# Patient Record
Sex: FEMALE | Race: BLACK OR AFRICAN AMERICAN | NOT HISPANIC OR LATINO | Employment: OTHER | URBAN - NONMETROPOLITAN AREA
[De-identification: names, ages, dates, MRNs, and addresses within clinical notes are randomized per-mention and may not be internally consistent; named-entity substitution may affect disease eponyms.]

---

## 2018-10-31 ENCOUNTER — OFFICE VISIT (OUTPATIENT)
Dept: FAMILY MEDICINE CLINIC | Facility: CLINIC | Age: 48
End: 2018-10-31

## 2018-10-31 VITALS
WEIGHT: 180.4 LBS | BODY MASS INDEX: 28.99 KG/M2 | HEIGHT: 66 IN | SYSTOLIC BLOOD PRESSURE: 148 MMHG | DIASTOLIC BLOOD PRESSURE: 96 MMHG

## 2018-10-31 DIAGNOSIS — Z87.11 HISTORY OF PEPTIC ULCER: ICD-10-CM

## 2018-10-31 DIAGNOSIS — Z12.11 SCREEN FOR COLON CANCER: ICD-10-CM

## 2018-10-31 DIAGNOSIS — Z12.31 VISIT FOR SCREENING MAMMOGRAM: ICD-10-CM

## 2018-10-31 DIAGNOSIS — Z11.4 SCREENING FOR HIV (HUMAN IMMUNODEFICIENCY VIRUS): ICD-10-CM

## 2018-10-31 DIAGNOSIS — Z78.9 ALCOHOL USE: ICD-10-CM

## 2018-10-31 DIAGNOSIS — Z72.0 TOBACCO USE: ICD-10-CM

## 2018-10-31 DIAGNOSIS — I10 BENIGN HYPERTENSION: Primary | ICD-10-CM

## 2018-10-31 DIAGNOSIS — Z11.59 ENCOUNTER FOR HEPATITIS C SCREENING TEST FOR LOW RISK PATIENT: ICD-10-CM

## 2018-10-31 DIAGNOSIS — F34.1 DYSTHYMIA: ICD-10-CM

## 2018-10-31 PROCEDURE — 99204 OFFICE O/P NEW MOD 45 MIN: CPT | Performed by: FAMILY MEDICINE

## 2018-10-31 RX ORDER — AMLODIPINE BESYLATE 5 MG/1
5 TABLET ORAL DAILY
Qty: 30 TABLET | Refills: 1 | Status: SHIPPED | OUTPATIENT
Start: 2018-10-31 | End: 2018-12-17 | Stop reason: SDUPTHER

## 2018-10-31 NOTE — PROGRESS NOTES
Subjective   Kesha Arevalo is a 48 y.o. female.     History of Present Illness   requesting evaluation hypertension.  Patient very poor historian.  States was taking blood pressure medicine) out about a year ago.  Recently moved to town.  States past history significant for sounds like psychiatric disease been on Zoloft and Abilify in the past.  Was on blood pressure medicine unknown type I time.  States only surgical history .  Does smoke states drinks moderately to a mild excess and time to time.  Playing history of bleeding peptic ulcers in the past.  Is behind on all screenings.    The following portions of the patient's history were reviewed and updated as appropriate: allergies, current medications, past family history, past medical history, past social history, past surgical history and problem list.    Review of Systems   Constitutional: Negative for activity change, appetite change, fatigue and unexpected weight change.   HENT: Negative for trouble swallowing and voice change.    Eyes: Negative for redness and visual disturbance.   Respiratory: Negative for cough and wheezing.    Cardiovascular: Negative for chest pain and palpitations.   Gastrointestinal: Positive for abdominal pain (Vague not reproducible). Negative for constipation, diarrhea, nausea and vomiting.   Genitourinary: Negative for urgency.   Musculoskeletal: Negative for joint swelling.   Neurological: Negative for syncope and headaches.   Hematological: Negative for adenopathy.   Psychiatric/Behavioral: Negative for sleep disturbance.       Objective   Physical Exam   Constitutional: She is oriented to person, place, and time. She appears well-developed.   HENT:   Head: Normocephalic.   Right Ear: External ear normal.   Left Ear: External ear normal.   Nose: Nose normal.   Mouth/Throat: Oropharynx is clear and moist.   Smokers erythema   Eyes: Pupils are equal, round, and reactive to light.   Neck: Normal range of motion. No  thyromegaly present.   Cardiovascular: Normal rate, regular rhythm, normal heart sounds and intact distal pulses.  Exam reveals no gallop and no friction rub.    No murmur heard.  Pulmonary/Chest: Breath sounds normal.   Abdominal: Soft. She exhibits no distension and no mass. There is no tenderness.   Musculoskeletal: Normal range of motion.   Neurological: She is alert and oriented to person, place, and time. She has normal reflexes.   Skin: Skin is warm and dry.   Psychiatric: Her affect is labile. Her speech is delayed and tangential. She is slowed. Cognition and memory are impaired.       Assessment/Plan   Kesha was seen today for establish care.    Diagnoses and all orders for this visit:    Benign hypertension  -     CBC (No Diff)  -     Comprehensive Metabolic Panel  -     Lipid Panel With LDL / HDL Ratio  -     Magnesium  -     Cancel: Urine Drug Screen - Urine, Clean Catch  -     MicroAlbumin, Urine, Random - Urine, Clean Catch  -     TSH  -     T4, Free  -     amLODIPine (NORVASC) 5 MG tablet; Take 1 tablet by mouth Daily. For bp till seen again    Tobacco use  -     Lipid Panel With LDL / HDL Ratio    Alcohol use  -     CBC (No Diff)  -     Lipid Panel With LDL / HDL Ratio  -     Cancel: Urine Drug Screen - Urine, Clean Catch  -     HIV-1 & HIV-2 Antibodies    Dysthymia  -     TSH  -     T4, Free    History of peptic ulcer    Encounter for hepatitis C screening test for low risk patient  -     Hepatitis C Antibody  -     Urine Drug Screen - Urine, Clean Catch    Visit for screening mammogram  -     Mammo Screening Digital Tomosynthesis Bilateral With CAD    Screen for colon cancer  -     Ambulatory Referral For Screening Colonoscopy    Screening for HIV (human immunodeficiency virus)  -     HIV-1 & HIV-2 Antibodies       on stopping smoking.  3-10m      on decreasing etoh for health reasons   lifestyle measures for hypertension control.  Start on low-dose amlodipine for now  lab work is ordered screens ordered declines vaccines recheck in 2 weeks on blood pressure also be time to a Pap smear.

## 2018-11-06 ENCOUNTER — PREP FOR SURGERY (OUTPATIENT)
Dept: OTHER | Facility: HOSPITAL | Age: 48
End: 2018-11-06

## 2018-11-06 DIAGNOSIS — Z12.11 SCREEN FOR COLON CANCER: Primary | ICD-10-CM

## 2018-11-06 RX ORDER — DEXTROSE AND SODIUM CHLORIDE 5; .45 G/100ML; G/100ML
100 INJECTION, SOLUTION INTRAVENOUS CONTINUOUS
Status: CANCELLED | OUTPATIENT
Start: 2018-11-12

## 2018-11-07 ENCOUNTER — HOSPITAL ENCOUNTER (OUTPATIENT)
Facility: HOSPITAL | Age: 48
Setting detail: HOSPITAL OUTPATIENT SURGERY
End: 2018-11-07
Attending: SURGERY | Admitting: SURGERY

## 2018-11-07 PROBLEM — Z12.11 SCREEN FOR COLON CANCER: Status: ACTIVE | Noted: 2018-11-07

## 2018-11-13 ENCOUNTER — TELEPHONE (OUTPATIENT)
Dept: FAMILY MEDICINE CLINIC | Facility: CLINIC | Age: 48
End: 2018-11-13

## 2018-12-17 DIAGNOSIS — I10 BENIGN HYPERTENSION: ICD-10-CM

## 2018-12-17 RX ORDER — AMLODIPINE BESYLATE 5 MG/1
TABLET ORAL
Qty: 90 TABLET | Refills: 0 | Status: SHIPPED | OUTPATIENT
Start: 2018-12-17 | End: 2019-01-15

## 2019-01-10 ENCOUNTER — TELEPHONE (OUTPATIENT)
Dept: FAMILY MEDICINE CLINIC | Facility: CLINIC | Age: 49
End: 2019-01-10

## 2019-01-10 NOTE — TELEPHONE ENCOUNTER
CASSIE PINO IS NEEDING A NEW PRESP TO BE SENT TO [facility] FOR THE AMLODIPINE  HER# [phone]

## 2019-01-11 ENCOUNTER — APPOINTMENT (OUTPATIENT)
Dept: LAB | Facility: HOSPITAL | Age: 49
End: 2019-01-11

## 2019-01-11 LAB
ALBUMIN SERPL-MCNC: 4.3 G/DL (ref 3.4–4.8)
ALBUMIN UR-MCNC: >114 MG/L
ALBUMIN/GLOB SERPL: 1.2 G/DL (ref 1.1–1.8)
ALP SERPL-CCNC: 60 U/L (ref 38–126)
ALT SERPL W P-5'-P-CCNC: 19 U/L (ref 9–52)
AMPHET+METHAMPHET UR QL: NEGATIVE
ANION GAP SERPL CALCULATED.3IONS-SCNC: 8 MMOL/L (ref 5–15)
AST SERPL-CCNC: 33 U/L (ref 14–36)
BARBITURATES UR QL SCN: NEGATIVE
BENZODIAZ UR QL SCN: NEGATIVE
BILIRUB SERPL-MCNC: 0.3 MG/DL (ref 0.2–1.3)
BUN BLD-MCNC: 14 MG/DL (ref 7–21)
BUN/CREAT SERPL: 14.9 (ref 7–25)
CALCIUM SPEC-SCNC: 9.5 MG/DL (ref 8.4–10.2)
CANNABINOIDS SERPL QL: NEGATIVE
CHLORIDE SERPL-SCNC: 104 MMOL/L (ref 95–110)
CHOLEST SERPL-MCNC: 295 MG/DL (ref 0–199)
CO2 SERPL-SCNC: 26 MMOL/L (ref 22–31)
COCAINE UR QL: POSITIVE
CREAT BLD-MCNC: 0.94 MG/DL (ref 0.5–1)
DEPRECATED RDW RBC AUTO: 46.7 FL (ref 36.4–46.3)
ERYTHROCYTE [DISTWIDTH] IN BLOOD BY AUTOMATED COUNT: 15 % (ref 11.5–14.5)
GFR SERPL CREATININE-BSD FRML MDRD: 76 ML/MIN/1.73 (ref 51–120)
GLOBULIN UR ELPH-MCNC: 3.5 GM/DL (ref 2.3–3.5)
GLUCOSE BLD-MCNC: 106 MG/DL (ref 60–100)
HCT VFR BLD AUTO: 40.8 % (ref 35–45)
HCV AB SER DONR QL: NEGATIVE
HDLC SERPL-MCNC: 41 MG/DL (ref 60–200)
HGB BLD-MCNC: 14.1 G/DL (ref 12–15.5)
HIV1+2 AB SER QL: NEGATIVE
LDLC SERPL CALC-MCNC: 207 MG/DL (ref 0–129)
LDLC/HDLC SERPL: 5.05 {RATIO} (ref 0–3.22)
MAGNESIUM SERPL-MCNC: 2.3 MG/DL (ref 1.6–2.3)
MCH RBC QN AUTO: 29.4 PG (ref 26.5–34)
MCHC RBC AUTO-ENTMCNC: 34.6 G/DL (ref 31.4–36)
MCV RBC AUTO: 85.2 FL (ref 80–98)
METHADONE UR QL SCN: NEGATIVE
OPIATES UR QL: NEGATIVE
OXYCODONE UR QL SCN: NEGATIVE
PLATELET # BLD AUTO: 254 10*3/MM3 (ref 150–450)
PMV BLD AUTO: 10.1 FL (ref 8–12)
POTASSIUM BLD-SCNC: 4 MMOL/L (ref 3.5–5.1)
PROT SERPL-MCNC: 7.8 G/DL (ref 6.3–8.6)
PROT UR-MCNC: 170.4 MG/DL
RBC # BLD AUTO: 4.79 10*6/MM3 (ref 3.77–5.16)
SODIUM BLD-SCNC: 138 MMOL/L (ref 137–145)
T4 FREE SERPL-MCNC: 0.88 NG/DL (ref 0.78–2.19)
TRIGL SERPL-MCNC: 235 MG/DL (ref 20–199)
TSH SERPL DL<=0.05 MIU/L-ACNC: 3.6 MIU/ML (ref 0.46–4.68)
VLDLC SERPL-MCNC: 47 MG/DL
WBC NRBC COR # BLD: 8.32 10*3/MM3 (ref 3.2–9.8)

## 2019-01-11 PROCEDURE — 86803 HEPATITIS C AB TEST: CPT | Performed by: FAMILY MEDICINE

## 2019-01-11 PROCEDURE — 80307 DRUG TEST PRSMV CHEM ANLYZR: CPT | Performed by: FAMILY MEDICINE

## 2019-01-11 PROCEDURE — 83735 ASSAY OF MAGNESIUM: CPT | Performed by: FAMILY MEDICINE

## 2019-01-11 PROCEDURE — 84443 ASSAY THYROID STIM HORMONE: CPT | Performed by: FAMILY MEDICINE

## 2019-01-11 PROCEDURE — 36415 COLL VENOUS BLD VENIPUNCTURE: CPT | Performed by: FAMILY MEDICINE

## 2019-01-11 PROCEDURE — 82043 UR ALBUMIN QUANTITATIVE: CPT | Performed by: FAMILY MEDICINE

## 2019-01-11 PROCEDURE — 80053 COMPREHEN METABOLIC PANEL: CPT | Performed by: FAMILY MEDICINE

## 2019-01-11 PROCEDURE — 84439 ASSAY OF FREE THYROXINE: CPT | Performed by: FAMILY MEDICINE

## 2019-01-11 PROCEDURE — 84156 ASSAY OF PROTEIN URINE: CPT | Performed by: FAMILY MEDICINE

## 2019-01-11 PROCEDURE — 80061 LIPID PANEL: CPT | Performed by: FAMILY MEDICINE

## 2019-01-11 PROCEDURE — G0432 EIA HIV-1/HIV-2 SCREEN: HCPCS | Performed by: FAMILY MEDICINE

## 2019-01-11 PROCEDURE — 85027 COMPLETE CBC AUTOMATED: CPT | Performed by: FAMILY MEDICINE

## 2019-01-15 ENCOUNTER — OFFICE VISIT (OUTPATIENT)
Dept: FAMILY MEDICINE CLINIC | Facility: CLINIC | Age: 49
End: 2019-01-15

## 2019-01-15 VITALS
BODY MASS INDEX: 30.39 KG/M2 | SYSTOLIC BLOOD PRESSURE: 130 MMHG | DIASTOLIC BLOOD PRESSURE: 80 MMHG | HEIGHT: 66 IN | WEIGHT: 189.1 LBS

## 2019-01-15 DIAGNOSIS — F14.90 COCAINE USE: ICD-10-CM

## 2019-01-15 DIAGNOSIS — Z30.09 FAMILY PLANNING: ICD-10-CM

## 2019-01-15 DIAGNOSIS — Z12.11 SCREEN FOR COLON CANCER: ICD-10-CM

## 2019-01-15 DIAGNOSIS — E78.00 HYPERCHOLESTEREMIA: ICD-10-CM

## 2019-01-15 DIAGNOSIS — I10 BENIGN HYPERTENSION: Primary | Chronic | ICD-10-CM

## 2019-01-15 PROBLEM — F34.1 DYSTHYMIA: Chronic | Status: ACTIVE | Noted: 2018-10-31

## 2019-01-15 PROBLEM — Z78.9 ALCOHOL USE: Chronic | Status: ACTIVE | Noted: 2018-10-31

## 2019-01-15 PROBLEM — Z87.11 HISTORY OF PEPTIC ULCER: Chronic | Status: ACTIVE | Noted: 2018-10-31

## 2019-01-15 PROBLEM — Z72.0 TOBACCO USE: Chronic | Status: ACTIVE | Noted: 2018-10-31

## 2019-01-15 PROCEDURE — 99406 BEHAV CHNG SMOKING 3-10 MIN: CPT | Performed by: FAMILY MEDICINE

## 2019-01-15 PROCEDURE — 99214 OFFICE O/P EST MOD 30 MIN: CPT | Performed by: FAMILY MEDICINE

## 2019-01-15 RX ORDER — AMLODIPINE BESYLATE 10 MG/1
10 TABLET ORAL DAILY
Qty: 90 TABLET | Refills: 1 | Status: SHIPPED | OUTPATIENT
Start: 2019-01-15 | End: 2019-09-18 | Stop reason: SDUPTHER

## 2019-01-15 RX ORDER — ATORVASTATIN CALCIUM 40 MG/1
40 TABLET, FILM COATED ORAL DAILY
Qty: 90 TABLET | Refills: 1 | Status: SHIPPED | OUTPATIENT
Start: 2019-01-15 | End: 2019-09-18 | Stop reason: SDUPTHER

## 2019-01-15 NOTE — PROGRESS NOTES
"Mirna Arevalo is a 48 y.o. female.     History of Present Illness   follow-up prolonged absence.  Was not able or didn't go to lab for this week after about 4 months.  Lab work revealed very high cholesterol as expected per patient.  Also drug screen was positive for cocaine.  Patient denies cocaine use states it may been in her drink \"\" she also states she continues to drink alcohol and smoke.  We've  extensively today mainly on self-destructive lifestyle behaviors and the need to stop smoking drinking and drug use.  Counseled on different modalities and agencies for same.  Also did not get colonoscopy nor mammogram will need to reorder both and refer to OB/GYN.    The following portions of the patient's history were reviewed and updated as appropriate: allergies, current medications, past family history, past medical history, past social history, past surgical history and problem list.    Review of Systems   Constitutional: Negative for activity change, appetite change, fatigue and unexpected weight change.   HENT: Negative for trouble swallowing and voice change.    Eyes: Negative for redness and visual disturbance.   Respiratory: Negative for cough and wheezing.    Cardiovascular: Negative for chest pain and palpitations.   Gastrointestinal: Negative for abdominal pain, constipation, diarrhea, nausea and vomiting.   Genitourinary: Negative for urgency.   Musculoskeletal: Negative for joint swelling.   Neurological: Negative for syncope and headaches.   Hematological: Negative for adenopathy.   Psychiatric/Behavioral: Negative for sleep disturbance.       Objective   Physical Exam   Constitutional: She appears well-developed.   HENT:   Head: Normocephalic.   Eyes: Pupils are equal, round, and reactive to light.   Neck: Normal range of motion.   Cardiovascular: Normal rate.   Pulmonary/Chest: Effort normal.   Abdominal: Soft.   Psychiatric: Her speech is normal and behavior is normal. Her mood " appears anxious.     Results for orders placed or performed in visit on 10/31/18   CBC (No Diff)   Result Value Ref Range    WBC 8.32 3.20 - 9.80 10*3/mm3    RBC 4.79 3.77 - 5.16 10*6/mm3    Hemoglobin 14.1 12.0 - 15.5 g/dL    Hematocrit 40.8 35.0 - 45.0 %    MCV 85.2 80.0 - 98.0 fL    MCH 29.4 26.5 - 34.0 pg    MCHC 34.6 31.4 - 36.0 g/dL    RDW 15.0 (H) 11.5 - 14.5 %    RDW-SD 46.7 (H) 36.4 - 46.3 fl    MPV 10.1 8.0 - 12.0 fL    Platelets 254 150 - 450 10*3/mm3   Comprehensive Metabolic Panel   Result Value Ref Range    Glucose 106 (H) 60 - 100 mg/dL    BUN 14 7 - 21 mg/dL    Creatinine 0.94 0.50 - 1.00 mg/dL    Sodium 138 137 - 145 mmol/L    Potassium 4.0 3.5 - 5.1 mmol/L    Chloride 104 95 - 110 mmol/L    CO2 26.0 22.0 - 31.0 mmol/L    Calcium 9.5 8.4 - 10.2 mg/dL    Total Protein 7.8 6.3 - 8.6 g/dL    Albumin 4.30 3.40 - 4.80 g/dL    ALT (SGPT) 19 9 - 52 U/L    AST (SGOT) 33 14 - 36 U/L    Alkaline Phosphatase 60 38 - 126 U/L    Total Bilirubin 0.3 0.2 - 1.3 mg/dL    eGFR   Amer 76 51 - 120 mL/min/1.73    Globulin 3.5 2.3 - 3.5 gm/dL    A/G Ratio 1.2 1.1 - 1.8 g/dL    BUN/Creatinine Ratio 14.9 7.0 - 25.0    Anion Gap 8.0 5.0 - 15.0 mmol/L   Hepatitis C Antibody   Result Value Ref Range    Hepatitis C Ab Negative Negative   Lipid Panel With LDL / HDL Ratio   Result Value Ref Range    Total Cholesterol 295 (H) 0 - 199 mg/dL    Triglycerides 235 (H) 20 - 199 mg/dL    HDL Cholesterol 41 (L) 60 - 200 mg/dL    LDL Cholesterol  207 (H) 0 - 129 mg/dL    VLDL Cholesterol 47 mg/dL    LDL/HDL Ratio 5.05 (H) 0.00 - 3.22   Magnesium   Result Value Ref Range    Magnesium 2.3 1.6 - 2.3 mg/dL   MicroAlbumin, Urine, Random - Urine, Clean Catch   Result Value Ref Range    Microalbumin, Urine >114.0 mg/L   TSH   Result Value Ref Range    TSH 3.600 0.460 - 4.680 mIU/mL   T4, Free   Result Value Ref Range    Free T4 0.88 0.78 - 2.19 ng/dL   Urine Drug Screen - Urine, Clean Catch   Result Value Ref Range    Amphetamine Screen,  Urine Negative Negative    Barbiturates Screen, Urine Negative Negative    Benzodiazepine Screen, Urine Negative Negative    Cocaine Screen, Urine Positive (A) Negative    Methadone Screen, Urine Negative Negative    Opiate Screen Negative Negative    Oxycodone Screen, Urine Negative Negative    THC, Screen, Urine Negative Negative   HIV-1 & HIV-2 Antibodies   Result Value Ref Range    HIV-1/ HIV-2 Negative Negative   Protein, Urine, Random - Urine, Clean Catch   Result Value Ref Range    Total Protein, Urine 170.4 mg/dL         Assessment/Plan   Kesha was seen today for follow-up.    Diagnoses and all orders for this visit:    Benign hypertension  -     amLODIPine (NORVASC) 10 MG tablet; Take 1 tablet by mouth Daily. For bp  New dosing    Hypercholesteremia  -     atorvastatin (LIPITOR) 40 MG tablet; Take 1 tablet by mouth Daily. For chol    Cocaine use    Family planning  -     Ambulatory Referral to Obstetrics / Gynecology    Screen for colon cancer  -     Ambulatory Referral For Screening Colonoscopy    Restart the Lipitor.  Increase amlodipine to 10 mg daily     on stopping smoking.  3-10m      on decreasing etoh for health reasons  Recheck 6 weeks

## 2019-01-22 ENCOUNTER — PREP FOR SURGERY (OUTPATIENT)
Dept: OTHER | Facility: HOSPITAL | Age: 49
End: 2019-01-22

## 2019-01-22 ENCOUNTER — HOSPITAL ENCOUNTER (OUTPATIENT)
Facility: HOSPITAL | Age: 49
Setting detail: HOSPITAL OUTPATIENT SURGERY
End: 2019-01-22
Attending: SURGERY | Admitting: SURGERY

## 2019-01-22 DIAGNOSIS — Z12.11 SCREEN FOR COLON CANCER: Primary | ICD-10-CM

## 2019-01-22 RX ORDER — DEXTROSE AND SODIUM CHLORIDE 5; .45 G/100ML; G/100ML
100 INJECTION, SOLUTION INTRAVENOUS CONTINUOUS
Status: CANCELLED | OUTPATIENT
Start: 2019-02-08

## 2019-02-04 VITALS — BODY MASS INDEX: 31.82 KG/M2 | WEIGHT: 198 LBS | HEIGHT: 66 IN

## 2019-02-16 ENCOUNTER — APPOINTMENT (OUTPATIENT)
Dept: CT IMAGING | Age: 49
End: 2019-02-16
Payer: MEDICARE

## 2019-02-16 ENCOUNTER — HOSPITAL ENCOUNTER (EMERGENCY)
Age: 49
Discharge: HOME | End: 2019-02-16
Payer: MEDICARE

## 2019-02-16 VITALS
HEART RATE: 78 BPM | DIASTOLIC BLOOD PRESSURE: 93 MMHG | HEIGHT: 66 IN | OXYGEN SATURATION: 93 % | RESPIRATION RATE: 16 BRPM | TEMPERATURE: 98 F | WEIGHT: 200 LBS | SYSTOLIC BLOOD PRESSURE: 129 MMHG | BODY MASS INDEX: 32.14 KG/M2

## 2019-02-16 DIAGNOSIS — M54.12 CERVICAL RADICULOPATHY: Primary | ICD-10-CM

## 2019-02-16 PROCEDURE — 72125 CT NECK SPINE W/O DYE: CPT

## 2019-02-16 PROCEDURE — 99284 EMERGENCY DEPT VISIT MOD MDM: CPT | Performed by: NURSE PRACTITIONER

## 2019-02-16 PROCEDURE — 99284 EMERGENCY DEPT VISIT MOD MDM: CPT

## 2019-02-16 PROCEDURE — 70450 CT HEAD/BRAIN W/O DYE: CPT

## 2019-02-16 RX ORDER — ATORVASTATIN CALCIUM 40 MG/1
40 TABLET, FILM COATED ORAL
COMMUNITY
Start: 2019-01-15

## 2019-02-16 RX ORDER — AMLODIPINE BESYLATE 10 MG/1
10 TABLET ORAL
COMMUNITY
Start: 2019-01-15

## 2019-02-16 RX ORDER — METHYLPREDNISOLONE 4 MG/1
TABLET ORAL
Qty: 1 KIT | Refills: 0 | Status: SHIPPED | OUTPATIENT
Start: 2019-02-16 | End: 2019-02-20

## 2019-02-18 ENCOUNTER — TELEPHONE (OUTPATIENT)
Dept: NEUROSURGERY | Age: 49
End: 2019-02-18

## 2019-02-20 ENCOUNTER — OFFICE VISIT (OUTPATIENT)
Dept: NEUROSURGERY | Age: 49
End: 2019-02-20
Payer: MEDICARE

## 2019-02-20 VITALS
WEIGHT: 195 LBS | BODY MASS INDEX: 31.34 KG/M2 | HEIGHT: 66 IN | HEART RATE: 94 BPM | DIASTOLIC BLOOD PRESSURE: 88 MMHG | SYSTOLIC BLOOD PRESSURE: 120 MMHG

## 2019-02-20 DIAGNOSIS — R20.0 RIGHT ARM NUMBNESS: ICD-10-CM

## 2019-02-20 DIAGNOSIS — M79.601 RIGHT ARM PAIN: ICD-10-CM

## 2019-02-20 DIAGNOSIS — M50.30 DDD (DEGENERATIVE DISC DISEASE), CERVICAL: ICD-10-CM

## 2019-02-20 DIAGNOSIS — M54.2 NECK PAIN: Primary | ICD-10-CM

## 2019-02-20 PROCEDURE — 3017F COLORECTAL CA SCREEN DOC REV: CPT | Performed by: NURSE PRACTITIONER

## 2019-02-20 PROCEDURE — 99204 OFFICE O/P NEW MOD 45 MIN: CPT | Performed by: NURSE PRACTITIONER

## 2019-02-20 PROCEDURE — G8427 DOCREV CUR MEDS BY ELIG CLIN: HCPCS | Performed by: NURSE PRACTITIONER

## 2019-02-20 PROCEDURE — G8599 NO ASA/ANTIPLAT THER USE RNG: HCPCS | Performed by: NURSE PRACTITIONER

## 2019-02-20 PROCEDURE — G8484 FLU IMMUNIZE NO ADMIN: HCPCS | Performed by: NURSE PRACTITIONER

## 2019-02-20 PROCEDURE — G8417 CALC BMI ABV UP PARAM F/U: HCPCS | Performed by: NURSE PRACTITIONER

## 2019-02-20 PROCEDURE — 4004F PT TOBACCO SCREEN RCVD TLK: CPT | Performed by: NURSE PRACTITIONER

## 2019-02-20 RX ORDER — AMLODIPINE BESYLATE 5 MG/1
1 TABLET ORAL DAILY
Refills: 0 | COMMUNITY
Start: 2018-12-14 | End: 2019-02-20

## 2019-02-20 ASSESSMENT — ENCOUNTER SYMPTOMS
SPUTUM PRODUCTION: 0
VOMITING: 1
WHEEZING: 1
SINUS PAIN: 0
HEMOPTYSIS: 0
STRIDOR: 0
SORE THROAT: 1
BACK PAIN: 0
ABDOMINAL PAIN: 0
CONSTIPATION: 0
SHORTNESS OF BREATH: 1
HEARTBURN: 0
DIARRHEA: 0
EYES NEGATIVE: 1
BLOOD IN STOOL: 0
NAUSEA: 0
COUGH: 0
ORTHOPNEA: 0

## 2019-02-21 ENCOUNTER — TELEPHONE (OUTPATIENT)
Dept: NEUROSURGERY | Age: 49
End: 2019-02-21

## 2019-03-11 ENCOUNTER — HOSPITAL ENCOUNTER (OUTPATIENT)
Dept: MRI IMAGING | Age: 49
Discharge: HOME | End: 2019-03-11
Payer: MEDICARE

## 2019-03-11 DIAGNOSIS — M54.2 NECK PAIN: ICD-10-CM

## 2019-03-11 DIAGNOSIS — M79.601 RIGHT ARM PAIN: ICD-10-CM

## 2019-03-11 DIAGNOSIS — M50.30 DDD (DEGENERATIVE DISC DISEASE), CERVICAL: ICD-10-CM

## 2019-03-11 DIAGNOSIS — R20.0 RIGHT ARM NUMBNESS: ICD-10-CM

## 2019-03-11 PROCEDURE — 72141 MRI NECK SPINE W/O DYE: CPT

## 2019-04-01 ENCOUNTER — OFFICE VISIT (OUTPATIENT)
Dept: NEUROSURGERY | Age: 49
End: 2019-04-01
Payer: MEDICARE

## 2019-04-01 VITALS
HEART RATE: 101 BPM | BODY MASS INDEX: 32.95 KG/M2 | DIASTOLIC BLOOD PRESSURE: 77 MMHG | HEIGHT: 66 IN | OXYGEN SATURATION: 100 % | WEIGHT: 205 LBS | SYSTOLIC BLOOD PRESSURE: 121 MMHG

## 2019-04-01 DIAGNOSIS — M54.2 NECK PAIN: ICD-10-CM

## 2019-04-01 DIAGNOSIS — M48.02 FORAMINAL STENOSIS OF CERVICAL REGION: ICD-10-CM

## 2019-04-01 DIAGNOSIS — M50.30 DDD (DEGENERATIVE DISC DISEASE), CERVICAL: ICD-10-CM

## 2019-04-01 DIAGNOSIS — R51.9 FREQUENT HEADACHES: ICD-10-CM

## 2019-04-01 DIAGNOSIS — M48.02 CERVICAL STENOSIS OF SPINAL CANAL: Primary | ICD-10-CM

## 2019-04-01 PROCEDURE — G8427 DOCREV CUR MEDS BY ELIG CLIN: HCPCS | Performed by: NURSE PRACTITIONER

## 2019-04-01 PROCEDURE — 4004F PT TOBACCO SCREEN RCVD TLK: CPT | Performed by: NURSE PRACTITIONER

## 2019-04-01 PROCEDURE — 3017F COLORECTAL CA SCREEN DOC REV: CPT | Performed by: NURSE PRACTITIONER

## 2019-04-01 PROCEDURE — 99214 OFFICE O/P EST MOD 30 MIN: CPT | Performed by: NURSE PRACTITIONER

## 2019-04-01 PROCEDURE — G8599 NO ASA/ANTIPLAT THER USE RNG: HCPCS | Performed by: NURSE PRACTITIONER

## 2019-04-01 PROCEDURE — G8417 CALC BMI ABV UP PARAM F/U: HCPCS | Performed by: NURSE PRACTITIONER

## 2019-04-01 RX ORDER — TIZANIDINE 4 MG/1
4 TABLET ORAL 3 TIMES DAILY PRN
Qty: 90 TABLET | Refills: 2 | Status: SHIPPED | OUTPATIENT
Start: 2019-04-01 | End: 2019-05-01
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
## 2019-06-09 ENCOUNTER — HOSPITAL ENCOUNTER (EMERGENCY)
Facility: HOSPITAL | Age: 49
Discharge: HOME | End: 2019-06-09
Attending: EMERGENCY MEDICINE | Admitting: EMERGENCY MEDICINE

## 2019-06-09 VITALS
OXYGEN SATURATION: 100 % | WEIGHT: 210.4 LBS | HEIGHT: 66 IN | SYSTOLIC BLOOD PRESSURE: 121 MMHG | TEMPERATURE: 98.6 F | BODY MASS INDEX: 33.82 KG/M2 | HEART RATE: 82 BPM | DIASTOLIC BLOOD PRESSURE: 72 MMHG | RESPIRATION RATE: 18 BRPM

## 2019-06-09 DIAGNOSIS — K08.139 LOSS OF TEETH DUE TO CAVITIES, UNSPECIFIED EDENTULISM CLASS: ICD-10-CM

## 2019-06-09 DIAGNOSIS — K02.9 DENTAL CARIES NOTED ON EXAMINATION: Primary | ICD-10-CM

## 2019-06-09 PROCEDURE — 99282 EMERGENCY DEPT VISIT SF MDM: CPT

## 2019-06-09 PROCEDURE — 99283 EMERGENCY DEPT VISIT LOW MDM: CPT

## 2019-06-09 RX ORDER — HYDROCODONE BITARTRATE AND ACETAMINOPHEN 5; 325 MG/1; MG/1
1 TABLET ORAL ONCE
Status: COMPLETED | OUTPATIENT
Start: 2019-06-09 | End: 2019-06-09

## 2019-06-09 RX ORDER — NABUMETONE 500 MG/1
500 TABLET, FILM COATED ORAL 2 TIMES DAILY
Qty: 14 TABLET | Refills: 0 | Status: SHIPPED | OUTPATIENT
Start: 2019-06-09 | End: 2019-06-16

## 2019-06-09 RX ORDER — AMOXICILLIN 500 MG/1
500 CAPSULE ORAL 3 TIMES DAILY
Qty: 30 CAPSULE | Refills: 0 | Status: SHIPPED | OUTPATIENT
Start: 2019-06-09 | End: 2019-06-19

## 2019-06-09 RX ADMIN — HYDROCODONE BITARTRATE AND ACETAMINOPHEN 1 TABLET: 5; 325 TABLET ORAL at 13:32

## 2019-06-09 NOTE — ED PROVIDER NOTES
Subjective   49-year-old female in the emergency department today complaining of right jaw pain.  Patient had some dental work done pretty recently.  Patient reports subjective fevers and chills at home.  Denies any type of trismus or trouble swallowing        History provided by:  Patient   used: No        Review of Systems   Constitutional: Positive for chills and fever (subjective ). Negative for fatigue.   HENT: Positive for dental problem. Negative for congestion.    Respiratory: Positive for shortness of breath.    Cardiovascular: Negative for chest pain and palpitations.   Gastrointestinal: Negative for abdominal pain, diarrhea, nausea and vomiting.   Genitourinary: Negative for flank pain.   Musculoskeletal: Negative for arthralgias.   Skin: Negative for rash.   Allergic/Immunologic: Negative for immunocompromised state.   Neurological: Negative for weakness.   Hematological: Negative for adenopathy.   Psychiatric/Behavioral: Negative for confusion.   All other systems reviewed and are negative.      No past medical history on file.    No Known Allergies    No past surgical history on file.    No family history on file.    Social History     Socioeconomic History   • Marital status: Legally      Spouse name: Not on file   • Number of children: Not on file   • Years of education: Not on file   • Highest education level: Not on file           Objective   Physical Exam   Constitutional: She is oriented to person, place, and time. Vital signs are normal. She appears well-developed and well-nourished.   HENT:   Head: Normocephalic.   Nose: Nose normal.   Mouth/Throat:       Eyes: Conjunctivae are normal. Pupils are equal, round, and reactive to light.   Neck: Normal range of motion.   Cardiovascular: Normal rate, regular rhythm and normal heart sounds.   Pulmonary/Chest: Effort normal and breath sounds normal.   Abdominal: Soft.   Musculoskeletal: Normal range of motion.    Neurological: She is alert and oriented to person, place, and time. GCS eye subscore is 4. GCS verbal subscore is 5. GCS motor subscore is 6.   Skin: Skin is warm and dry.   Psychiatric: She has a normal mood and affect.   Nursing note and vitals reviewed.      Procedures           ED Course      Treated with amoxil and nsaids. Follow up with dentist or pcp               MDM      Final diagnoses:   Dental caries noted on examination   Loss of teeth due to cavities, unspecified edentulism class            Jey Brewer, JUICE  06/09/19 [id number]

## 2019-09-18 ENCOUNTER — HOSPITAL ENCOUNTER (EMERGENCY)
Facility: HOSPITAL | Age: 49
Discharge: HOME | End: 2019-09-18
Attending: EMERGENCY MEDICINE | Admitting: EMERGENCY MEDICINE

## 2019-09-18 ENCOUNTER — APPOINTMENT (OUTPATIENT)
Dept: GENERAL RADIOLOGY | Facility: HOSPITAL | Age: 49
End: 2019-09-18

## 2019-09-18 VITALS
DIASTOLIC BLOOD PRESSURE: 76 MMHG | SYSTOLIC BLOOD PRESSURE: 133 MMHG | RESPIRATION RATE: 19 BRPM | BODY MASS INDEX: 37.61 KG/M2 | TEMPERATURE: 97.7 F | HEART RATE: 78 BPM | HEIGHT: 66 IN | WEIGHT: 234 LBS | OXYGEN SATURATION: 97 %

## 2019-09-18 DIAGNOSIS — E78.00 HYPERCHOLESTEREMIA: ICD-10-CM

## 2019-09-18 DIAGNOSIS — R07.9 CHEST PAIN, UNSPECIFIED TYPE: Primary | ICD-10-CM

## 2019-09-18 DIAGNOSIS — I10 BENIGN HYPERTENSION: Chronic | ICD-10-CM

## 2019-09-18 LAB
ALBUMIN SERPL-MCNC: 3.8 G/DL (ref 3.5–5.2)
ALBUMIN/GLOB SERPL: 1 G/DL
ALP SERPL-CCNC: 69 U/L (ref 39–117)
ALT SERPL W P-5'-P-CCNC: 35 U/L (ref 1–33)
ANION GAP SERPL CALCULATED.3IONS-SCNC: 12 MMOL/L (ref 5–15)
AST SERPL-CCNC: 26 U/L (ref 1–32)
BASOPHILS # BLD AUTO: 0.05 10*3/MM3 (ref 0–0.2)
BASOPHILS NFR BLD AUTO: 0.4 % (ref 0–1.5)
BILIRUB SERPL-MCNC: 0.2 MG/DL (ref 0.2–1.2)
BUN BLD-MCNC: 19 MG/DL (ref 6–20)
BUN/CREAT SERPL: 19.8 (ref 7–25)
CALCIUM SPEC-SCNC: 9.7 MG/DL (ref 8.6–10.5)
CHLORIDE SERPL-SCNC: 103 MMOL/L (ref 98–107)
CO2 SERPL-SCNC: 25 MMOL/L (ref 22–29)
CREAT BLD-MCNC: 0.96 MG/DL (ref 0.57–1)
DEPRECATED RDW RBC AUTO: 48.3 FL (ref 37–54)
EOSINOPHIL # BLD AUTO: 0.25 10*3/MM3 (ref 0–0.4)
EOSINOPHIL NFR BLD AUTO: 2.1 % (ref 0.3–6.2)
ERYTHROCYTE [DISTWIDTH] IN BLOOD BY AUTOMATED COUNT: 15.9 % (ref 12.3–15.4)
GFR SERPL CREATININE-BSD FRML MDRD: 74 ML/MIN/1.73
GLOBULIN UR ELPH-MCNC: 3.9 GM/DL
GLUCOSE BLD-MCNC: 109 MG/DL (ref 65–99)
HCG SERPL QL: NEGATIVE
HCT VFR BLD AUTO: 39.6 % (ref 34–46.6)
HGB BLD-MCNC: 13.5 G/DL (ref 12–15.9)
HOLD SPECIMEN: NORMAL
IMM GRANULOCYTES # BLD AUTO: 0.04 10*3/MM3 (ref 0–0.05)
IMM GRANULOCYTES NFR BLD AUTO: 0.3 % (ref 0–0.5)
INR PPP: 0.9 (ref 0.8–1.2)
LYMPHOCYTES # BLD AUTO: 2.59 10*3/MM3 (ref 0.7–3.1)
LYMPHOCYTES NFR BLD AUTO: 21.6 % (ref 19.6–45.3)
MCH RBC QN AUTO: 28.8 PG (ref 26.6–33)
MCHC RBC AUTO-ENTMCNC: 34.1 G/DL (ref 31.5–35.7)
MCV RBC AUTO: 84.4 FL (ref 79–97)
MONOCYTES # BLD AUTO: 1.15 10*3/MM3 (ref 0.1–0.9)
MONOCYTES NFR BLD AUTO: 9.6 % (ref 5–12)
NEUTROPHILS # BLD AUTO: 7.89 10*3/MM3 (ref 1.7–7)
NEUTROPHILS NFR BLD AUTO: 66 % (ref 42.7–76)
NRBC BLD AUTO-RTO: 0 /100 WBC (ref 0–0.2)
NT-PROBNP SERPL-MCNC: 14.8 PG/ML (ref 5–900)
PLATELET # BLD AUTO: 254 10*3/MM3 (ref 140–450)
PMV BLD AUTO: 10.1 FL (ref 6–12)
POTASSIUM BLD-SCNC: 3.6 MMOL/L (ref 3.5–5.2)
PROT SERPL-MCNC: 7.7 G/DL (ref 6–8.5)
PROTHROMBIN TIME: 12 SECONDS (ref 11.1–15.3)
RBC # BLD AUTO: 4.69 10*6/MM3 (ref 3.77–5.28)
SODIUM BLD-SCNC: 140 MMOL/L (ref 136–145)
TROPONIN T SERPL-MCNC: <0.01 NG/ML (ref 0–0.03)
TROPONIN T SERPL-MCNC: <0.01 NG/ML (ref 0–0.03)
WBC NRBC COR # BLD: 11.97 10*3/MM3 (ref 3.4–10.8)
WHOLE BLOOD HOLD SPECIMEN: NORMAL
WHOLE BLOOD HOLD SPECIMEN: NORMAL

## 2019-09-18 PROCEDURE — 99285 EMERGENCY DEPT VISIT HI MDM: CPT

## 2019-09-18 PROCEDURE — 93010 ELECTROCARDIOGRAM REPORT: CPT | Performed by: INTERNAL MEDICINE

## 2019-09-18 PROCEDURE — 85610 PROTHROMBIN TIME: CPT | Performed by: EMERGENCY MEDICINE

## 2019-09-18 PROCEDURE — 93005 ELECTROCARDIOGRAM TRACING: CPT | Performed by: EMERGENCY MEDICINE

## 2019-09-18 PROCEDURE — 85025 COMPLETE CBC W/AUTO DIFF WBC: CPT | Performed by: EMERGENCY MEDICINE

## 2019-09-18 PROCEDURE — 83880 ASSAY OF NATRIURETIC PEPTIDE: CPT | Performed by: EMERGENCY MEDICINE

## 2019-09-18 PROCEDURE — 80053 COMPREHEN METABOLIC PANEL: CPT | Performed by: EMERGENCY MEDICINE

## 2019-09-18 PROCEDURE — 84484 ASSAY OF TROPONIN QUANT: CPT | Performed by: EMERGENCY MEDICINE

## 2019-09-18 PROCEDURE — 93005 ELECTROCARDIOGRAM TRACING: CPT

## 2019-09-18 PROCEDURE — 71045 X-RAY EXAM CHEST 1 VIEW: CPT

## 2019-09-18 PROCEDURE — 84703 CHORIONIC GONADOTROPIN ASSAY: CPT | Performed by: EMERGENCY MEDICINE

## 2019-09-18 RX ORDER — ASPIRIN 81 MG/1
324 TABLET, CHEWABLE ORAL ONCE
Status: COMPLETED | OUTPATIENT
Start: 2019-09-18 | End: 2019-09-18

## 2019-09-18 RX ORDER — AMLODIPINE BESYLATE 10 MG/1
10 TABLET ORAL DAILY
Qty: 90 TABLET | Refills: 0 | Status: SHIPPED | OUTPATIENT
Start: 2019-09-18 | End: 2019-11-26 | Stop reason: SDUPTHER

## 2019-09-18 RX ORDER — ASPIRIN 81 MG/1
81 TABLET ORAL DAILY
Qty: 30 TABLET | Refills: 0 | Status: SHIPPED | OUTPATIENT
Start: 2019-09-18

## 2019-09-18 RX ORDER — ATORVASTATIN CALCIUM 40 MG/1
40 TABLET, FILM COATED ORAL DAILY
Qty: 90 TABLET | Refills: 0 | Status: SHIPPED | OUTPATIENT
Start: 2019-09-18 | End: 2019-11-26 | Stop reason: SDUPTHER

## 2019-09-18 RX ORDER — NAPROXEN 500 MG/1
500 TABLET ORAL 2 TIMES DAILY PRN
Qty: 20 TABLET | Refills: 0 | Status: SHIPPED | OUTPATIENT
Start: 2019-09-18 | End: 2019-10-21

## 2019-09-18 RX ORDER — SODIUM CHLORIDE 0.9 % (FLUSH) 0.9 %
10 SYRINGE (ML) INJECTION AS NEEDED
Status: DISCONTINUED | OUTPATIENT
Start: 2019-09-18 | End: 2019-09-18 | Stop reason: HOSPADM

## 2019-09-18 RX ADMIN — NITROGLYCERIN 1 INCH: 20 OINTMENT TOPICAL at 14:28

## 2019-09-18 RX ADMIN — ASPIRIN 81 MG 324 MG: 81 TABLET ORAL at 14:27

## 2019-09-18 NOTE — DISCHARGE INSTRUCTIONS
Minimize strenuous activities and smoking.  Start daily Asprin.  Return with any new or worsening symptoms, or any concerns.

## 2019-09-18 NOTE — ED PROVIDER NOTES
Subjective   49-year-old female presents with 1 day of chest discomfort.  Patient notes this mostly on her left side.  Patient notes that she feels like this is worse with smoking.  Patient states she is been smoking a lot more she is been having some stressors in her life.  Patient also states she has not been taking her blood pressure pills or the cholesterol pill secondary to running out, this is been approximately 4 days.  Patient feels like this may be adding to her symptoms as well.  Patient has no known atherosclerotic disease has not been worked up though in the past.  Denies any fevers or chills or systemic symptoms.  Has had no nausea vomiting.  No dizziness syncope or near syncope.            Review of Systems   Constitutional: Negative.  Negative for appetite change, chills and fever.   HENT: Negative.  Negative for congestion.    Eyes: Negative.  Negative for photophobia and visual disturbance.   Respiratory: Negative.  Negative for cough, chest tightness and shortness of breath.    Cardiovascular: Positive for chest pain. Negative for palpitations.   Gastrointestinal: Negative.  Negative for abdominal pain, constipation, diarrhea, nausea and vomiting.   Endocrine: Negative.    Genitourinary: Negative.  Negative for decreased urine volume, dysuria, flank pain and hematuria.   Musculoskeletal: Negative.  Negative for arthralgias, back pain, myalgias, neck pain and neck stiffness.   Skin: Negative.  Negative for pallor.   Neurological: Negative.  Negative for dizziness, syncope, weakness, light-headedness, numbness and headaches.   Psychiatric/Behavioral: Negative for confusion and suicidal ideas. The patient is nervous/anxious.    All other systems reviewed and are negative.      Past Medical History:   Diagnosis Date   • Elevated cholesterol    • Hypertension        Allergies   Allergen Reactions   • Penicillins Rash       Past Surgical History:   Procedure Laterality Date   •  SECTION    "      Family History   Problem Relation Age of Onset   • Diabetes Other        Social History     Socioeconomic History   • Marital status: Legally      Spouse name: Not on file   • Number of children: Not on file   • Years of education: Not on file   • Highest education level: Not on file   Tobacco Use   • Smoking status: Current Some Day Smoker     Packs/day: 1.00   • Smokeless tobacco: Never Used   Substance and Sexual Activity   • Alcohol use: Yes     Alcohol/week: 7.8 oz     Types: 2 Glasses of wine, 7 Cans of beer, 4 Shots of liquor per week     Comment: once starts cant stop   • Drug use: Yes     Types: Marijuana, \"Crack\" cocaine   • Sexual activity: Defer           Objective   Physical Exam   Constitutional: She is oriented to person, place, and time. She appears well-developed and well-nourished.  Non-toxic appearance. She does not appear ill. No distress.   HENT:   Head: Normocephalic and atraumatic.   Nose: Nose normal.   Mouth/Throat: Oropharynx is clear and moist.   Eyes: Conjunctivae and EOM are normal. No scleral icterus.   Neck: Normal range of motion. Neck supple. No JVD present.   Cardiovascular: Normal rate, regular rhythm, normal heart sounds and intact distal pulses. Exam reveals no gallop and no friction rub.   No murmur heard.  Pulmonary/Chest: Effort normal. No respiratory distress. She has no wheezes. She has no rhonchi. She has no rales. She exhibits no tenderness.   Abdominal: Soft. She exhibits no distension and no mass. There is no tenderness. There is no rebound and no guarding.   Musculoskeletal: Normal range of motion. She exhibits no edema, tenderness or deformity.   Lymphadenopathy:     She has no cervical adenopathy.   Neurological: She is alert and oriented to person, place, and time. No cranial nerve deficit. She exhibits normal muscle tone.   Skin: Skin is warm and dry. Capillary refill takes less than 2 seconds. No rash noted. She is not diaphoretic. No erythema. No " pallor.   Psychiatric: She has a normal mood and affect. Her behavior is normal. Judgment and thought content normal.   Nursing note and vitals reviewed.      ECG 12 Lead    Date/Time: 9/18/2019 1:53 PM  Performed by: Alexis Interiano MD  Authorized by: Alexis Interiano MD   Interpreted by physician  Rhythm: sinus rhythm  Other findings: CHIRAG and prolonged QTc interval  Comments: Nonspecific T wave flattening laterally.                 ED Course      Labs Reviewed   COMPREHENSIVE METABOLIC PANEL - Abnormal; Notable for the following components:       Result Value    Glucose 109 (*)     ALT (SGPT) 35 (*)     All other components within normal limits    Narrative:     GFR Normal >60  Chronic Kidney Disease <60  Kidney Failure <15   CBC WITH AUTO DIFFERENTIAL - Abnormal; Notable for the following components:    WBC 11.97 (*)     RDW 15.9 (*)     Neutrophils, Absolute 7.89 (*)     Monocytes, Absolute 1.15 (*)     All other components within normal limits   TROPONIN (IN-HOUSE) - Normal    Narrative:     Troponin T Reference Range:  <= 0.03 ng/mL-   Negative for AMI  >0.03 ng/mL-     Abnormal for myocardial necrosis.  Clinicians would have to utilize clinical acumen, EKG, Troponin and serial changes to determine if it is an Acute Myocardial Infarction or myocardial injury due to an underlying chronic condition.    TROPONIN (IN-HOUSE) - Normal    Narrative:     Troponin T Reference Range:  <= 0.03 ng/mL-   Negative for AMI  >0.03 ng/mL-     Abnormal for myocardial necrosis.  Clinicians would have to utilize clinical acumen, EKG, Troponin and serial changes to determine if it is an Acute Myocardial Infarction or myocardial injury due to an underlying chronic condition.    BNP (IN-HOUSE) - Normal    Narrative:     Among patients with dyspnea, NT-proBNP is highly sensitive for the detection of acute congestive heart failure. In addition NT-proBNP of <300 pg/ml effectively rules out acute congestive heart failure with 99%  negative predictive value.   HCG, SERUM, QUALITATIVE - Normal   PROTIME-INR - Normal    Narrative:     Therapeutic range for most indications is 2.0-3.0 INR,  or 2.5-3.5 for mechanical heart valves.   RAINBOW DRAW    Narrative:     The following orders were created for panel order [facility] Draw.  Procedure                               Abnormality         Status                     ---------                               -----------         ------                     Light Blue Top[[id number]]                                   Final result               Green Top (Gel)[[id number]]                                  Final result               Lavender Top[[id number]]                                     Final result               Gold Top - SST[[id number]]                                                                Please view results for these tests on the individual orders.   CBC AND DIFFERENTIAL    Narrative:     The following orders were created for panel order CBC & Differential.  Procedure                               Abnormality         Status                     ---------                               -----------         ------                     CBC Auto Differential[[id number]]        Abnormal            Final result                 Please view results for these tests on the individual orders.   LIGHT BLUE TOP   GREEN TOP   LAVENDER TOP       XR Chest 1 View   Final Result   CONCLUSION:          1. No evidence of an active cardiopulmonary process.                                                                    Electronically signed by:  JOHNSON Dent MD  9/18/2019 1:37   PM CDT Workstation: [id number]          Heart score of 3.  Negative markers x2.  Discussed with patient that cannot rule out blockages and cardiac stress testing would be appropriate outpatient.  Patient will follow-up with Dr. Demarco or Dr. Henderson for stress testing.  Patient's medications refilled awaiting primary care visit.           [facility]    Final diagnoses:   Chest pain, unspecified type              Alexis Interiano MD  09/18/19 3975

## 2019-10-03 ENCOUNTER — TELEPHONE (OUTPATIENT)
Dept: NEUROSURGERY | Age: 49
End: 2019-10-03

## 2019-10-21 ENCOUNTER — APPOINTMENT (OUTPATIENT)
Dept: LAB | Facility: HOSPITAL | Age: 49
End: 2019-10-21

## 2019-10-21 ENCOUNTER — OFFICE VISIT (OUTPATIENT)
Dept: FAMILY MEDICINE CLINIC | Facility: CLINIC | Age: 49
End: 2019-10-21

## 2019-10-21 VITALS
WEIGHT: 210.3 LBS | HEIGHT: 66 IN | BODY MASS INDEX: 33.8 KG/M2 | SYSTOLIC BLOOD PRESSURE: 115 MMHG | DIASTOLIC BLOOD PRESSURE: 88 MMHG

## 2019-10-21 DIAGNOSIS — F34.1 DYSTHYMIA: Chronic | ICD-10-CM

## 2019-10-21 DIAGNOSIS — F14.90 COCAINE USE: Chronic | ICD-10-CM

## 2019-10-21 DIAGNOSIS — R19.07 ABDOMINAL SWELLING, GENERALIZED: Primary | ICD-10-CM

## 2019-10-21 DIAGNOSIS — E78.00 HYPERCHOLESTEREMIA: Chronic | ICD-10-CM

## 2019-10-21 DIAGNOSIS — Z78.9 ALCOHOL USE: Chronic | ICD-10-CM

## 2019-10-21 DIAGNOSIS — R10.9 ABDOMINAL DISCOMFORT: ICD-10-CM

## 2019-10-21 DIAGNOSIS — I10 BENIGN HYPERTENSION: Chronic | ICD-10-CM

## 2019-10-21 DIAGNOSIS — Z72.0 TOBACCO USE: Chronic | ICD-10-CM

## 2019-10-21 PROBLEM — Z91.199 GENERAL PATIENT NONCOMPLIANCE: Chronic | Status: ACTIVE | Noted: 2019-10-21

## 2019-10-21 PROBLEM — Z91.199 GENERAL PATIENT NONCOMPLIANCE: Status: ACTIVE | Noted: 2019-10-21

## 2019-10-21 PROBLEM — Z12.11 SCREEN FOR COLON CANCER: Status: RESOLVED | Noted: 2019-01-22 | Resolved: 2019-10-21

## 2019-10-21 LAB
ALBUMIN SERPL-MCNC: 4.3 G/DL (ref 3.5–5.2)
ALBUMIN/GLOB SERPL: 1.3 G/DL
ALP SERPL-CCNC: 70 U/L (ref 39–117)
ALT SERPL W P-5'-P-CCNC: 26 U/L (ref 1–33)
AMPHET+METHAMPHET UR QL: NEGATIVE
AMPHETAMINES UR QL: NEGATIVE
ANION GAP SERPL CALCULATED.3IONS-SCNC: 11.8 MMOL/L (ref 5–15)
AST SERPL-CCNC: 25 U/L (ref 1–32)
BARBITURATES UR QL SCN: NEGATIVE
BENZODIAZ UR QL SCN: NEGATIVE
BILIRUB SERPL-MCNC: 0.3 MG/DL (ref 0.2–1.2)
BUN BLD-MCNC: 15 MG/DL (ref 6–20)
BUN/CREAT SERPL: 17.6 (ref 7–25)
BUPRENORPHINE SERPL-MCNC: NEGATIVE NG/ML
CALCIUM SPEC-SCNC: 9.5 MG/DL (ref 8.6–10.5)
CANNABINOIDS SERPL QL: NEGATIVE
CHLORIDE SERPL-SCNC: 103 MMOL/L (ref 98–107)
CO2 SERPL-SCNC: 24.2 MMOL/L (ref 22–29)
COCAINE UR QL: NEGATIVE
CREAT BLD-MCNC: 0.85 MG/DL (ref 0.57–1)
DEPRECATED RDW RBC AUTO: 44.4 FL (ref 37–54)
ERYTHROCYTE [DISTWIDTH] IN BLOOD BY AUTOMATED COUNT: 14.5 % (ref 12.3–15.4)
GFR SERPL CREATININE-BSD FRML MDRD: 85 ML/MIN/1.73
GLOBULIN UR ELPH-MCNC: 3.3 GM/DL
GLUCOSE BLD-MCNC: 132 MG/DL (ref 65–99)
HAV IGM SERPL QL IA: NORMAL
HBV CORE IGM SERPL QL IA: NORMAL
HBV SURFACE AG SERPL QL IA: NORMAL
HCT VFR BLD AUTO: 38.1 % (ref 34–46.6)
HCV AB SER DONR QL: NORMAL
HGB BLD-MCNC: 12.7 G/DL (ref 12–15.9)
MAGNESIUM SERPL-MCNC: 2.2 MG/DL (ref 1.6–2.6)
MCH RBC QN AUTO: 28.4 PG (ref 26.6–33)
MCHC RBC AUTO-ENTMCNC: 33.3 G/DL (ref 31.5–35.7)
MCV RBC AUTO: 85.2 FL (ref 79–97)
METHADONE UR QL SCN: NEGATIVE
OPIATES UR QL: NEGATIVE
OXYCODONE UR QL SCN: NEGATIVE
PCP UR QL SCN: NEGATIVE
PLATELET # BLD AUTO: 242 10*3/MM3 (ref 140–450)
PMV BLD AUTO: 11 FL (ref 6–12)
POTASSIUM BLD-SCNC: 3.9 MMOL/L (ref 3.5–5.2)
PROPOXYPH UR QL: NEGATIVE
PROT SERPL-MCNC: 7.6 G/DL (ref 6–8.5)
RBC # BLD AUTO: 4.47 10*6/MM3 (ref 3.77–5.28)
SODIUM BLD-SCNC: 139 MMOL/L (ref 136–145)
TRICYCLICS UR QL SCN: NEGATIVE
WBC NRBC COR # BLD: 8.47 10*3/MM3 (ref 3.4–10.8)

## 2019-10-21 PROCEDURE — 85027 COMPLETE CBC AUTOMATED: CPT | Performed by: FAMILY MEDICINE

## 2019-10-21 PROCEDURE — 36415 COLL VENOUS BLD VENIPUNCTURE: CPT | Performed by: FAMILY MEDICINE

## 2019-10-21 PROCEDURE — 80307 DRUG TEST PRSMV CHEM ANLYZR: CPT | Performed by: FAMILY MEDICINE

## 2019-10-21 PROCEDURE — 80074 ACUTE HEPATITIS PANEL: CPT | Performed by: FAMILY MEDICINE

## 2019-10-21 PROCEDURE — 80053 COMPREHEN METABOLIC PANEL: CPT | Performed by: FAMILY MEDICINE

## 2019-10-21 PROCEDURE — 83735 ASSAY OF MAGNESIUM: CPT | Performed by: FAMILY MEDICINE

## 2019-10-21 PROCEDURE — 99214 OFFICE O/P EST MOD 30 MIN: CPT | Performed by: FAMILY MEDICINE

## 2019-10-21 NOTE — PROGRESS NOTES
Subjective   Kesha Arevalo is a 49 y.o. female.     History of Present Illness   presents today for prolonged absence.  Requesting evaluation of abdominal increase in size and tenderness over the past month.  States that had been drinking but quit also states had been using cocaine still but quit again.  Continues to smoke.  States taking blood pressure medicine since given in the ER again.  Again continued noncompliance and self-destructive behavior.  States only abdominal surgery in the past has been .  Did not get colonoscopy as ordered earlier in the year nor visits GYN or mammogram.  History noted.  HPI    The following portions of the patient's history were reviewed and updated as appropriate: allergies, current medications, past family history, past medical history, past social history, past surgical history and problem list.    Review of Systems  Review of Systems   Constitutional: Negative for activity change, appetite change, fatigue and unexpected weight change.   HENT: Negative for trouble swallowing and voice change.    Eyes: Negative for redness and visual disturbance.   Respiratory: Negative for cough and wheezing.    Cardiovascular: Negative for chest pain and palpitations.   Gastrointestinal: Positive for abdominal distention, abdominal pain and constipation (Occasional). Negative for diarrhea, nausea and vomiting.   Genitourinary: Negative for urgency.   Musculoskeletal: Negative for joint swelling.   Neurological: Negative for syncope and headaches.   Hematological: Negative for adenopathy.   Psychiatric/Behavioral: Negative for sleep disturbance.       Objective   Physical Exam  Physical Exam   Constitutional: She is oriented to person, place, and time. She appears well-developed.   HENT:   Head: Normocephalic.   Nose: Nose normal.   Eyes: Pupils are equal, round, and reactive to light.   Neck: Normal range of motion. No thyromegaly present.   Cardiovascular: Normal rate, regular rhythm,  "normal heart sounds and intact distal pulses. Exam reveals no gallop and no friction rub.   No murmur heard.  Pulmonary/Chest: Breath sounds normal.   Abdominal: Soft. She exhibits distension (Mild diffuse distention from upper rib cage down.  Minimally tender.  Questionable fluid wave versus not.  Midline scar noted.  Overall generalized mild distention.). She exhibits no mass. There is no tenderness.   Musculoskeletal: Normal range of motion.   Neurological: She is alert and oriented to person, place, and time. She has normal reflexes.   Skin: Skin is warm and dry.   Psychiatric: She has a normal mood and affect. Her speech is delayed and tangential. She is slowed.   Continued mild tangential behavior.         Visit Vitals  /88 (BP Location: Left arm, Patient Position: Sitting, Cuff Size: Large Adult)   Ht 167.6 cm (66\")   Wt 95.4 kg (210 lb 4.8 oz)   LMP 09/23/2019 (Approximate)   BMI 33.94 kg/m²     Body mass index is 33.94 kg/m².      Assessment/Plan   Kesha was seen today for stomach swollen .    Diagnoses and all orders for this visit:    Abdominal swelling, generalized  -     Comprehensive Metabolic Panel  -     Magnesium  -     CBC (No Diff)  -     Hepatitis Panel, Acute  -     CT Abdomen Pelvis Without Contrast; Future    Abdominal discomfort  -     Comprehensive Metabolic Panel  -     CBC (No Diff)  -     Hepatitis Panel, Acute  -     CT Abdomen Pelvis Without Contrast; Future    Benign hypertension  -     Comprehensive Metabolic Panel    Hypercholesteremia    Tobacco use    Dysthymia    Cocaine use    Alcohol use  -     CBC (No Diff)  -     Hepatitis Panel, Acute  -     Urine Drug Screen - Urine, Clean Catch    Multiple possibilities.  Acutely counseled again on lifestyle measures as before.  Lab work to look at liver kidney function.  CT scan of the abdomen within the next 48 hours.  We will follow-up after results declines flu vaccine  "

## 2019-10-22 ENCOUNTER — OFFICE VISIT (OUTPATIENT)
Dept: CARDIOLOGY | Facility: CLINIC | Age: 49
End: 2019-10-22

## 2019-10-22 VITALS
OXYGEN SATURATION: 99 % | SYSTOLIC BLOOD PRESSURE: 128 MMHG | HEART RATE: 80 BPM | HEIGHT: 66 IN | DIASTOLIC BLOOD PRESSURE: 78 MMHG | WEIGHT: 213 LBS | BODY MASS INDEX: 34.23 KG/M2

## 2019-10-22 DIAGNOSIS — R07.89 CHEST PRESSURE: Primary | ICD-10-CM

## 2019-10-22 PROCEDURE — 99406 BEHAV CHNG SMOKING 3-10 MIN: CPT | Performed by: INTERNAL MEDICINE

## 2019-10-22 PROCEDURE — 99205 OFFICE O/P NEW HI 60 MIN: CPT | Performed by: INTERNAL MEDICINE

## 2019-10-22 RX ORDER — NICOTINE 21 MG/24HR
1 PATCH, TRANSDERMAL 24 HOURS TRANSDERMAL EVERY 24 HOURS
Qty: 30 PATCH | Refills: 2 | Status: SHIPPED | OUTPATIENT
Start: 2019-10-22

## 2019-10-22 NOTE — PROGRESS NOTES
[facility] Cardiology  OFFICE NOTE    Cardiovascular Medicine  Michaela Salazar M.D., RPVI         No referring provider defined for this encounter.    Thank you for asking me to see Kesha Arevalo for chest pain.    History of Present Illness  This is a 49 y.o. female with:    1.  Hypertension  2.  Hyperlipidemia  3.  Active tobacco use  4.  Previous cocaine use  5.  History of peptic ulcer disease.    Kesha Arevalo is a 49 y.o. female who presents for consultation today.  Patient has presented to clinic for further evaluation for chest pain.  Patient reported that about several weeks ago, she had sudden onset chest pain, it was sharp in character, localized to the left side of her chest, lasted for 20 to 30 minutes that she presented to the ER.  In ER EKG was with nonspecific changes, she was ruled out for acute coronary syndrome, and subsequently discharged home for outpatient follow-up.  X-rays without any acute cardiopulmonary process.  Had one recurrence of chest pain since she has been at home.  Most of these episodes are occurring at rest.  However recently she has cut back on her physical activities.  She is denying any shortness of breath but is complaining of new onset lower extremity swelling, abdominal swelling.  Denying orthopnea PND.  She continues to smoke and is willing to quit once try nicotine patches.  She used to use cocaine before but has not used anything recently.  Family history premature coronary artery disease.  She has been well controlled on her amlodipine.    Review of Systems - ROS  Constitution: Negative for weakness, weight gain and weight loss.   HENT: Negative for congestion.    Eyes: Negative for blurred vision.   Cardiovascular: As mentioned above  Respiratory: Negative for cough and hemoptysis.    Endocrine: Negative for polydipsia and polyuria.   Hematologic/Lymphatic: Negative for bleeding problem. Does not bruise/bleed easily.   Skin: Negative for flushing.  "  Musculoskeletal: Negative for neck pain and stiffness.   Gastrointestinal: Negative for abdominal pain, diarrhea, jaundice, melena, nausea and vomiting.   Genitourinary: Negative for dysuria and hematuria.   Neurological: Negative for dizziness, focal weakness and numbness.   Psychiatric/Behavioral: Negative for altered mental status and depression.          All other systems were reviewed and were negative.    family history includes Diabetes in an other family member.     reports that she has been smoking.  She has been smoking about 1.00 pack per day. She has never used smokeless tobacco. She reports that she drinks about 7.8 oz of alcohol per week. She reports that she uses drugs. Drugs: Marijuana and \"Crack\" cocaine.    Allergies   Allergen Reactions   • Penicillins Rash         Current Outpatient Medications:   •  amLODIPine (NORVASC) 10 MG tablet, Take 1 tablet by mouth Daily. For bp  New dosing, Disp: 90 tablet, Rfl: 0  •  aspirin 81 MG EC tablet, Take 1 tablet by mouth Daily., Disp: 30 tablet, Rfl: 0  •  atorvastatin (LIPITOR) 40 MG tablet, Take 1 tablet by mouth Daily. For chol, Disp: 90 tablet, Rfl: 0  No current facility-administered medications for this visit.     Facility-Administered Medications Ordered in Other Visits:   •  lidocaine viscous (XYLOCAINE) 2 % mouth solution 5 mL, 5 mL, Mouth/Throat, Once, Jey Brewer, APRN    Physical Exam:  Vitals:    10/22/19 0856 10/22/19 0901   BP: 122/74 128/78   BP Location: Left arm Right arm   Patient Position: Sitting Sitting   Cuff Size: Adult Adult   Pulse: 80    SpO2: 99%    Weight: 96.6 kg (213 lb)    Height: 167.6 cm (65.98\")      Current Pain Level: none  Pulse Ox: Normal  on room air  General: alert, appears stated age and cooperative     Body Habitus: well-nourished    HEENT: Head: Normocephalic, no lesions, without obvious abnormality. No arcus senilis, xanthelasma or xanthomas.    Neuro: alert, oriented x3  Pulses: 2+ and symmetric  JVP: " Volume/Pulsation: Normal.  Normal waveforms.   Appropriate inspiratory decrease.  No Kussmaul's. No Efren's.   Carotid Exam: no bruit normal pulsation bilaterally   Carotid Volume: normal.     Respirations: no increased work of breathing   Chest:  Normal    Pulmonary:Normal   Precordium: Normal impulses. P2 is not palpable.  RV Heave: absent  LV Heave: absent  [place]:  normal size and placement  Palpable S4: absent.  Heart rate: normal    Heart Rhythm: regular     Heart Sounds: S1: normal  S2: normal  S3: absent   S4: absent  Opening Snap: absent    Pericardial Rub:  Absent: .    Abdomen:   Appearance: normal .  Palpation: Soft, non-tender to palpation, bowel sounds positive in all four quadrants; no guarding or rebound tenderness  Extremity: no edema.   LE Skin: no rashes  LE Hair:  normal  LE Pulses: well perfused with normal pulses in the distal extremities  Pallor on elevation: Absent. Rubor on dependency: None      DATA REVIEWED:     EKG. I personally reviewed and interpreted the EKG.  Normal sinus rhythm.    ECG/EMG Results (all)     None        ---------------------------------------------------  TTE/KEMAR:       --------------------------------------------------------------------------------------------------  LABS:     The CVD Risk score (CARI'Agostino, et al., 2008) failed to calculate for the following reasons:    The patient has a prior MI, stroke, CHF, or peripheral vascular disease diagnosis         Lab Results   Component Value Date    GLUCOSE 132 (H) 10/21/2019    BUN 15 10/21/2019    CREATININE 0.85 10/21/2019    EGFRIFAFRI 85 10/21/2019    BCR 17.6 10/21/2019    K 3.9 10/21/2019    CO2 24.2 10/21/2019    CALCIUM 9.5 10/21/2019    ALBUMIN 4.30 10/21/2019    AST 25 10/21/2019    ALT 26 10/21/2019     Lab Results   Component Value Date    WBC 8.47 10/21/2019    HGB 12.7 10/21/2019    HCT 38.1 10/21/2019    MCV 85.2 10/21/2019     10/21/2019     Lab Results   Component Value Date    CHOL 295 (H)  01/11/2019    TRIG 235 (H) 01/11/2019    HDL 41 (L) 01/11/2019     (H) 01/11/2019     Lab Results   Component Value Date    TSH 3.600 01/11/2019     Lab Results   Component Value Date    TROPONINT <0.010 09/18/2019     No results found for: HGBA1C  No results found for: DDIMER  Lab Results   Component Value Date    ALT 26 10/21/2019     No results found for: HGBA1C  Lab Results   Component Value Date    MICROALBUR >114.0 01/11/2019    CREATININE 0.85 10/21/2019     No results found for: IRON, TIBC, FERRITIN  Lab Results   Component Value Date    INR 0.90 09/18/2019    PROTIME 12.0 09/18/2019       Assessment/Plan     1.  Chest pain:  Chest pain has both typical and typical features.  She has risk factors of hypertension, hyperlipidemia, active tobacco use and prior cocaine use.  Baseline EKG with nonspecific ST-T changes.  We will plan performing a 2-day nuclear exercise stress test.  I explained the risks, benefits, alternatives and limitations of stress to the patient she was agreeable.  Continue aspirin/statin and amlodipine for now.  Advised her to seek medical attention should have significant chest pain not resolving.  Smoking cessation    2.  Hypertension:  Controlled on amlodipine    3.  Hyperlipidemia:  New 40 of atorvastatin consider increasing to 80 in the next visit.    4.  Lower extremity swelling;  New onset lower extremity and abdominal swelling.  Echocardiogram to assess for any congestive heart failure any valvular abnormalities.  Of note patient is already also on amlodipine.  And on the results of her echo might consider switching her from amlodipine to thiazide diuretic.      Prevention:  Patient's Body mass index is 34.4 kg/m². BMI is above normal parameters. Recommendations include: exercise counseling and nutrition counseling.      Kesha Arevalo is a current cigarettes user.  She currently smokes 1 pack of cigarettes per day for a duration of 14 years. I have educated her on the risk of  diseases from using tobacco products such as cancer, COPD and heart diease.     I advised her to quit and she is willing to quit. We have discussed the following method/s for tobacco cessation:  Nicotine patches..  Together we have set a quit date for 1 week from today.  She will follow up with me in 4 week or sooner to check on her progress.    I spent 5 minutes counseling the patient.          AAA Screening:     Return in about 3 months (around 1/22/2020).          This document has been electronically signed by Michaela Salazar MD on October 22, 2019 9:30 AM

## 2019-10-24 ENCOUNTER — HOSPITAL ENCOUNTER (OUTPATIENT)
Dept: CT IMAGING | Facility: HOSPITAL | Age: 49
Discharge: HOME | End: 2019-10-24
Admitting: FAMILY MEDICINE

## 2019-10-24 DIAGNOSIS — R10.9 ABDOMINAL DISCOMFORT: ICD-10-CM

## 2019-10-24 DIAGNOSIS — R10.84 GENERALIZED ABDOMINAL PAIN: ICD-10-CM

## 2019-10-24 DIAGNOSIS — R19.07 ABDOMINAL SWELLING, GENERALIZED: ICD-10-CM

## 2019-10-24 DIAGNOSIS — R14.0 DISTENDED ABDOMEN: Primary | ICD-10-CM

## 2019-10-24 PROCEDURE — 74176 CT ABD & PELVIS W/O CONTRAST: CPT

## 2019-10-25 ENCOUNTER — TELEPHONE (OUTPATIENT)
Dept: FAMILY MEDICINE CLINIC | Facility: CLINIC | Age: 49
End: 2019-10-25

## 2019-10-25 DIAGNOSIS — N93.8 DUB (DYSFUNCTIONAL UTERINE BLEEDING): Primary | ICD-10-CM

## 2019-10-25 NOTE — TELEPHONE ENCOUNTER
Pt called and states she would like to cancel the referral to GASTRO. She thinks instead she needs to see GYN. She is asking if Dr Demarco will put a referral in to see Dr Perez. please call her at [phone]

## 2019-10-30 ENCOUNTER — APPOINTMENT (OUTPATIENT)
Dept: CT IMAGING | Facility: HOSPITAL | Age: 49
End: 2019-10-30

## 2019-11-04 ENCOUNTER — DOCUMENTATION (OUTPATIENT)
Dept: CARDIOLOGY | Facility: CLINIC | Age: 49
End: 2019-11-04

## 2019-11-14 ENCOUNTER — APPOINTMENT (OUTPATIENT)
Dept: NUCLEAR MEDICINE | Facility: HOSPITAL | Age: 49
End: 2019-11-14

## 2019-11-15 ENCOUNTER — APPOINTMENT (OUTPATIENT)
Dept: NUCLEAR MEDICINE | Facility: HOSPITAL | Age: 49
End: 2019-11-15

## 2019-11-15 ENCOUNTER — APPOINTMENT (OUTPATIENT)
Dept: CARDIOLOGY | Facility: HOSPITAL | Age: 49
End: 2019-11-15

## 2019-11-19 ENCOUNTER — HOSPITAL ENCOUNTER (OUTPATIENT)
Dept: NUCLEAR MEDICINE | Facility: HOSPITAL | Age: 49
Discharge: HOME | End: 2019-11-19

## 2019-11-19 DIAGNOSIS — R07.89 CHEST PRESSURE: ICD-10-CM

## 2019-11-19 PROCEDURE — 78452 HT MUSCLE IMAGE SPECT MULT: CPT

## 2019-11-19 PROCEDURE — 93017 CV STRESS TEST TRACING ONLY: CPT

## 2019-11-19 PROCEDURE — 0 TECHNETIUM SESTAMIBI: Performed by: INTERNAL MEDICINE

## 2019-11-19 PROCEDURE — A9500 TC99M SESTAMIBI: HCPCS | Performed by: INTERNAL MEDICINE

## 2019-11-19 RX ADMIN — TECHNETIUM TC 99M SESTAMIBI 1 DOSE: 1 INJECTION INTRAVENOUS at 07:46

## 2019-11-20 ENCOUNTER — HOSPITAL ENCOUNTER (OUTPATIENT)
Dept: CARDIOLOGY | Facility: HOSPITAL | Age: 49
Discharge: HOME | End: 2019-11-20

## 2019-11-20 ENCOUNTER — HOSPITAL ENCOUNTER (OUTPATIENT)
Dept: NUCLEAR MEDICINE | Facility: HOSPITAL | Age: 49
Discharge: HOME | End: 2019-11-20

## 2019-11-20 LAB
BH CV STRESS BP STAGE 1: NORMAL
BH CV STRESS DOSE REGADENOSON STAGE 1: 0.4
BH CV STRESS DURATION MIN STAGE 1: 3
BH CV STRESS DURATION SEC STAGE 1: 0
BH CV STRESS GRADE STAGE 1: 10
BH CV STRESS HR STAGE 1: 120
BH CV STRESS METS STAGE 1: 5
BH CV STRESS PROTOCOL 1: NORMAL
BH CV STRESS PROTOCOL 2 BP STAGE 1: NORMAL
BH CV STRESS PROTOCOL 2 COMMENTS STAGE 1: NORMAL
BH CV STRESS PROTOCOL 2 DOSE REGADENOSON STAGE 1: 0.4
BH CV STRESS PROTOCOL 2 DURATION MIN STAGE 1: 0
BH CV STRESS PROTOCOL 2 DURATION SEC STAGE 1: 10
BH CV STRESS PROTOCOL 2 HR STAGE 1: 99
BH CV STRESS PROTOCOL 2 STAGE 1: 1
BH CV STRESS PROTOCOL 2: NORMAL
BH CV STRESS RECOVERY BP: NORMAL MMHG
BH CV STRESS RECOVERY HR: 88 BPM
BH CV STRESS SPEED STAGE 1: 1.7
BH CV STRESS STAGE 1: 1
LV EF NUC BP: 72 %
MAXIMAL PREDICTED HEART RATE: 169 BPM
PERCENT MAX PREDICTED HR: 62.13 %
STRESS BASELINE BP: NORMAL MMHG
STRESS BASELINE HR: 74 BPM
STRESS PERCENT HR: 73 %
STRESS POST ESTIMATED WORKLOAD: 4.6 METS
STRESS POST EXERCISE DUR MIN: 2 MIN
STRESS POST EXERCISE DUR SEC: 48 SEC
STRESS POST PEAK BP: NORMAL MMHG
STRESS POST PEAK HR: 105 BPM
STRESS TARGET HR: 144 BPM

## 2019-11-20 PROCEDURE — 25010000002 REGADENOSON 0.4 MG/5ML SOLUTION: Performed by: INTERNAL MEDICINE

## 2019-11-20 PROCEDURE — 78452 HT MUSCLE IMAGE SPECT MULT: CPT | Performed by: INTERNAL MEDICINE

## 2019-11-20 PROCEDURE — 0 TECHNETIUM SESTAMIBI: Performed by: INTERNAL MEDICINE

## 2019-11-20 PROCEDURE — A9500 TC99M SESTAMIBI: HCPCS | Performed by: INTERNAL MEDICINE

## 2019-11-20 PROCEDURE — 93018 CV STRESS TEST I&R ONLY: CPT | Performed by: INTERNAL MEDICINE

## 2019-11-20 PROCEDURE — 93016 CV STRESS TEST SUPVJ ONLY: CPT | Performed by: INTERNAL MEDICINE

## 2019-11-20 RX ORDER — SODIUM CHLORIDE 0.9 % (FLUSH) 0.9 %
10 SYRINGE (ML) INJECTION ONCE
Status: COMPLETED | OUTPATIENT
Start: 2019-11-20 | End: 2019-11-20

## 2019-11-20 RX ADMIN — REGADENOSON 0.4 MG: 0.08 INJECTION, SOLUTION INTRAVENOUS at 08:43

## 2019-11-20 RX ADMIN — SODIUM CHLORIDE, PRESERVATIVE FREE 10 ML: 5 INJECTION INTRAVENOUS at 08:43

## 2019-11-20 RX ADMIN — TECHNETIUM TC 99M SESTAMIBI 1 DOSE: 1 INJECTION INTRAVENOUS at 08:43

## 2019-11-22 ENCOUNTER — TELEPHONE (OUTPATIENT)
Dept: CARDIOLOGY | Facility: CLINIC | Age: 49
End: 2019-11-22

## 2019-11-25 ENCOUNTER — TELEPHONE (OUTPATIENT)
Dept: CARDIOLOGY | Facility: CLINIC | Age: 49
End: 2019-11-25

## 2019-11-25 NOTE — TELEPHONE ENCOUNTER
Tried to call both numbers to discuss stress test results and need for heart cath recommended by Dr Salazar. No voicemail on either number. Unable to leave message.

## 2019-11-26 ENCOUNTER — OFFICE VISIT (OUTPATIENT)
Dept: FAMILY MEDICINE CLINIC | Facility: CLINIC | Age: 49
End: 2019-11-26

## 2019-11-26 VITALS
WEIGHT: 208.8 LBS | SYSTOLIC BLOOD PRESSURE: 124 MMHG | HEIGHT: 66 IN | BODY MASS INDEX: 33.56 KG/M2 | DIASTOLIC BLOOD PRESSURE: 78 MMHG

## 2019-11-26 DIAGNOSIS — E78.00 HYPERCHOLESTEREMIA: ICD-10-CM

## 2019-11-26 DIAGNOSIS — K08.89 PAIN, DENTAL: Primary | ICD-10-CM

## 2019-11-26 DIAGNOSIS — I10 BENIGN HYPERTENSION: Chronic | ICD-10-CM

## 2019-11-26 DIAGNOSIS — Z23 NEED FOR IMMUNIZATION AGAINST INFLUENZA: ICD-10-CM

## 2019-11-26 DIAGNOSIS — J06.9 ACUTE URI: ICD-10-CM

## 2019-11-26 DIAGNOSIS — Z91.199 GENERAL PATIENT NONCOMPLIANCE: Chronic | ICD-10-CM

## 2019-11-26 PROCEDURE — G0008 ADMIN INFLUENZA VIRUS VAC: HCPCS | Performed by: FAMILY MEDICINE

## 2019-11-26 PROCEDURE — 90674 CCIIV4 VAC NO PRSV 0.5 ML IM: CPT | Performed by: FAMILY MEDICINE

## 2019-11-26 PROCEDURE — 99214 OFFICE O/P EST MOD 30 MIN: CPT | Performed by: FAMILY MEDICINE

## 2019-11-26 RX ORDER — GUAIFENESIN 600 MG/1
TABLET, EXTENDED RELEASE ORAL
Qty: 30 TABLET | Refills: 1 | Status: SHIPPED | OUTPATIENT
Start: 2019-11-26 | End: 2020-01-21 | Stop reason: SDUPTHER

## 2019-11-26 RX ORDER — ATORVASTATIN CALCIUM 40 MG/1
40 TABLET, FILM COATED ORAL DAILY
Qty: 90 TABLET | Refills: 3 | Status: SHIPPED | OUTPATIENT
Start: 2019-11-26

## 2019-11-26 RX ORDER — CLINDAMYCIN HYDROCHLORIDE 150 MG/1
150 CAPSULE ORAL 3 TIMES DAILY
Qty: 21 CAPSULE | Refills: 0 | Status: SHIPPED | OUTPATIENT
Start: 2019-11-26 | End: 2020-01-21

## 2019-11-26 RX ORDER — AMLODIPINE BESYLATE 10 MG/1
10 TABLET ORAL DAILY
Qty: 90 TABLET | Refills: 3 | Status: SHIPPED | OUTPATIENT
Start: 2019-11-26

## 2019-11-26 NOTE — PROGRESS NOTES
Subjective   Kesha Arevalo is a 49 y.o. female.     History of Present Illness   requesting evaluation of dental pain postnasal drip congestion URI symptoms chronic back pain etc.  Unfortunately a great deal confabulation rambling history.  Previously noncompliant self-destructive behavior noted.  Did not visit GYN as outlined does remember missing appointment did remember doing his treadmill.  Note his last drug screen was negative.  History as below  HPI    The following portions of the patient's history were reviewed and updated as appropriate: allergies, current medications, past family history, past medical history, past social history, past surgical history and problem list.    Review of Systems  Review of Systems   Constitutional: Negative for activity change, appetite change, fatigue and unexpected weight change.   HENT: Positive for congestion, dental problem and sore throat. Negative for trouble swallowing and voice change.    Eyes: Negative for redness and visual disturbance.   Respiratory: Negative for cough and wheezing.    Cardiovascular: Negative for chest pain and palpitations.   Gastrointestinal: Negative for abdominal pain, constipation, diarrhea, nausea and vomiting.   Genitourinary: Negative for urgency.   Musculoskeletal: Positive for back pain. Negative for joint swelling.   Neurological: Negative for syncope and headaches.   Hematological: Negative for adenopathy.   Psychiatric/Behavioral: Negative for sleep disturbance.       Objective   Physical Exam  Physical Exam   Constitutional: She is oriented to person, place, and time. She appears well-developed.   HENT:   Head: Normocephalic.   Right Ear: External ear normal.   Left Ear: External ear normal.   Nose: Mucosal edema present.   Mouth/Throat: Oropharynx is clear and moist.       Mild dental pain minimal swelling right upper incisor.  Dentition is adequate to poor   Eyes: Pupils are equal, round, and reactive to light.   Neck: Normal  "range of motion. No thyromegaly present.   Cardiovascular: Normal rate, regular rhythm, normal heart sounds and intact distal pulses. Exam reveals no gallop and no friction rub.   No murmur heard.  Pulmonary/Chest: Breath sounds normal.   Abdominal: Soft. She exhibits no distension and no mass. There is no tenderness.   Musculoskeletal: Normal range of motion.   Get up and go 3 seconds   Neurological: She is alert and oriented to person, place, and time. She has normal reflexes.   Skin: Skin is warm and dry.   Psychiatric: Her affect is labile. Her speech is rapid and/or pressured. She is hyperactive.   As above         Visit Vitals  /78   Ht 167.6 cm (66\")   Wt 94.7 kg (208 lb 12.8 oz)   BMI 33.70 kg/m²     Body mass index is 33.7 kg/m².      Assessment/Plan   Kesha was seen today for dental pain, back pain and sore throat.    Diagnoses and all orders for this visit:    Pain, dental  -     clindamycin (CLEOCIN) 150 MG capsule; Take 1 capsule by mouth 3 (Three) Times a Day. For tooth till gone    Need for immunization against influenza  -     Flucelvax Quad=>4Years (Vial)    Acute URI  -     guaiFENesin (MUCINEX) 600 MG 12 hr tablet; 1 bid prn congestion    General patient noncompliance    Hypercholesteremia  -     atorvastatin (LIPITOR) 40 MG tablet; Take 1 tablet by mouth Daily. For chol    Benign hypertension  -     amLODIPine (NORVASC) 10 MG tablet; Take 1 tablet by mouth Daily. For bp  New dosing    Counseled staying on all regular medicines.  Counseled keeping follow-up.  Again self-destructive behavior makes care difficult  visiting with a dentist medication for the teeth symptomatic relief rechecks directed  "

## 2019-11-27 ENCOUNTER — TELEPHONE (OUTPATIENT)
Dept: CARDIOLOGY | Facility: CLINIC | Age: 49
End: 2019-11-27

## 2019-12-02 ENCOUNTER — TELEPHONE (OUTPATIENT)
Dept: CARDIOLOGY | Facility: CLINIC | Age: 49
End: 2019-12-02

## 2020-01-21 ENCOUNTER — OFFICE VISIT (OUTPATIENT)
Dept: FAMILY MEDICINE CLINIC | Facility: CLINIC | Age: 50
End: 2020-01-21

## 2020-01-21 VITALS
TEMPERATURE: 98 F | HEIGHT: 66 IN | DIASTOLIC BLOOD PRESSURE: 80 MMHG | SYSTOLIC BLOOD PRESSURE: 124 MMHG | BODY MASS INDEX: 33.97 KG/M2 | WEIGHT: 211.4 LBS

## 2020-01-21 DIAGNOSIS — Z72.0 TOBACCO USE: Chronic | ICD-10-CM

## 2020-01-21 DIAGNOSIS — J06.9 ACUTE URI: ICD-10-CM

## 2020-01-21 DIAGNOSIS — M79.10 MYALGIA: ICD-10-CM

## 2020-01-21 DIAGNOSIS — R05.9 COUGH: Primary | ICD-10-CM

## 2020-01-21 DIAGNOSIS — J40 BRONCHITIS: ICD-10-CM

## 2020-01-21 LAB
EXPIRATION DATE: NORMAL
FLUAV AG NPH QL: NEGATIVE
FLUBV AG NPH QL: NEGATIVE
INTERNAL CONTROL: NORMAL
Lab: NORMAL

## 2020-01-21 PROCEDURE — 87804 INFLUENZA ASSAY W/OPTIC: CPT | Performed by: FAMILY MEDICINE

## 2020-01-21 PROCEDURE — 99213 OFFICE O/P EST LOW 20 MIN: CPT | Performed by: FAMILY MEDICINE

## 2020-01-21 RX ORDER — GUAIFENESIN 600 MG/1
TABLET, EXTENDED RELEASE ORAL
Qty: 30 TABLET | Refills: 1 | Status: SHIPPED | OUTPATIENT
Start: 2020-01-21

## 2020-01-21 RX ORDER — PREDNISONE 20 MG/1
TABLET ORAL
Qty: 10 TABLET | Refills: 0 | Status: SHIPPED | OUTPATIENT
Start: 2020-01-21

## 2020-01-21 RX ORDER — ALBUTEROL SULFATE 90 UG/1
2 AEROSOL, METERED RESPIRATORY (INHALATION) EVERY 4 HOURS PRN
Qty: 1 INHALER | Refills: 2 | Status: SHIPPED | OUTPATIENT
Start: 2020-01-21

## 2020-01-21 RX ORDER — DOXYCYCLINE HYCLATE 100 MG/1
CAPSULE ORAL
Qty: 20 CAPSULE | Refills: 0 | Status: SHIPPED | OUTPATIENT
Start: 2020-01-21

## 2020-01-21 NOTE — PROGRESS NOTES
Subjective   Kesha Arevalo is a 50 y.o. female.     History of Present Illness   requesting evaluation 5-day history of cough congestion coryza mild wheeze sputum production body aches etc.  Continues to smoke.  Questionable exposure.  Not been treating with any medication.  History noted.  HPI    The following portions of the patient's history were reviewed and updated as appropriate: allergies, current medications, past family history, past medical history, past social history, past surgical history and problem list.    Review of Systems  Review of Systems   Constitutional: Negative for activity change, appetite change, fatigue and unexpected weight change.   HENT: Positive for congestion, postnasal drip and rhinorrhea. Negative for trouble swallowing and voice change.    Eyes: Negative for redness and visual disturbance.   Respiratory: Positive for cough and wheezing.    Cardiovascular: Negative for chest pain and palpitations.   Gastrointestinal: Negative for abdominal pain, constipation, diarrhea, nausea and vomiting.   Genitourinary: Negative for urgency.   Musculoskeletal: Positive for myalgias. Negative for joint swelling.   Neurological: Negative for syncope and headaches.   Hematological: Negative for adenopathy.   Psychiatric/Behavioral: Negative for sleep disturbance.       Objective   Physical Exam  Physical Exam   Constitutional: She is oriented to person, place, and time. She appears well-developed.   HENT:   Head: Normocephalic.   Right Ear: External ear normal.   Left Ear: External ear normal.   Nose: Mucosal edema and rhinorrhea present.   Mouth/Throat: Posterior oropharyngeal erythema present.   Eyes: Pupils are equal, round, and reactive to light.   Neck: Normal range of motion. No thyromegaly present.   Cardiovascular: Normal rate, regular rhythm, normal heart sounds and intact distal pulses. Exam reveals no gallop and no friction rub.   No murmur heard.  Pulmonary/Chest: She has wheezes  "in the right lower field and the left lower field. She has rhonchi in the right lower field and the left lower field.   Scant to mild bases normal rate and phase   Abdominal: Soft. She exhibits no distension and no mass. There is no tenderness.   Musculoskeletal: Normal range of motion.   Neurological: She is alert and oriented to person, place, and time. She has normal reflexes.   Skin: Skin is warm and dry.   Psychiatric: She has a normal mood and affect. Her speech is normal and behavior is normal.   No distress     Flu negative    Visit Vitals  /80   Temp 98 °F (36.7 °C)   Ht 167.6 cm (66\")   Wt 95.9 kg (211 lb 6.4 oz)   BMI 34.12 kg/m²     Body mass index is 34.12 kg/m².      Assessment/Plan   Kesha was seen today for cough, nasal congestion, generalized body aches, diarrhea and fever.    Diagnoses and all orders for this visit:    Cough  -     POC Influenza A / B    Acute URI  -     POC Influenza A / B  -     guaiFENesin (MUCINEX) 600 MG 12 hr tablet; 1 bid prn congestion    Myalgia  -     POC Influenza A / B    Tobacco use    Bronchitis  -     guaiFENesin (MUCINEX) 600 MG 12 hr tablet; 1 bid prn congestion  -     albuterol sulfate  (90 Base) MCG/ACT inhaler; Inhale 2 puffs Every 4 (Four) Hours As Needed for Wheezing.  -     doxycycline (VIBRAMYCIN) 100 MG capsule; 1 bid with food for lungs till gone  -     predniSONE (DELTASONE) 20 MG tablet; 2qdx 5     on stopping smoking.  3-10m     Counseled mainly on lifestyle measures hydration vaporizers etc. short-term medications observation rechecks directed  "

## 2020-11-24 ENCOUNTER — HOSPITAL ENCOUNTER (EMERGENCY)
Age: 50
Discharge: HOME | End: 2020-11-24
Payer: MEDICARE

## 2020-11-24 ENCOUNTER — APPOINTMENT (OUTPATIENT)
Dept: CT IMAGING | Age: 50
End: 2020-11-24
Payer: MEDICARE

## 2020-11-24 VITALS
HEART RATE: 90 BPM | WEIGHT: 200 LBS | OXYGEN SATURATION: 98 % | DIASTOLIC BLOOD PRESSURE: 88 MMHG | HEIGHT: 66 IN | TEMPERATURE: 97.4 F | RESPIRATION RATE: 18 BRPM | SYSTOLIC BLOOD PRESSURE: 126 MMHG | BODY MASS INDEX: 32.14 KG/M2

## 2020-11-24 LAB
ALBUMIN SERPL-MCNC: 4 G/DL (ref 3.5–5.2)
ALP BLD-CCNC: 94 U/L (ref 35–104)
ALT SERPL-CCNC: 30 U/L (ref 5–33)
ANION GAP SERPL CALCULATED.3IONS-SCNC: 10 MMOL/L (ref 7–19)
AST SERPL-CCNC: 26 U/L (ref 5–32)
BACTERIA: NEGATIVE /HPF
BASOPHILS ABSOLUTE: 0.1 K/UL (ref 0–0.2)
BASOPHILS RELATIVE PERCENT: 0.6 % (ref 0–1)
BILIRUB SERPL-MCNC: 0.3 MG/DL (ref 0.2–1.2)
BILIRUBIN URINE: NEGATIVE
BLOOD, URINE: NEGATIVE
BUN BLDV-MCNC: 13 MG/DL (ref 6–20)
CALCIUM SERPL-MCNC: 9.9 MG/DL (ref 8.6–10)
CHLORIDE BLD-SCNC: 106 MMOL/L (ref 98–111)
CLARITY: CLEAR
CO2: 24 MMOL/L (ref 22–29)
COLOR: YELLOW
CREAT SERPL-MCNC: 0.8 MG/DL (ref 0.5–0.9)
CRYSTALS, UA: ABNORMAL /HPF
EOSINOPHILS ABSOLUTE: 0.3 K/UL (ref 0–0.6)
EOSINOPHILS RELATIVE PERCENT: 2.5 % (ref 0–5)
EPITHELIAL CELLS, UA: 4 /HPF (ref 0–5)
GFR AFRICAN AMERICAN: >59
GFR NON-AFRICAN AMERICAN: >60
GLUCOSE BLD-MCNC: 163 MG/DL (ref 74–109)
GLUCOSE URINE: NEGATIVE MG/DL
HCT VFR BLD CALC: 42.7 % (ref 37–47)
HEMOGLOBIN: 13.9 G/DL (ref 12–16)
HYALINE CASTS: 1 /HPF (ref 0–8)
IMMATURE GRANULOCYTES #: 0 K/UL
KETONES, URINE: NEGATIVE MG/DL
LEUKOCYTE ESTERASE, URINE: ABNORMAL
LIPASE: 47 U/L (ref 13–60)
LYMPHOCYTES ABSOLUTE: 2.6 K/UL (ref 1.1–4.5)
LYMPHOCYTES RELATIVE PERCENT: 25.8 % (ref 20–40)
MCH RBC QN AUTO: 27.9 PG (ref 27–31)
MCHC RBC AUTO-ENTMCNC: 32.6 G/DL (ref 33–37)
MCV RBC AUTO: 85.7 FL (ref 81–99)
MONOCYTES ABSOLUTE: 1 K/UL (ref 0–0.9)
MONOCYTES RELATIVE PERCENT: 9.3 % (ref 0–10)
NEUTROPHILS ABSOLUTE: 6.3 K/UL (ref 1.5–7.5)
NEUTROPHILS RELATIVE PERCENT: 61.5 % (ref 50–65)
NITRITE, URINE: NEGATIVE
PDW BLD-RTO: 15.9 % (ref 11.5–14.5)
PH UA: 6.5 (ref 5–8)
PLATELET # BLD: 303 K/UL (ref 130–400)
PMV BLD AUTO: 10.2 FL (ref 9.4–12.3)
POTASSIUM REFLEX MAGNESIUM: 4.2 MMOL/L (ref 3.5–5)
PROTEIN UA: 100 MG/DL
RBC # BLD: 4.98 M/UL (ref 4.2–5.4)
RBC UA: 2 /HPF (ref 0–4)
SODIUM BLD-SCNC: 140 MMOL/L (ref 136–145)
SPECIFIC GRAVITY UA: >=1.045 (ref 1–1.03)
TOTAL PROTEIN: 7.7 G/DL (ref 6.6–8.7)
UROBILINOGEN, URINE: 0.2 E.U./DL
WBC # BLD: 10.2 K/UL (ref 4.8–10.8)
WBC UA: 2 /HPF (ref 0–5)

## 2020-11-24 PROCEDURE — 6360000004 HC RX CONTRAST MEDICATION: Performed by: PHYSICIAN ASSISTANT

## 2020-11-24 PROCEDURE — 85025 COMPLETE CBC W/AUTO DIFF WBC: CPT

## 2020-11-24 PROCEDURE — 99284 EMERGENCY DEPT VISIT MOD MDM: CPT

## 2020-11-24 PROCEDURE — 74177 CT ABD & PELVIS W/CONTRAST: CPT

## 2020-11-24 PROCEDURE — 83690 ASSAY OF LIPASE: CPT

## 2020-11-24 PROCEDURE — 2580000003 HC RX 258: Performed by: PHYSICIAN ASSISTANT

## 2020-11-24 PROCEDURE — 6360000002 HC RX W HCPCS: Performed by: PHYSICIAN ASSISTANT

## 2020-11-24 PROCEDURE — 81001 URINALYSIS AUTO W/SCOPE: CPT

## 2020-11-24 PROCEDURE — 36415 COLL VENOUS BLD VENIPUNCTURE: CPT

## 2020-11-24 PROCEDURE — 80053 COMPREHEN METABOLIC PANEL: CPT

## 2020-11-24 PROCEDURE — 96374 THER/PROPH/DIAG INJ IV PUSH: CPT

## 2020-11-24 RX ORDER — 0.9 % SODIUM CHLORIDE 0.9 %
1000 INTRAVENOUS SOLUTION INTRAVENOUS ONCE
Status: COMPLETED | OUTPATIENT
Start: 2020-11-24 | End: 2020-11-24

## 2020-11-24 RX ORDER — ONDANSETRON 2 MG/ML
4 INJECTION INTRAMUSCULAR; INTRAVENOUS ONCE
Status: COMPLETED | OUTPATIENT
Start: 2020-11-24 | End: 2020-11-24

## 2020-11-24 RX ORDER — MORPHINE SULFATE 4 MG/ML
4 INJECTION, SOLUTION INTRAMUSCULAR; INTRAVENOUS ONCE
Status: COMPLETED | OUTPATIENT
Start: 2020-11-24 | End: 2020-11-24

## 2020-11-24 RX ORDER — PANTOPRAZOLE SODIUM 20 MG/1
20 TABLET, DELAYED RELEASE ORAL DAILY
Qty: 30 TABLET | Refills: 0 | Status: SHIPPED | OUTPATIENT
Start: 2020-11-24

## 2020-11-24 RX ADMIN — ONDANSETRON HYDROCHLORIDE 4 MG: 2 SOLUTION INTRAMUSCULAR; INTRAVENOUS at 11:43

## 2020-11-24 RX ADMIN — MORPHINE SULFATE 4 MG: 4 INJECTION, SOLUTION INTRAMUSCULAR; INTRAVENOUS at 11:43

## 2020-11-24 RX ADMIN — IOPAMIDOL 90 ML: 755 INJECTION, SOLUTION INTRAVENOUS at 11:20

## 2020-11-24 RX ADMIN — SODIUM CHLORIDE 1000 ML: 9 INJECTION, SOLUTION INTRAVENOUS at 11:43

## 2020-11-24 ASSESSMENT — ENCOUNTER SYMPTOMS
EYE PAIN: 0
VOMITING: 0
ABDOMINAL PAIN: 1
COUGH: 0
BACK PAIN: 0
COLOR CHANGE: 0
SORE THROAT: 0
ABDOMINAL DISTENTION: 0
RHINORRHEA: 0
NAUSEA: 1
PHOTOPHOBIA: 0
APNEA: 0
SHORTNESS OF BREATH: 0
EYE DISCHARGE: 0

## 2020-11-24 ASSESSMENT — PAIN SCALES - GENERAL
PAINLEVEL_OUTOF10: 9
PAINLEVEL_OUTOF10: 9

## 2020-11-24 NOTE — ED PROVIDER NOTES
[facility] EMERGENCY DEPT  eMERGENCYdEPARTMENT eNCOUnter      Pt Name: Liliana Jensen  MRN: [id number]  [place] 1970  Date of evaluation: 11/24/2020  Provider:MELLY Enriquez    CHIEF COMPLAINT       Chief Complaint   Patient presents with    Abdominal Pain         HISTORY OF PRESENT ILLNESS  (Location/Symptom, Timing/Onset, Context/Setting, Quality, Duration, Modifying Factors, Severity.)   Liliana Jensen is a 44 y.o. female who presents to the emergency department with complaints of generalized abdominal pain since Sunday. Feels febrile has no way to check at home. 97.4 here temporal. Patient admits to mild nausea no emesis. Has GERD hx with meds for cholesterol and HTN. No prior colonoscopy. Asthma hx no SOA resp symptoms. No myalgias. No loss of taste or smell. Admits to constipation making flatus. Fina Lo request #[id number]    Active cumulative morphine equivalent 0    HPI    Nursing Notes were reviewed and I agree. REVIEW OF SYSTEMS    (2-9 systems for level 4, 10 or more for level 5)     Review of Systems   Constitutional: Negative for activity change, appetite change, chills and fever. HENT: Negative for congestion, postnasal drip, rhinorrhea and sore throat. Eyes: Negative for photophobia, pain, discharge and visual disturbance. Respiratory: Negative for apnea, cough and shortness of breath. Cardiovascular: Negative for chest pain and leg swelling. Gastrointestinal: Positive for abdominal pain and nausea. Negative for abdominal distention and vomiting. Genitourinary: Negative for vaginal bleeding. Musculoskeletal: Negative for arthralgias, back pain, joint swelling, neck pain and neck stiffness. Skin: Negative for color change and rash. Neurological: Negative for dizziness, syncope, facial asymmetry and headaches. Hematological: Negative for adenopathy. Does not bruise/bleed easily. Psychiatric/Behavioral: Negative for agitation, behavioral problems and confusion. Except as noted above the remainder of the review of systems was reviewed and negative. PAST MEDICAL HISTORY     Past Medical History:   Diagnosis Date    Asthma     Atherosclerosis of native arteries of the extremities with intermittent claudication 2013    GERD (gastroesophageal reflux disease)     HTN (hypertension)     Hyperlipidemia          SURGICAL HISTORY       Past Surgical History:   Procedure Laterality Date     SECTION           CURRENT MEDICATIONS       Discharge Medication List as of 2020 12:24 PM      CONTINUE these medications which have NOT CHANGED    Details   atorvastatin (LIPITOR) 40 MG tablet Take 40 mg by mouthHistorical Med      amLODIPine (NORVASC) 10 MG tablet Take 10 mg by mouthHistorical Med             ALLERGIES     Pcn [penicillins]    FAMILY HISTORY       Family History   Problem Relation Age of Onset    Cancer Maternal Aunt     Diabetes Maternal Uncle     Diabetes Maternal Grandmother     Cancer Maternal Grandfather           SOCIAL HISTORY       Social History     Socioeconomic History    Marital status: Legally      Spouse name: Not on file    Number of children: Not on file    Years of education: Not on file    Highest education level: Not on file   Occupational History    Not on file   Social Needs    Financial resource strain: Not on file    Food insecurity     Worry: Not on file     Inability: Not on file    Transportation needs     Medical: Not on file     Non-medical: Not on file   Tobacco Use    Smoking status: Current Every Day Smoker     Packs/day: 1.50     Years: 30.00     Pack years: 45.00    Smokeless tobacco: Never Used   Substance and Sexual Activity    Alcohol use:  Yes     Alcohol/week: 3.0 standard drinks     Types: 3 Shots of liquor per week     Comment: occ    Drug use: No     Frequency: 10.0 times per week     Types: Marijuana    Sexual activity: Not on file   Lifestyle    Physical activity     Days per week: Not on file     Minutes per session: Not on file    Stress: Not on file   Relationships    Social connections     Talks on phone: Not on file     Gets together: Not on file     Attends Gnosticist service: Not on file     Active member of club or organization: Not on file     Attends meetings of clubs or organizations: Not on file     Relationship status: Not on file    Intimate partner violence     Fear of current or ex partner: Not on file     Emotionally abused: Not on file     Physically abused: Not on file     Forced sexual activity: Not on file   Other Topics Concern    Not on file   Social History Narrative    Not on file       SCREENINGS    [place] Coma Scale  Eye Opening: Spontaneous  Best Verbal Response: Oriented  Best Motor Response: Obeys commands  [place] Coma Scale Score: 15      PHYSICAL EXAM    (up to 7 forlevel 4, 8 or more for level 5)     ED Triage Vitals   BP Temp Temp src Pulse Resp SpO2 Height Weight   11/24/20 1031 11/24/20 1029 -- 11/24/20 1029 11/24/20 1029 11/24/20 1029 11/24/20 1029 11/24/20 1029   126/88 97.4 °F (36.3 °C)  104 18 98 % 5' 6\" (1.676 m) 200 lb (90.7 kg)       Physical Exam  Vitals signs and nursing note reviewed. Constitutional:       General: She is not in acute distress. Appearance: She is well-developed. She is not diaphoretic. HENT:      Head: Normocephalic and atraumatic. Right Ear: External ear normal.      Left Ear: External ear normal.      Mouth/Throat:      Pharynx: No oropharyngeal exudate. Eyes:      General:         Right eye: No discharge. Left eye: No discharge. Pupils: Pupils are equal, round, and reactive to light. Neck:      Musculoskeletal: Normal range of motion and neck supple. Thyroid: No thyromegaly. Cardiovascular:      Rate and Rhythm: Normal rate and regular rhythm. Heart sounds: Normal heart sounds. No murmur. No friction rub.    Pulmonary:      Effort: Pulmonary effort is normal. No respiratory distress. Breath sounds: Normal breath sounds. No stridor. No wheezing. Abdominal:      General: Bowel sounds are normal. There is no distension. Palpations: Abdomen is soft. Tenderness: There is no abdominal tenderness. Musculoskeletal: Normal range of motion. Skin:     General: Skin is warm and dry. Capillary Refill: Capillary refill takes less than 2 seconds. Findings: No rash. Neurological:      Mental Status: She is alert and oriented to person, place, and time. Cranial Nerves: No cranial nerve deficit. Sensory: No sensory deficit. Coordination: Coordination normal.   Psychiatric:         Behavior: Behavior normal.         Thought Content: Thought content normal.           DIAGNOSTIC RESULTS     RADIOLOGY:   Non-plain film images such as CT, Ultrasound and MRI are read by the radiologist. Plain radiographic images are visualized and preliminarilyinterpreted by No att. providers found with the below findings:    Interpretation per the Radiologist below, if available at the time of this note:    CT ABDOMEN PELVIS W IV CONTRAST Additional Contrast? None   Final Result   1. No acute findings. 2.  Cholelithiasis without evidence of cholecystitis. 3.  Liver is steatotic.    Signed by Dr Hans Mendiola on 11/24/2020 11:47 AM          LABS:  Labs Reviewed   CBC WITH AUTO DIFFERENTIAL - Abnormal; Notable for the following components:       Result Value    MCHC 32.6 (*)     RDW 15.9 (*)     Monocytes Absolute 1.00 (*)     All other components within normal limits   COMPREHENSIVE METABOLIC PANEL W/ REFLEX TO MG FOR LOW K - Abnormal; Notable for the following components:    Glucose 163 (*)     All other components within normal limits   URINE RT REFLEX TO CULTURE - Abnormal; Notable for the following components:    Protein,  (*)     Leukocyte Esterase, Urine TRACE (*)     All other components within normal limits   MICROSCOPIC URINALYSIS - Abnormal; Notable for the following components:    Bacteria, UA NEGATIVE (*)     Crystals, UA NEG (*)     All other components within normal limits   LIPASE       All other labs were within normal range or notreturned as of this dictation. RE-ASSESSMENT        EMERGENCY DEPARTMENT COURSE and DIFFERENTIAL DIAGNOSIS/MDM:   Vitals:    Vitals:    11/24/20 1029 11/24/20 1031 11/24/20 1041   BP:  126/88    Pulse: 104  90   Resp: 18     Temp: 97.4 °F (36.3 °C)     SpO2: 98%     Weight: 200 lb (90.7 kg)     Height: 5' 6\" (1.676 m)         MDM  Findings consistent with cholelithiasis no stone in the duct and no infection. Based on patient's gradual history I anticipate this being and underfunctioning gallbladder. The patient is new to the area from [facility] now living with her daughter and requesting PCP and psych referral.  Without any psychiatric complaints we did not work her up for potential psych admission. I given her number to [facility] as well as Stephania Alcala [place] [facility]. I would like her to set up HIDA scan through [facility] with potential for removal I also provided her with our on-call general surgeon number if she would like to utilize that as well. Patient's pain is treated here she passes p.o. challenge for documentation she is afebrile with no other symptoms correlating with Covid so ED elected to not do swab. Urine is clean. Plan for discharge. PROCEDURES:    Procedures      FINAL IMPRESSION      1. Lower abdominal pain    2. Calculus of gallbladder without cholecystitis without obstruction          DISPOSITION/PLAN   DISPOSITION Decision To Discharge 11/24/2020 12:22:41 PM      PATIENT REFERRED TO:  [facility] EMERGENCY DEPT  Gene Medina  [phone]    If symptoms worsen    [facility]  [address]   [place] [id number]  [phone]  Schedule an appointment as soon as possible for a visit   for HIDA scan    [address] [place] [address]    Schedule

## 2021-06-20 ENCOUNTER — HOSPITAL ENCOUNTER (EMERGENCY)
Age: 51
Discharge: HOME | End: 2021-06-20
Attending: EMERGENCY MEDICINE
Payer: MEDICARE

## 2021-06-20 ENCOUNTER — APPOINTMENT (OUTPATIENT)
Dept: CT IMAGING | Age: 51
End: 2021-06-20
Payer: MEDICARE

## 2021-06-20 VITALS
RESPIRATION RATE: 22 BRPM | WEIGHT: 190 LBS | HEART RATE: 90 BPM | DIASTOLIC BLOOD PRESSURE: 100 MMHG | BODY MASS INDEX: 30.53 KG/M2 | OXYGEN SATURATION: 94 % | SYSTOLIC BLOOD PRESSURE: 175 MMHG | HEIGHT: 66 IN | TEMPERATURE: 98.8 F

## 2021-06-20 DIAGNOSIS — R10.13 ABDOMINAL PAIN, EPIGASTRIC: Primary | ICD-10-CM

## 2021-06-20 DIAGNOSIS — K80.50 BILIARY COLIC: ICD-10-CM

## 2021-06-20 LAB
ALBUMIN SERPL-MCNC: 3.7 G/DL (ref 3.5–5.2)
ALP BLD-CCNC: 105 U/L (ref 35–104)
ALT SERPL-CCNC: 16 U/L (ref 5–33)
ANION GAP SERPL CALCULATED.3IONS-SCNC: 10 MMOL/L (ref 7–19)
AST SERPL-CCNC: 19 U/L (ref 5–32)
BACTERIA: NEGATIVE /HPF
BASOPHILS ABSOLUTE: 0.1 K/UL (ref 0–0.2)
BASOPHILS RELATIVE PERCENT: 0.8 % (ref 0–1)
BILIRUB SERPL-MCNC: <0.2 MG/DL (ref 0.2–1.2)
BILIRUBIN URINE: NEGATIVE
BLOOD, URINE: NEGATIVE
BUN BLDV-MCNC: 18 MG/DL (ref 6–20)
CALCIUM SERPL-MCNC: 9.4 MG/DL (ref 8.6–10)
CHLORIDE BLD-SCNC: 109 MMOL/L (ref 98–111)
CLARITY: CLEAR
CO2: 22 MMOL/L (ref 22–29)
COLOR: YELLOW
CREAT SERPL-MCNC: 0.9 MG/DL (ref 0.5–0.9)
CRYSTALS, UA: ABNORMAL /HPF
EOSINOPHILS ABSOLUTE: 0.2 K/UL (ref 0–0.6)
EOSINOPHILS RELATIVE PERCENT: 2 % (ref 0–5)
EPITHELIAL CELLS, UA: 4 /HPF (ref 0–5)
GFR AFRICAN AMERICAN: >59
GFR NON-AFRICAN AMERICAN: >60
GLUCOSE BLD-MCNC: 112 MG/DL (ref 74–109)
GLUCOSE URINE: NEGATIVE MG/DL
HCT VFR BLD CALC: 44.8 % (ref 37–47)
HEMOGLOBIN: 14.6 G/DL (ref 12–16)
HYALINE CASTS: 2 /HPF (ref 0–8)
IMMATURE GRANULOCYTES #: 0 K/UL
KETONES, URINE: NEGATIVE MG/DL
LEUKOCYTE ESTERASE, URINE: ABNORMAL
LIPASE: 35 U/L (ref 13–60)
LYMPHOCYTES ABSOLUTE: 2.7 K/UL (ref 1.1–4.5)
LYMPHOCYTES RELATIVE PERCENT: 26 % (ref 20–40)
MCH RBC QN AUTO: 28.2 PG (ref 27–31)
MCHC RBC AUTO-ENTMCNC: 32.6 G/DL (ref 33–37)
MCV RBC AUTO: 86.5 FL (ref 81–99)
MONOCYTES ABSOLUTE: 0.8 K/UL (ref 0–0.9)
MONOCYTES RELATIVE PERCENT: 7.6 % (ref 0–10)
NEUTROPHILS ABSOLUTE: 6.6 K/UL (ref 1.5–7.5)
NEUTROPHILS RELATIVE PERCENT: 63.2 % (ref 50–65)
NITRITE, URINE: NEGATIVE
PDW BLD-RTO: 16.1 % (ref 11.5–14.5)
PH UA: 5.5 (ref 5–8)
PLATELET # BLD: 289 K/UL (ref 130–400)
PMV BLD AUTO: 10.5 FL (ref 9.4–12.3)
POTASSIUM SERPL-SCNC: 4.2 MMOL/L (ref 3.5–5)
PROTEIN UA: 300 MG/DL
RBC # BLD: 5.18 M/UL (ref 4.2–5.4)
RBC UA: 2 /HPF (ref 0–4)
REASON FOR REJECTION: NORMAL
REJECTED TEST: NORMAL
SODIUM BLD-SCNC: 141 MMOL/L (ref 136–145)
SPECIFIC GRAVITY UA: 1.02 (ref 1–1.03)
TOTAL PROTEIN: 7.1 G/DL (ref 6.6–8.7)
TROPONIN: <0.01 NG/ML (ref 0–0.03)
UROBILINOGEN, URINE: 1 E.U./DL
WBC # BLD: 10.5 K/UL (ref 4.8–10.8)
WBC UA: 2 /HPF (ref 0–5)

## 2021-06-20 PROCEDURE — 80053 COMPREHEN METABOLIC PANEL: CPT

## 2021-06-20 PROCEDURE — 81001 URINALYSIS AUTO W/SCOPE: CPT

## 2021-06-20 PROCEDURE — 6360000002 HC RX W HCPCS: Performed by: EMERGENCY MEDICINE

## 2021-06-20 PROCEDURE — 85025 COMPLETE CBC W/AUTO DIFF WBC: CPT

## 2021-06-20 PROCEDURE — 36415 COLL VENOUS BLD VENIPUNCTURE: CPT

## 2021-06-20 PROCEDURE — 93005 ELECTROCARDIOGRAM TRACING: CPT | Performed by: EMERGENCY MEDICINE

## 2021-06-20 PROCEDURE — 96374 THER/PROPH/DIAG INJ IV PUSH: CPT

## 2021-06-20 PROCEDURE — 74177 CT ABD & PELVIS W/CONTRAST: CPT

## 2021-06-20 PROCEDURE — 84484 ASSAY OF TROPONIN QUANT: CPT

## 2021-06-20 PROCEDURE — 99284 EMERGENCY DEPT VISIT MOD MDM: CPT

## 2021-06-20 PROCEDURE — 96375 TX/PRO/DX INJ NEW DRUG ADDON: CPT

## 2021-06-20 PROCEDURE — 6360000004 HC RX CONTRAST MEDICATION: Performed by: EMERGENCY MEDICINE

## 2021-06-20 PROCEDURE — 83690 ASSAY OF LIPASE: CPT

## 2021-06-20 RX ORDER — DICYCLOMINE HYDROCHLORIDE 10 MG/1
10 CAPSULE ORAL
Qty: 60 CAPSULE | Refills: 0 | Status: SHIPPED | OUTPATIENT
Start: 2021-06-20

## 2021-06-20 RX ORDER — ONDANSETRON 4 MG/1
4 TABLET, ORALLY DISINTEGRATING ORAL EVERY 8 HOURS PRN
Qty: 30 TABLET | Refills: 0 | Status: SHIPPED | OUTPATIENT
Start: 2021-06-20

## 2021-06-20 RX ORDER — ONDANSETRON 2 MG/ML
4 INJECTION INTRAMUSCULAR; INTRAVENOUS ONCE
Status: COMPLETED | OUTPATIENT
Start: 2021-06-20 | End: 2021-06-20

## 2021-06-20 RX ORDER — MORPHINE SULFATE 4 MG/ML
4 INJECTION, SOLUTION INTRAMUSCULAR; INTRAVENOUS ONCE
Status: COMPLETED | OUTPATIENT
Start: 2021-06-20 | End: 2021-06-20

## 2021-06-20 RX ADMIN — MORPHINE SULFATE 4 MG: 4 INJECTION, SOLUTION INTRAMUSCULAR; INTRAVENOUS at 18:45

## 2021-06-20 RX ADMIN — ONDANSETRON 4 MG: 2 INJECTION INTRAMUSCULAR; INTRAVENOUS at 18:45

## 2021-06-20 RX ADMIN — IOPAMIDOL 95 ML: 755 INJECTION, SOLUTION INTRAVENOUS at 19:42

## 2021-06-20 ASSESSMENT — PAIN DESCRIPTION - LOCATION: LOCATION: ABDOMEN

## 2021-06-20 ASSESSMENT — ENCOUNTER SYMPTOMS
NAUSEA: 0
SHORTNESS OF BREATH: 0
DIARRHEA: 0
RHINORRHEA: 0
BACK PAIN: 0
COUGH: 0
VOMITING: 0
ABDOMINAL PAIN: 1
SORE THROAT: 0

## 2021-06-20 ASSESSMENT — PAIN DESCRIPTION - PAIN TYPE: TYPE: ACUTE PAIN

## 2021-06-20 ASSESSMENT — PAIN SCALES - GENERAL
PAINLEVEL_OUTOF10: 9
PAINLEVEL_OUTOF10: 10

## 2021-06-20 NOTE — ED PROVIDER NOTES
SURGICAL HISTORY       Past Surgical History:   Procedure Laterality Date     SECTION           CURRENT MEDICATIONS     Discharge Medication List as of 2021  8:18 PM      CONTINUE these medications which have NOT CHANGED    Details   pantoprazole (PROTONIX) 20 MG tablet Take 1 tablet by mouth daily, Disp-30 tablet,R-0Print      atorvastatin (LIPITOR) 40 MG tablet Take 40 mg by mouthHistorical Med      amLODIPine (NORVASC) 10 MG tablet Take 10 mg by mouthHistorical Med             ALLERGIES     Pcn [penicillins]    FAMILY HISTORY       Family History   Problem Relation Age of Onset    Cancer Maternal Aunt     Diabetes Maternal Uncle     Diabetes Maternal Grandmother     Cancer Maternal Grandfather           SOCIAL HISTORY       Social History     Socioeconomic History    Marital status: Legally      Spouse name: None    Number of children: None    Years of education: None    Highest education level: None   Occupational History    None   Tobacco Use    Smoking status: Current Every Day Smoker     Packs/day: 1.50     Years: 30.00     Pack years: 45.00    Smokeless tobacco: Never Used   Vaping Use    Vaping Use: Never used   Substance and Sexual Activity    Alcohol use: Yes     Alcohol/week: 3.0 standard drinks     Types: 3 Shots of liquor per week     Comment: occ    Drug use: No     Frequency: 10.0 times per week     Types: Marijuana    Sexual activity: None   Other Topics Concern    None   Social History Narrative    None     Social Determinants of Health     Financial Resource Strain:     Difficulty of Paying Living Expenses:    Food Insecurity:     Worried About Running Out of Food in the Last Year:     Ran Out of Food in the Last Year:    Transportation Needs:     Lack of Transportation (Medical):      Lack of Transportation (Non-Medical):    Physical Activity:     Days of Exercise per Week:     Minutes of Exercise per Session:    Stress:     Feeling of Stress :    Social Connections:     Frequency of Communication with Friends and Family:     Frequency of Social Gatherings with Friends and Family:     Attends Muslim Services:     Active Member of Clubs or Organizations:     Attends Club or Organization Meetings:     Marital Status:    Intimate Partner Violence:     Fear of Current or Ex-Partner:     Emotionally Abused:     Physically Abused:     Sexually Abused:        SCREENINGS    Chastity Coma Scale  Eye Opening: Spontaneous  Best Verbal Response: Oriented  Best Motor Response: Obeys commands  [place] Coma Scale Score: 15        PHYSICAL EXAM    (up to 7 for level 4, 8 or more for level 5)     ED Triage Vitals [06/20/21 1807]   BP Temp Temp src Pulse Resp SpO2 Height Weight   (!) 175/100 98.8 °F (37.1 °C) -- 87 22 94 % 5' 6\" (1.676 m) 190 lb (86.2 kg)       Physical Exam  Vitals and nursing note reviewed. Constitutional:       General: She is not in acute distress. Appearance: She is well-developed. She is ill-appearing. She is not diaphoretic. HENT:      Head: Normocephalic and atraumatic. Right Ear: External ear normal.      Left Ear: External ear normal.      Nose: Nose normal.      Mouth/Throat:      Mouth: Mucous membranes are moist.   Eyes:      Conjunctiva/sclera: Conjunctivae normal.   Neck:      Trachea: No tracheal deviation. Cardiovascular:      Rate and Rhythm: Normal rate and regular rhythm. Heart sounds: Normal heart sounds. No murmur heard. Pulmonary:      Effort: No respiratory distress. Breath sounds: Normal breath sounds. No wheezing or rales. Abdominal:      Palpations: Abdomen is soft. There is no mass. Tenderness: There is abdominal tenderness in the epigastric area. There is no right CVA tenderness or left CVA tenderness. Musculoskeletal:         General: Normal range of motion. Cervical back: Normal range of motion. Skin:     General: Skin is warm and dry.    Neurological:      Mental Status: She is alert and oriented to person, place, and time. GCS: GCS eye subscore is 4. GCS verbal subscore is 5. GCS motor subscore is 6. DIAGNOSTIC RESULTS     EKG: All EKG's areinterpreted by the Emergency Department Physician who either signs or Co-signs this chart in the absence of a cardiologist.    64 normal sinus rhythm no ST changes nondiagnostic EKG    RADIOLOGY:  Non-plain film images such as CT, Ultrasound and MRI are read by the radiologist. Plain radiographic images are visualized and preliminarily interpreted bythe emergency physician with the below findings:        CT ABDOMEN PELVIS W IV CONTRAST Additional Contrast? None   Final Result   1. No acute process in the abdomen or pelvis. Signed by Dr Reina Dodge:  Labs Reviewed   CBC WITH AUTO DIFFERENTIAL - Abnormal; Notable for the following components:       Result Value    MCHC 32.6 (*)     RDW 16.1 (*)     All other components within normal limits   URINE RT REFLEX TO CULTURE - Abnormal; Notable for the following components:    Leukocyte Esterase, Urine TRACE (*)     All other components within normal limits   MICROSCOPIC URINALYSIS - Abnormal; Notable for the following components:    Bacteria, UA NEGATIVE (*)     Crystals, UA NEG (*)     All other components within normal limits   COMPREHENSIVE METABOLIC PANEL - Abnormal; Notable for the following components:    Glucose 112 (*)     Alkaline Phosphatase 105 (*)     All other components within normal limits   TROPONIN   SPECIMEN REJECTION   LIPASE       All other labs were within normal range or not returned as of this dictation.     EMERGENCY DEPARTMENT COURSE and DIFFERENTIAL DIAGNOSIS/MDM:   Vitals:    Vitals:    06/20/21 1807 06/20/21 1832   BP: (!) 175/100    Pulse: 87 90   Resp: 22    Temp: 98.8 °F (37.1 °C)    SpO2: 94%    Weight: 190 lb (86.2 kg)    Height: 5' 6\" (1.676 m)        MDM  Number of Diagnoses or Management Options     Amount and/or Complexity of Data Reviewed  Clinical lab tests: ordered and reviewed  Tests in the radiology section of CPT®: ordered and reviewed  Independent visualization of images, tracings, or specimens: yes      VSS, epigastric acute onset pain, known hx of cholelithiasis and biliary colic, never had GB removed as planned, recently moved back, labs and imaging reassuring, can follow up as outpt with general surgery, understands return precautions      CONSULTS:  None    PROCEDURES:  Unless otherwise noted below, none     Procedures    FINAL IMPRESSION      1. Abdominal pain, epigastric    2.  Biliary colic          DISPOSITION/PLAN   DISPOSITION Decision To Discharge 06/20/2021 08:18:17 PM      PATIENT REFERRED TO:  Monae Canada DO  [address]  [phone]    Call in 1 day        DISCHARGE MEDICATIONS:  Discharge Medication List as of 6/20/2021  8:18 PM      START taking these medications    Details   dicyclomine (BENTYL) 10 MG capsule Take 1 capsule by mouth 4 times daily (before meals and nightly), Disp-60 capsule, R-0Normal      ondansetron (ZOFRAN ODT) 4 MG disintegrating tablet Take 1 tablet by mouth every 8 hours as needed for Nausea or Vomiting, Disp-30 tablet, R-0Normal                (Please note that portions of this note were completed with a voice recognition program.  Efforts were made to edit thedictations but occasionally words are mis-transcribed.)    Shannon Grier MD (electronically signed)  Attending Emergency Physician        Colt Santos MD  06/20/21 3742

## 2021-06-21 ENCOUNTER — ANESTHESIA EVENT (OUTPATIENT)
Dept: OPERATING ROOM | Age: 51
End: 2021-06-21
Payer: MEDICARE

## 2021-06-21 ENCOUNTER — HOSPITAL ENCOUNTER (OUTPATIENT)
Age: 51
Setting detail: OBSERVATION
Discharge: HOME | End: 2021-06-22
Attending: EMERGENCY MEDICINE | Admitting: INTERNAL MEDICINE
Payer: MEDICARE

## 2021-06-21 ENCOUNTER — APPOINTMENT (OUTPATIENT)
Dept: ULTRASOUND IMAGING | Age: 51
End: 2021-06-21
Payer: MEDICARE

## 2021-06-21 ENCOUNTER — ANESTHESIA (OUTPATIENT)
Dept: OPERATING ROOM | Age: 51
End: 2021-06-21
Payer: MEDICARE

## 2021-06-21 VITALS
RESPIRATION RATE: 15 BRPM | OXYGEN SATURATION: 100 % | SYSTOLIC BLOOD PRESSURE: 156 MMHG | DIASTOLIC BLOOD PRESSURE: 85 MMHG | TEMPERATURE: 99 F

## 2021-06-21 DIAGNOSIS — K81.9 CHOLECYSTITIS: ICD-10-CM

## 2021-06-21 DIAGNOSIS — K85.90 ACUTE PANCREATITIS WITHOUT INFECTION OR NECROSIS, UNSPECIFIED PANCREATITIS TYPE: ICD-10-CM

## 2021-06-21 DIAGNOSIS — R10.13 ABDOMINAL PAIN, EPIGASTRIC: Primary | ICD-10-CM

## 2021-06-21 DIAGNOSIS — Z90.49 S/P CHOLECYSTECTOMY: ICD-10-CM

## 2021-06-21 DIAGNOSIS — R11.2 NON-INTRACTABLE VOMITING WITH NAUSEA, UNSPECIFIED VOMITING TYPE: ICD-10-CM

## 2021-06-21 PROBLEM — R10.9 ABDOMINAL PAIN: Status: ACTIVE | Noted: 2021-06-21

## 2021-06-21 PROBLEM — E86.0 DEHYDRATION: Status: ACTIVE | Noted: 2021-06-21

## 2021-06-21 PROBLEM — R11.0 NAUSEA: Status: ACTIVE | Noted: 2021-06-21

## 2021-06-21 PROBLEM — E78.1 HYPERTRIGLYCERIDEMIA: Status: ACTIVE | Noted: 2021-06-21

## 2021-06-21 PROBLEM — E66.9 OBESITY (BMI 30-39.9): Status: ACTIVE | Noted: 2021-06-21

## 2021-06-21 PROBLEM — F10.10 ALCOHOL ABUSE, EPISODIC DRINKING BEHAVIOR: Status: ACTIVE | Noted: 2021-06-21

## 2021-06-21 PROBLEM — Z72.0 TOBACCO ABUSE: Status: ACTIVE | Noted: 2021-06-21

## 2021-06-21 PROBLEM — R74.8 ELEVATED LIPASE: Status: ACTIVE | Noted: 2021-06-21

## 2021-06-21 PROBLEM — I10 HYPERTENSION: Status: ACTIVE | Noted: 2021-06-21

## 2021-06-21 LAB
ALBUMIN SERPL-MCNC: 4 G/DL (ref 3.5–5.2)
ALP BLD-CCNC: 101 U/L (ref 35–104)
ALT SERPL-CCNC: 20 U/L (ref 5–33)
ANION GAP SERPL CALCULATED.3IONS-SCNC: 10 MMOL/L (ref 7–19)
AST SERPL-CCNC: 21 U/L (ref 5–32)
BASOPHILS ABSOLUTE: 0.1 K/UL (ref 0–0.2)
BASOPHILS RELATIVE PERCENT: 0.6 % (ref 0–1)
BILIRUB SERPL-MCNC: <0.2 MG/DL (ref 0.2–1.2)
BUN BLDV-MCNC: 16 MG/DL (ref 6–20)
CALCIUM SERPL-MCNC: 9.6 MG/DL (ref 8.6–10)
CHLORIDE BLD-SCNC: 104 MMOL/L (ref 98–111)
CHOLESTEROL, TOTAL: 323 MG/DL (ref 160–199)
CO2: 23 MMOL/L (ref 22–29)
CREAT SERPL-MCNC: 1.1 MG/DL (ref 0.5–0.9)
EKG P AXIS: 67 DEGREES
EKG P-R INTERVAL: 144 MS
EKG Q-T INTERVAL: 408 MS
EKG QRS DURATION: 86 MS
EKG QTC CALCULATION (BAZETT): 415 MS
EKG T AXIS: 48 DEGREES
EOSINOPHILS ABSOLUTE: 0.2 K/UL (ref 0–0.6)
EOSINOPHILS RELATIVE PERCENT: 1.2 % (ref 0–5)
GFR AFRICAN AMERICAN: >59
GFR NON-AFRICAN AMERICAN: 52
GLUCOSE BLD-MCNC: 125 MG/DL (ref 74–109)
HCT VFR BLD CALC: 44.8 % (ref 37–47)
HDLC SERPL-MCNC: 46 MG/DL (ref 65–121)
HEMOGLOBIN: 14.9 G/DL (ref 12–16)
IMMATURE GRANULOCYTES #: 0 K/UL
LDL CHOLESTEROL CALCULATED: 212 MG/DL
LIPASE: 141 U/L (ref 13–60)
LYMPHOCYTES ABSOLUTE: 2.4 K/UL (ref 1.1–4.5)
LYMPHOCYTES RELATIVE PERCENT: 15.4 % (ref 20–40)
MCH RBC QN AUTO: 28.4 PG (ref 27–31)
MCHC RBC AUTO-ENTMCNC: 33.3 G/DL (ref 33–37)
MCV RBC AUTO: 85.5 FL (ref 81–99)
MONOCYTES ABSOLUTE: 0.9 K/UL (ref 0–0.9)
MONOCYTES RELATIVE PERCENT: 5.9 % (ref 0–10)
NEUTROPHILS ABSOLUTE: 11.9 K/UL (ref 1.5–7.5)
NEUTROPHILS RELATIVE PERCENT: 76.6 % (ref 50–65)
PDW BLD-RTO: 15.9 % (ref 11.5–14.5)
PLATELET # BLD: 277 K/UL (ref 130–400)
PMV BLD AUTO: 10.2 FL (ref 9.4–12.3)
POTASSIUM SERPL-SCNC: 4.4 MMOL/L (ref 3.5–5)
RBC # BLD: 5.24 M/UL (ref 4.2–5.4)
SARS-COV-2, NAAT: NOT DETECTED
SODIUM BLD-SCNC: 137 MMOL/L (ref 136–145)
TOTAL PROTEIN: 7.8 G/DL (ref 6.6–8.7)
TRIGL SERPL-MCNC: 324 MG/DL (ref 0–149)
WBC # BLD: 15.6 K/UL (ref 4.8–10.8)

## 2021-06-21 PROCEDURE — G0378 HOSPITAL OBSERVATION PER HR: HCPCS

## 2021-06-21 PROCEDURE — 3700000000 HC ANESTHESIA ATTENDED CARE: Performed by: SURGERY

## 2021-06-21 PROCEDURE — 6370000000 HC RX 637 (ALT 250 FOR IP): Performed by: INTERNAL MEDICINE

## 2021-06-21 PROCEDURE — 96376 TX/PRO/DX INJ SAME DRUG ADON: CPT

## 2021-06-21 PROCEDURE — 88304 TISSUE EXAM BY PATHOLOGIST: CPT

## 2021-06-21 PROCEDURE — 3700000001 HC ADD 15 MINUTES (ANESTHESIA): Performed by: SURGERY

## 2021-06-21 PROCEDURE — 76705 ECHO EXAM OF ABDOMEN: CPT

## 2021-06-21 PROCEDURE — 2580000003 HC RX 258: Performed by: SURGERY

## 2021-06-21 PROCEDURE — 85025 COMPLETE CBC W/AUTO DIFF WBC: CPT

## 2021-06-21 PROCEDURE — 2709999900 HC NON-CHARGEABLE SUPPLY: Performed by: SURGERY

## 2021-06-21 PROCEDURE — 6360000002 HC RX W HCPCS: Performed by: INTERNAL MEDICINE

## 2021-06-21 PROCEDURE — 2500000003 HC RX 250 WO HCPCS: Performed by: ANESTHESIOLOGY

## 2021-06-21 PROCEDURE — 36415 COLL VENOUS BLD VENIPUNCTURE: CPT

## 2021-06-21 PROCEDURE — 96365 THER/PROPH/DIAG IV INF INIT: CPT

## 2021-06-21 PROCEDURE — 80061 LIPID PANEL: CPT

## 2021-06-21 PROCEDURE — 80053 COMPREHEN METABOLIC PANEL: CPT

## 2021-06-21 PROCEDURE — 6360000002 HC RX W HCPCS: Performed by: ANESTHESIOLOGY

## 2021-06-21 PROCEDURE — 7100000001 HC PACU RECOVERY - ADDTL 15 MIN: Performed by: SURGERY

## 2021-06-21 PROCEDURE — 47562 LAPAROSCOPIC CHOLECYSTECTOMY: CPT | Performed by: SURGERY

## 2021-06-21 PROCEDURE — 6360000002 HC RX W HCPCS: Performed by: EMERGENCY MEDICINE

## 2021-06-21 PROCEDURE — 2500000003 HC RX 250 WO HCPCS: Performed by: INTERNAL MEDICINE

## 2021-06-21 PROCEDURE — 96375 TX/PRO/DX INJ NEW DRUG ADDON: CPT

## 2021-06-21 PROCEDURE — 6360000002 HC RX W HCPCS: Performed by: SURGERY

## 2021-06-21 PROCEDURE — 99204 OFFICE O/P NEW MOD 45 MIN: CPT | Performed by: SURGERY

## 2021-06-21 PROCEDURE — 2580000003 HC RX 258: Performed by: EMERGENCY MEDICINE

## 2021-06-21 PROCEDURE — 6360000002 HC RX W HCPCS: Performed by: REGISTERED NURSE

## 2021-06-21 PROCEDURE — 96366 THER/PROPH/DIAG IV INF ADDON: CPT

## 2021-06-21 PROCEDURE — 3600000014 HC SURGERY LEVEL 4 ADDTL 15MIN: Performed by: SURGERY

## 2021-06-21 PROCEDURE — 2580000003 HC RX 258: Performed by: REGISTERED NURSE

## 2021-06-21 PROCEDURE — 2580000003 HC RX 258: Performed by: ANESTHESIOLOGY

## 2021-06-21 PROCEDURE — 6360000002 HC RX W HCPCS

## 2021-06-21 PROCEDURE — 2720000010 HC SURG SUPPLY STERILE: Performed by: SURGERY

## 2021-06-21 PROCEDURE — 2500000003 HC RX 250 WO HCPCS: Performed by: SURGERY

## 2021-06-21 PROCEDURE — 83690 ASSAY OF LIPASE: CPT

## 2021-06-21 PROCEDURE — 2500000003 HC RX 250 WO HCPCS: Performed by: REGISTERED NURSE

## 2021-06-21 PROCEDURE — 99283 EMERGENCY DEPT VISIT LOW MDM: CPT

## 2021-06-21 PROCEDURE — 2580000003 HC RX 258: Performed by: INTERNAL MEDICINE

## 2021-06-21 PROCEDURE — 87635 SARS-COV-2 COVID-19 AMP PRB: CPT

## 2021-06-21 PROCEDURE — 99214 OFFICE O/P EST MOD 30 MIN: CPT | Performed by: INTERNAL MEDICINE

## 2021-06-21 PROCEDURE — 96374 THER/PROPH/DIAG INJ IV PUSH: CPT

## 2021-06-21 PROCEDURE — 3600000004 HC SURGERY LEVEL 4 BASE: Performed by: SURGERY

## 2021-06-21 PROCEDURE — 7100000000 HC PACU RECOVERY - FIRST 15 MIN: Performed by: SURGERY

## 2021-06-21 RX ORDER — HYDROCODONE BITARTRATE AND ACETAMINOPHEN 5; 325 MG/1; MG/1
1 TABLET ORAL EVERY 4 HOURS PRN
Status: DISCONTINUED | OUTPATIENT
Start: 2021-06-21 | End: 2021-06-22 | Stop reason: HOSPADM

## 2021-06-21 RX ORDER — MEPERIDINE HYDROCHLORIDE 50 MG/ML
12.5 INJECTION INTRAMUSCULAR; INTRAVENOUS; SUBCUTANEOUS EVERY 5 MIN PRN
Status: DISCONTINUED | OUTPATIENT
Start: 2021-06-21 | End: 2021-06-21 | Stop reason: HOSPADM

## 2021-06-21 RX ORDER — MIDAZOLAM HYDROCHLORIDE 1 MG/ML
INJECTION INTRAMUSCULAR; INTRAVENOUS PRN
Status: DISCONTINUED | OUTPATIENT
Start: 2021-06-21 | End: 2021-06-21 | Stop reason: SDUPTHER

## 2021-06-21 RX ORDER — HYDROMORPHONE HYDROCHLORIDE 1 MG/ML
1 INJECTION, SOLUTION INTRAMUSCULAR; INTRAVENOUS; SUBCUTANEOUS EVERY 4 HOURS PRN
Status: DISCONTINUED | OUTPATIENT
Start: 2021-06-21 | End: 2021-06-22

## 2021-06-21 RX ORDER — EPHEDRINE SULFATE 50 MG/ML
INJECTION, SOLUTION INTRAVENOUS PRN
Status: DISCONTINUED | OUTPATIENT
Start: 2021-06-21 | End: 2021-06-21 | Stop reason: SDUPTHER

## 2021-06-21 RX ORDER — SODIUM CHLORIDE, SODIUM LACTATE, POTASSIUM CHLORIDE, CALCIUM CHLORIDE 600; 310; 30; 20 MG/100ML; MG/100ML; MG/100ML; MG/100ML
INJECTION, SOLUTION INTRAVENOUS CONTINUOUS
Status: DISCONTINUED | OUTPATIENT
Start: 2021-06-21 | End: 2021-06-21

## 2021-06-21 RX ORDER — HYDROMORPHONE HYDROCHLORIDE 1 MG/ML
1 INJECTION, SOLUTION INTRAMUSCULAR; INTRAVENOUS; SUBCUTANEOUS ONCE
Status: DISCONTINUED | OUTPATIENT
Start: 2021-06-21 | End: 2021-06-22 | Stop reason: HOSPADM

## 2021-06-21 RX ORDER — HYDROCODONE BITARTRATE AND ACETAMINOPHEN 5; 325 MG/1; MG/1
2 TABLET ORAL EVERY 4 HOURS PRN
Status: DISCONTINUED | OUTPATIENT
Start: 2021-06-21 | End: 2021-06-22 | Stop reason: HOSPADM

## 2021-06-21 RX ORDER — SODIUM CHLORIDE 9 MG/ML
25 INJECTION, SOLUTION INTRAVENOUS PRN
Status: DISCONTINUED | OUTPATIENT
Start: 2021-06-21 | End: 2021-06-22 | Stop reason: HOSPADM

## 2021-06-21 RX ORDER — ATORVASTATIN CALCIUM 20 MG/1
20 TABLET, FILM COATED ORAL DAILY
Status: DISCONTINUED | OUTPATIENT
Start: 2021-06-21 | End: 2021-06-22 | Stop reason: HOSPADM

## 2021-06-21 RX ORDER — GEMFIBROZIL 600 MG/1
600 TABLET, FILM COATED ORAL
Status: DISCONTINUED | OUTPATIENT
Start: 2021-06-21 | End: 2021-06-22 | Stop reason: HOSPADM

## 2021-06-21 RX ORDER — MORPHINE SULFATE 4 MG/ML
2 INJECTION, SOLUTION INTRAMUSCULAR; INTRAVENOUS EVERY 5 MIN PRN
Status: DISCONTINUED | OUTPATIENT
Start: 2021-06-21 | End: 2021-06-21 | Stop reason: HOSPADM

## 2021-06-21 RX ORDER — FENTANYL CITRATE 50 UG/ML
25 INJECTION, SOLUTION INTRAMUSCULAR; INTRAVENOUS
Status: DISCONTINUED | OUTPATIENT
Start: 2021-06-21 | End: 2021-06-21 | Stop reason: HOSPADM

## 2021-06-21 RX ORDER — HYDROMORPHONE HYDROCHLORIDE 1 MG/ML
0.5 INJECTION, SOLUTION INTRAMUSCULAR; INTRAVENOUS; SUBCUTANEOUS EVERY 5 MIN PRN
Status: DISCONTINUED | OUTPATIENT
Start: 2021-06-21 | End: 2021-06-21 | Stop reason: HOSPADM

## 2021-06-21 RX ORDER — SODIUM CHLORIDE, SODIUM LACTATE, POTASSIUM CHLORIDE, CALCIUM CHLORIDE 600; 310; 30; 20 MG/100ML; MG/100ML; MG/100ML; MG/100ML
1000 INJECTION, SOLUTION INTRAVENOUS ONCE
Status: COMPLETED | OUTPATIENT
Start: 2021-06-21 | End: 2021-06-21

## 2021-06-21 RX ORDER — SODIUM CHLORIDE 0.9 % (FLUSH) 0.9 %
5-40 SYRINGE (ML) INJECTION PRN
Status: DISCONTINUED | OUTPATIENT
Start: 2021-06-21 | End: 2021-06-22 | Stop reason: HOSPADM

## 2021-06-21 RX ORDER — SODIUM CHLORIDE 9 MG/ML
25 INJECTION, SOLUTION INTRAVENOUS PRN
Status: DISCONTINUED | OUTPATIENT
Start: 2021-06-21 | End: 2021-06-21 | Stop reason: HOSPADM

## 2021-06-21 RX ORDER — ONDANSETRON 2 MG/ML
4 INJECTION INTRAMUSCULAR; INTRAVENOUS ONCE
Status: COMPLETED | OUTPATIENT
Start: 2021-06-21 | End: 2021-06-21

## 2021-06-21 RX ORDER — LABETALOL HYDROCHLORIDE 5 MG/ML
5 INJECTION, SOLUTION INTRAVENOUS EVERY 10 MIN PRN
Status: DISCONTINUED | OUTPATIENT
Start: 2021-06-21 | End: 2021-06-21 | Stop reason: HOSPADM

## 2021-06-21 RX ORDER — ROCURONIUM BROMIDE 10 MG/ML
INJECTION, SOLUTION INTRAVENOUS PRN
Status: DISCONTINUED | OUTPATIENT
Start: 2021-06-21 | End: 2021-06-21 | Stop reason: SDUPTHER

## 2021-06-21 RX ORDER — DEXAMETHASONE SODIUM PHOSPHATE 10 MG/ML
INJECTION, SOLUTION INTRAMUSCULAR; INTRAVENOUS PRN
Status: DISCONTINUED | OUTPATIENT
Start: 2021-06-21 | End: 2021-06-21 | Stop reason: SDUPTHER

## 2021-06-21 RX ORDER — BUPIVACAINE HYDROCHLORIDE AND EPINEPHRINE 2.5; 5 MG/ML; UG/ML
INJECTION, SOLUTION INFILTRATION; PERINEURAL PRN
Status: DISCONTINUED | OUTPATIENT
Start: 2021-06-21 | End: 2021-06-21 | Stop reason: ALTCHOICE

## 2021-06-21 RX ORDER — SODIUM CHLORIDE 0.9 % (FLUSH) 0.9 %
5-40 SYRINGE (ML) INJECTION EVERY 12 HOURS SCHEDULED
Status: CANCELLED | OUTPATIENT
Start: 2021-06-21

## 2021-06-21 RX ORDER — LIDOCAINE HYDROCHLORIDE 10 MG/ML
INJECTION, SOLUTION EPIDURAL; INFILTRATION; INTRACAUDAL; PERINEURAL PRN
Status: DISCONTINUED | OUTPATIENT
Start: 2021-06-21 | End: 2021-06-21 | Stop reason: SDUPTHER

## 2021-06-21 RX ORDER — ONDANSETRON 2 MG/ML
4 INJECTION INTRAMUSCULAR; INTRAVENOUS EVERY 6 HOURS PRN
Status: DISCONTINUED | OUTPATIENT
Start: 2021-06-21 | End: 2021-06-22 | Stop reason: HOSPADM

## 2021-06-21 RX ORDER — METOCLOPRAMIDE HYDROCHLORIDE 5 MG/ML
10 INJECTION INTRAMUSCULAR; INTRAVENOUS
Status: DISCONTINUED | OUTPATIENT
Start: 2021-06-21 | End: 2021-06-21 | Stop reason: HOSPADM

## 2021-06-21 RX ORDER — NICOTINE 21 MG/24HR
1 PATCH, TRANSDERMAL 24 HOURS TRANSDERMAL DAILY
Status: DISCONTINUED | OUTPATIENT
Start: 2021-06-21 | End: 2021-06-22 | Stop reason: HOSPADM

## 2021-06-21 RX ORDER — SODIUM CHLORIDE, SODIUM LACTATE, POTASSIUM CHLORIDE, CALCIUM CHLORIDE 600; 310; 30; 20 MG/100ML; MG/100ML; MG/100ML; MG/100ML
INJECTION, SOLUTION INTRAVENOUS CONTINUOUS PRN
Status: DISCONTINUED | OUTPATIENT
Start: 2021-06-21 | End: 2021-06-21 | Stop reason: SDUPTHER

## 2021-06-21 RX ORDER — ENALAPRILAT 2.5 MG/2ML
1.25 INJECTION INTRAVENOUS
Status: DISCONTINUED | OUTPATIENT
Start: 2021-06-21 | End: 2021-06-21 | Stop reason: HOSPADM

## 2021-06-21 RX ORDER — SUCCINYLCHOLINE CHLORIDE 20 MG/ML
INJECTION INTRAMUSCULAR; INTRAVENOUS PRN
Status: DISCONTINUED | OUTPATIENT
Start: 2021-06-21 | End: 2021-06-21 | Stop reason: SDUPTHER

## 2021-06-21 RX ORDER — FENTANYL CITRATE 50 UG/ML
50 INJECTION, SOLUTION INTRAMUSCULAR; INTRAVENOUS
Status: DISCONTINUED | OUTPATIENT
Start: 2021-06-21 | End: 2021-06-21 | Stop reason: HOSPADM

## 2021-06-21 RX ORDER — MORPHINE SULFATE 4 MG/ML
4 INJECTION, SOLUTION INTRAMUSCULAR; INTRAVENOUS ONCE
Status: COMPLETED | OUTPATIENT
Start: 2021-06-21 | End: 2021-06-21

## 2021-06-21 RX ORDER — CIPROFLOXACIN 2 MG/ML
400 INJECTION, SOLUTION INTRAVENOUS EVERY 12 HOURS
Status: DISCONTINUED | OUTPATIENT
Start: 2021-06-21 | End: 2021-06-22 | Stop reason: HOSPADM

## 2021-06-21 RX ORDER — HYDRALAZINE HYDROCHLORIDE 20 MG/ML
5 INJECTION INTRAMUSCULAR; INTRAVENOUS EVERY 10 MIN PRN
Status: DISCONTINUED | OUTPATIENT
Start: 2021-06-21 | End: 2021-06-21 | Stop reason: HOSPADM

## 2021-06-21 RX ORDER — SODIUM CHLORIDE 0.9 % (FLUSH) 0.9 %
5-40 SYRINGE (ML) INJECTION PRN
Status: CANCELLED | OUTPATIENT
Start: 2021-06-21

## 2021-06-21 RX ORDER — SODIUM CHLORIDE 9 MG/ML
25 INJECTION, SOLUTION INTRAVENOUS PRN
Status: CANCELLED | OUTPATIENT
Start: 2021-06-21

## 2021-06-21 RX ORDER — SODIUM CHLORIDE 9 MG/ML
INJECTION, SOLUTION INTRAVENOUS CONTINUOUS
Status: DISCONTINUED | OUTPATIENT
Start: 2021-06-21 | End: 2021-06-21

## 2021-06-21 RX ORDER — PROPOFOL 10 MG/ML
INJECTION, EMULSION INTRAVENOUS PRN
Status: DISCONTINUED | OUTPATIENT
Start: 2021-06-21 | End: 2021-06-21 | Stop reason: SDUPTHER

## 2021-06-21 RX ORDER — 0.9 % SODIUM CHLORIDE 0.9 %
500 INTRAVENOUS SOLUTION INTRAVENOUS ONCE
Status: COMPLETED | OUTPATIENT
Start: 2021-06-21 | End: 2021-06-21

## 2021-06-21 RX ORDER — SODIUM CHLORIDE 0.9 % (FLUSH) 0.9 %
5-40 SYRINGE (ML) INJECTION PRN
Status: DISCONTINUED | OUTPATIENT
Start: 2021-06-21 | End: 2021-06-21 | Stop reason: HOSPADM

## 2021-06-21 RX ORDER — SODIUM CHLORIDE 0.9 % (FLUSH) 0.9 %
5-40 SYRINGE (ML) INJECTION EVERY 12 HOURS SCHEDULED
Status: DISCONTINUED | OUTPATIENT
Start: 2021-06-21 | End: 2021-06-21 | Stop reason: HOSPADM

## 2021-06-21 RX ORDER — SODIUM CHLORIDE 9 MG/ML
INJECTION, SOLUTION INTRAVENOUS CONTINUOUS
Status: DISCONTINUED | OUTPATIENT
Start: 2021-06-21 | End: 2021-06-22 | Stop reason: HOSPADM

## 2021-06-21 RX ORDER — DIPHENHYDRAMINE HYDROCHLORIDE 50 MG/ML
12.5 INJECTION INTRAMUSCULAR; INTRAVENOUS
Status: DISCONTINUED | OUTPATIENT
Start: 2021-06-21 | End: 2021-06-21 | Stop reason: HOSPADM

## 2021-06-21 RX ORDER — METOCLOPRAMIDE HYDROCHLORIDE 5 MG/ML
10 INJECTION INTRAMUSCULAR; INTRAVENOUS ONCE
Status: COMPLETED | OUTPATIENT
Start: 2021-06-21 | End: 2021-06-21

## 2021-06-21 RX ORDER — AMLODIPINE BESYLATE 10 MG/1
10 TABLET ORAL DAILY
Status: DISCONTINUED | OUTPATIENT
Start: 2021-06-21 | End: 2021-06-22 | Stop reason: HOSPADM

## 2021-06-21 RX ORDER — HYDROMORPHONE HYDROCHLORIDE 1 MG/ML
0.25 INJECTION, SOLUTION INTRAMUSCULAR; INTRAVENOUS; SUBCUTANEOUS EVERY 5 MIN PRN
Status: DISCONTINUED | OUTPATIENT
Start: 2021-06-21 | End: 2021-06-21 | Stop reason: HOSPADM

## 2021-06-21 RX ORDER — MORPHINE SULFATE 4 MG/ML
4 INJECTION, SOLUTION INTRAMUSCULAR; INTRAVENOUS EVERY 5 MIN PRN
Status: DISCONTINUED | OUTPATIENT
Start: 2021-06-21 | End: 2021-06-21 | Stop reason: HOSPADM

## 2021-06-21 RX ORDER — FENTANYL CITRATE 50 UG/ML
INJECTION, SOLUTION INTRAMUSCULAR; INTRAVENOUS PRN
Status: DISCONTINUED | OUTPATIENT
Start: 2021-06-21 | End: 2021-06-21 | Stop reason: SDUPTHER

## 2021-06-21 RX ORDER — MIDAZOLAM HYDROCHLORIDE 1 MG/ML
2 INJECTION INTRAMUSCULAR; INTRAVENOUS
Status: DISCONTINUED | OUTPATIENT
Start: 2021-06-21 | End: 2021-06-21 | Stop reason: HOSPADM

## 2021-06-21 RX ORDER — PROMETHAZINE HYDROCHLORIDE 25 MG/ML
6.25 INJECTION, SOLUTION INTRAMUSCULAR; INTRAVENOUS
Status: DISCONTINUED | OUTPATIENT
Start: 2021-06-21 | End: 2021-06-21 | Stop reason: HOSPADM

## 2021-06-21 RX ORDER — LIDOCAINE HYDROCHLORIDE 10 MG/ML
1 INJECTION, SOLUTION EPIDURAL; INFILTRATION; INTRACAUDAL; PERINEURAL
Status: DISCONTINUED | OUTPATIENT
Start: 2021-06-21 | End: 2021-06-21 | Stop reason: HOSPADM

## 2021-06-21 RX ORDER — SODIUM CHLORIDE 0.9 % (FLUSH) 0.9 %
5-40 SYRINGE (ML) INJECTION EVERY 12 HOURS SCHEDULED
Status: DISCONTINUED | OUTPATIENT
Start: 2021-06-21 | End: 2021-06-22 | Stop reason: HOSPADM

## 2021-06-21 RX ADMIN — PHENYLEPHRINE HYDROCHLORIDE 200 MCG: 10 INJECTION INTRAVENOUS at 15:35

## 2021-06-21 RX ADMIN — FAMOTIDINE 20 MG: 10 INJECTION, SOLUTION INTRAVENOUS at 08:38

## 2021-06-21 RX ADMIN — ROCURONIUM BROMIDE 10 MG: 10 INJECTION, SOLUTION INTRAVENOUS at 15:50

## 2021-06-21 RX ADMIN — FENTANYL CITRATE 50 MCG: 50 INJECTION, SOLUTION INTRAMUSCULAR; INTRAVENOUS at 15:18

## 2021-06-21 RX ADMIN — METOCLOPRAMIDE 10 MG: 5 INJECTION, SOLUTION INTRAMUSCULAR; INTRAVENOUS at 15:03

## 2021-06-21 RX ADMIN — LIDOCAINE HYDROCHLORIDE 50 MG: 10 INJECTION, SOLUTION EPIDURAL; INFILTRATION; INTRACAUDAL; PERINEURAL at 15:17

## 2021-06-21 RX ADMIN — FAMOTIDINE 20 MG: 10 INJECTION, SOLUTION INTRAVENOUS at 20:23

## 2021-06-21 RX ADMIN — SODIUM CHLORIDE, SODIUM LACTATE, POTASSIUM CHLORIDE, AND CALCIUM CHLORIDE: 600; 310; 30; 20 INJECTION, SOLUTION INTRAVENOUS at 15:12

## 2021-06-21 RX ADMIN — SUCCINYLCHOLINE CHLORIDE 100 MG: 20 INJECTION, SOLUTION INTRAMUSCULAR; INTRAVENOUS at 15:19

## 2021-06-21 RX ADMIN — SODIUM CHLORIDE, POTASSIUM CHLORIDE, SODIUM LACTATE AND CALCIUM CHLORIDE 1000 ML: 600; 310; 30; 20 INJECTION, SOLUTION INTRAVENOUS at 05:03

## 2021-06-21 RX ADMIN — CIPROFLOXACIN 400 MG: 2 INJECTION, SOLUTION INTRAVENOUS at 12:36

## 2021-06-21 RX ADMIN — MORPHINE SULFATE 4 MG: 4 INJECTION, SOLUTION INTRAMUSCULAR; INTRAVENOUS at 05:20

## 2021-06-21 RX ADMIN — FENTANYL CITRATE 50 MCG: 50 INJECTION, SOLUTION INTRAMUSCULAR; INTRAVENOUS at 15:54

## 2021-06-21 RX ADMIN — HYDROMORPHONE HYDROCHLORIDE 0.25 MG: 1 INJECTION, SOLUTION INTRAMUSCULAR; INTRAVENOUS; SUBCUTANEOUS at 16:54

## 2021-06-21 RX ADMIN — ONDANSETRON 4 MG: 2 INJECTION INTRAMUSCULAR; INTRAVENOUS at 05:20

## 2021-06-21 RX ADMIN — PHENYLEPHRINE HYDROCHLORIDE 100 MCG: 10 INJECTION INTRAVENOUS at 15:29

## 2021-06-21 RX ADMIN — PHENYLEPHRINE HYDROCHLORIDE 200 MCG: 10 INJECTION INTRAVENOUS at 16:19

## 2021-06-21 RX ADMIN — HYDROMORPHONE HYDROCHLORIDE 1 MG: 1 INJECTION, SOLUTION INTRAMUSCULAR; INTRAVENOUS; SUBCUTANEOUS at 10:54

## 2021-06-21 RX ADMIN — SUGAMMADEX 200 MG: 100 INJECTION, SOLUTION INTRAVENOUS at 16:27

## 2021-06-21 RX ADMIN — HYDROMORPHONE HYDROCHLORIDE 1 MG: 1 INJECTION, SOLUTION INTRAMUSCULAR; INTRAVENOUS; SUBCUTANEOUS at 22:37

## 2021-06-21 RX ADMIN — PHENYLEPHRINE HYDROCHLORIDE 200 MCG: 10 INJECTION INTRAVENOUS at 15:56

## 2021-06-21 RX ADMIN — SODIUM CHLORIDE: 9 INJECTION, SOLUTION INTRAVENOUS at 20:23

## 2021-06-21 RX ADMIN — HYDROMORPHONE HYDROCHLORIDE 1 MG: 1 INJECTION, SOLUTION INTRAMUSCULAR; INTRAVENOUS; SUBCUTANEOUS at 07:29

## 2021-06-21 RX ADMIN — ATORVASTATIN CALCIUM 20 MG: 40 TABLET, FILM COATED ORAL at 08:38

## 2021-06-21 RX ADMIN — SODIUM CHLORIDE 500 ML: 9 INJECTION, SOLUTION INTRAVENOUS at 08:41

## 2021-06-21 RX ADMIN — GEMFIBROZIL 600 MG: 600 TABLET ORAL at 08:38

## 2021-06-21 RX ADMIN — EPHEDRINE SULFATE 10 MG: 50 INJECTION INTRAMUSCULAR; INTRAVENOUS; SUBCUTANEOUS at 15:35

## 2021-06-21 RX ADMIN — PROPOFOL 120 MG: 10 INJECTION, EMULSION INTRAVENOUS at 15:18

## 2021-06-21 RX ADMIN — PHENYLEPHRINE HYDROCHLORIDE 150 MCG: 10 INJECTION INTRAVENOUS at 15:32

## 2021-06-21 RX ADMIN — METRONIDAZOLE 500 MG: 500 INJECTION, SOLUTION INTRAVENOUS at 12:36

## 2021-06-21 RX ADMIN — FENTANYL CITRATE 50 MCG: 50 INJECTION, SOLUTION INTRAMUSCULAR; INTRAVENOUS at 15:47

## 2021-06-21 RX ADMIN — SODIUM CHLORIDE: 9 INJECTION, SOLUTION INTRAVENOUS at 12:35

## 2021-06-21 RX ADMIN — FAMOTIDINE 20 MG: 10 INJECTION, SOLUTION INTRAVENOUS at 15:02

## 2021-06-21 RX ADMIN — AMLODIPINE BESYLATE 10 MG: 10 TABLET ORAL at 08:38

## 2021-06-21 RX ADMIN — MIDAZOLAM 2 MG: 1 INJECTION INTRAMUSCULAR; INTRAVENOUS at 15:12

## 2021-06-21 RX ADMIN — METOCLOPRAMIDE 10 MG: 5 INJECTION, SOLUTION INTRAMUSCULAR; INTRAVENOUS at 05:20

## 2021-06-21 RX ADMIN — SODIUM CHLORIDE, POTASSIUM CHLORIDE, SODIUM LACTATE AND CALCIUM CHLORIDE: 600; 310; 30; 20 INJECTION, SOLUTION INTRAVENOUS at 15:03

## 2021-06-21 RX ADMIN — METRONIDAZOLE 500 MG: 500 INJECTION, SOLUTION INTRAVENOUS at 20:23

## 2021-06-21 RX ADMIN — ROCURONIUM BROMIDE 10 MG: 10 INJECTION, SOLUTION INTRAVENOUS at 15:32

## 2021-06-21 RX ADMIN — ONDANSETRON HYDROCHLORIDE 4 MG: 2 SOLUTION INTRAMUSCULAR; INTRAVENOUS at 15:03

## 2021-06-21 RX ADMIN — DEXAMETHASONE SODIUM PHOSPHATE 10 MG: 10 INJECTION, SOLUTION INTRAMUSCULAR; INTRAVENOUS at 15:28

## 2021-06-21 RX ADMIN — ROCURONIUM BROMIDE 30 MG: 10 INJECTION, SOLUTION INTRAVENOUS at 15:29

## 2021-06-21 ASSESSMENT — ENCOUNTER SYMPTOMS
DIARRHEA: 0
EYE REDNESS: 0
EYES NEGATIVE: 1
VOICE CHANGE: 0
SHORTNESS OF BREATH: 0
RESPIRATORY NEGATIVE: 1
VOMITING: 1
COUGH: 0
CONSTIPATION: 0
BACK PAIN: 0
SHORTNESS OF BREATH: 0
NAUSEA: 1
EYE PAIN: 0
ABDOMINAL PAIN: 1
ABDOMINAL DISTENTION: 1
SORE THROAT: 0
RHINORRHEA: 0
ABDOMINAL DISTENTION: 0
ALLERGIC/IMMUNOLOGIC NEGATIVE: 1

## 2021-06-21 ASSESSMENT — PAIN DESCRIPTION - LOCATION
LOCATION: ABDOMEN

## 2021-06-21 ASSESSMENT — PAIN SCALES - GENERAL
PAINLEVEL_OUTOF10: 10
PAINLEVEL_OUTOF10: 6
PAINLEVEL_OUTOF10: 6
PAINLEVEL_OUTOF10: 10
PAINLEVEL_OUTOF10: 4
PAINLEVEL_OUTOF10: 5
PAINLEVEL_OUTOF10: 8
PAINLEVEL_OUTOF10: 7

## 2021-06-21 ASSESSMENT — PAIN - FUNCTIONAL ASSESSMENT: PAIN_FUNCTIONAL_ASSESSMENT: ACTIVITIES ARE NOT PREVENTED

## 2021-06-21 ASSESSMENT — PAIN DESCRIPTION - ONSET: ONSET: GRADUAL

## 2021-06-21 ASSESSMENT — PAIN DESCRIPTION - FREQUENCY: FREQUENCY: INTERMITTENT

## 2021-06-21 ASSESSMENT — LIFESTYLE VARIABLES: SMOKING_STATUS: 0

## 2021-06-21 ASSESSMENT — PAIN DESCRIPTION - PAIN TYPE
TYPE: SURGICAL PAIN

## 2021-06-21 ASSESSMENT — PAIN DESCRIPTION - PROGRESSION: CLINICAL_PROGRESSION: GRADUALLY WORSENING

## 2021-06-21 ASSESSMENT — PAIN DESCRIPTION - ORIENTATION: ORIENTATION: RIGHT;MID

## 2021-06-21 ASSESSMENT — PAIN DESCRIPTION - DESCRIPTORS: DESCRIPTORS: SORE

## 2021-06-21 NOTE — ANESTHESIA PRE PROCEDURE
Department of Anesthesiology  Preprocedure Note       Name:  Audrey Daley   Age:  46 y.o.  :  1970                                          MRN:  [id number]         Date:  2021      Surgeon: Rodrigo Miller):  Betsy Gagnon MD    Procedure: Procedure(s):  CHOLECYSTECTOMY LAPAROSCOPIC    Medications prior to admission:   Prior to Admission medications    Medication Sig Start Date End Date Taking?  Authorizing Provider   dicyclomine (BENTYL) 10 MG capsule Take 1 capsule by mouth 4 times daily (before meals and nightly) 21  Yes Sabi Gramajo MD   ondansetron (ZOFRAN ODT) 4 MG disintegrating tablet Take 1 tablet by mouth every 8 hours as needed for Nausea or Vomiting 21  Yes Sabi Gramajo MD   pantoprazole (PROTONIX) 20 MG tablet Take 1 tablet by mouth daily 20  Yes MELLY Asif   atorvastatin (LIPITOR) 40 MG tablet Take 40 mg by mouth 1/15/19  Yes Historical Provider, MD   amLODIPine (NORVASC) 10 MG tablet Take 10 mg by mouth 1/15/19  Yes Historical Provider, MD       Current medications:    Current Facility-Administered Medications   Medication Dose Route Frequency Provider Last Rate Last Admin    [MAR Hold] amLODIPine (NORVASC) tablet 10 mg  10 mg Oral Daily Rosario Maxwell MD   10 mg at 21 0838    [MAR Hold] famotidine (PEPCID) injection 20 mg  20 mg Intravenous BID Rosario Maxwell MD   20 mg at 21 0838    [MAR Hold] HYDROmorphone HCl PF (DILAUDID) injection 1 mg  1 mg Intravenous Q4H PRN Rosario Maxwell MD   1 mg at 21 1054    [MAR Hold] ondansetron (ZOFRAN) injection 4 mg  4 mg Intravenous Q6H PRN Rosario Maxwell MD        [facility] Hold] nicotine (NICODERM CQ) 21 MG/24HR 1 patch  1 patch Transdermal Daily Rosario Maxwell MD   1 patch at 21 0837    [MAR Hold] atorvastatin (LIPITOR) tablet 20 mg  20 mg Oral Daily Rosario Maxwell MD   20 mg at 21 0838    [MAR Hold] gemfibrozil (LOPID) tablet 600 mg  600 mg Oral BID Meme Eid MD   600 mg at 21 0838    [MAR Hold] HYDROmorphone HCl PF (DILAUDID) injection 1 mg  1 mg Intravenous Once Will MD Corry        [facility] Hold] ciprofloxacin (CIPRO) IVPB 400 mg  400 mg Intravenous Q12H Raz Oliver MD   Stopped at 21 1301    [MAR Hold] metronidazole (FLAGYL) 500 mg in NaCl 100 mL IVPB premix  500 mg Intravenous Q8H Raz Oliver  mL/hr at 21 1236 500 mg at 21 1236    [MAR Hold] 0.9 % sodium chloride infusion   Intravenous Continuous Raz Oliver  mL/hr at 21 1235 New Bag at 21 1235    lactated ringers infusion   Intravenous Continuous Blanca Cordero MD           Allergies: Allergies   Allergen Reactions    Pcn [Penicillins]        Problem List:    Patient Active Problem List   Diagnosis Code    GERD (gastroesophageal reflux disease) K21.9    Hyperlipidemia E78.5    Asthma J45.909    Atherosclerosis of native artery of extremity with intermittent claudication (HCC) I70.219    Abdominal pain R10.9    Hypertension I10    Nausea R11.0    Dehydration E86.0    Hypertriglyceridemia E78.1    Obesity (BMI 30-39. 9) E66.9    Tobacco abuse Z72.0    Alcohol abuse, episodic drinking behavior F10.10    Elevated lipase R74.8    Abdominal pain, epigastric R10.13       Past Medical History:        Diagnosis Date    Alcohol abuse, episodic drinking behavior 2021    Asthma     Atherosclerosis of native arteries of the extremities with intermittent claudication 2013    GERD (gastroesophageal reflux disease)     HTN (hypertension)     Hyperlipidemia     Hypertension 2021    Obesity (BMI 30-39.9) 2021       Past Surgical History:        Procedure Laterality Date     SECTION         Social History:    Social History     Tobacco Use    Smoking status: Current Every Day Smoker     Packs/day: 1.50     Years: 30.00     Pack years: 45.00    Smokeless tobacco: Never Used APTT    HCG (If Applicable): No results found for: PREGTESTUR, PREGSERUM, HCG, HCGQUANT     ABGs: No results found for: PHART, PO2ART, YMM5KUK, TJL3TMW, BEART, N5HWKKDO     Type & Screen (If Applicable):  No results found for: LABABO, LABRH    Drug/Infectious Status (If Applicable):  No results found for: HIV, HEPCAB    COVID-19 Screening (If Applicable):   Lab Results   Component Value Date    COVID19 Not Detected 2021           Anesthesia Evaluation  Patient summary reviewed and Nursing notes reviewed no history of anesthetic complications:   Airway: Mallampati: IV  TM distance: >3 FB   Neck ROM: full  Mouth opening: > = 3 FB Dental: normal exam         Pulmonary:   (+) asthma:     (-) shortness of breath, sleep apnea and not a current smoker                           Cardiovascular:  Exercise tolerance: good (>4 METS),   (+) hypertension:, hyperlipidemia    (-) pacemaker, past MI, CAD, CABG/stent, dysrhythmias and  angina    ECG reviewed               Beta Blocker:  Not on Beta Blocker      ROS comment: EK BPM  Sinus rhythm  Comparison Summary: No serial comparison made  Summary: Normal ECG     Neuro/Psych:   (+) psychiatric history:   (-) seizures and CVA           GI/Hepatic/Renal:   (+) GERD: poorly controlled,      (-) liver disease and no renal disease       Endo/Other:    (+) no malignancy/cancer. (-) diabetes mellitus, blood dyscrasia, no malignancy/cancer               Abdominal:       Abdomen: tender. Vascular:     - DVT and PE. Anesthesia Plan      general     ASA 2     (Pepcid, reglan, and zofran in preop.)  Induction: intravenous. BIS  MIPS: Postoperative opioids intended and Prophylactic antiemetics administered. Anesthetic plan and risks discussed with patient.                       Kim Tapia DO   2021

## 2021-06-21 NOTE — ED NOTES
RN has explained Narcotic Policy to patient. Patient understands that they are not allowed to operate a motor vehicle for a minimum of 4 hours after administration. Patient is aware and understands that law enforcement will be contacted if the patient attempts to operate a motor vehicle prior to the stated 4 hours.      Pt riding home with friend     Heike Torres RN  06/20/21 2037

## 2021-06-21 NOTE — PLAN OF CARE
Patient admitted this a.m. Writer assumes care of patient this AM.  Patient seen and examined. Doing well. Abdominal pain improved/controlled. Laying comfortably in bed in no apparent acute distress. Continue current management  General surgery consulted.

## 2021-06-21 NOTE — CONSULTS
Ms. Jarad Terrell is a 46year old female who presented to the ER with a complaint of abdominal pain. She reports that she started having abdominal pain yesterday around 5 pm. She has had episodes of similar pain in the past, but not this severe. She had imaging at [facility] 2020 showing gallstones, but had not followed up as an outpatient with general surgery. She states that the pain is all over her abdomen, but mostly in the epigastric and RUQ area. The pain is described as sharp and intermittent. It seemed to start after she ate a couple hot dogs yesterday. She came in to the ER, where she did not have an elevated WBC, and was instructed to follow up in general surgery as an outpatient. She then returned later in the night with worsening pain after taking pain medication that did not improve her pain. She denies any diarrhea, fever, or chills. An ultrasound was done in the ER showing a gallstone in the neck and trace pericholecystic fluid. Her WBC was elevated.     Past Medical History:   Diagnosis Date    Alcohol abuse, episodic drinking behavior 2021    Asthma     Atherosclerosis of native arteries of the extremities with intermittent claudication 2013    GERD (gastroesophageal reflux disease)     HTN (hypertension)     Hyperlipidemia     Hypertension 2021    Obesity (BMI 30-39.9) 2021     Past Surgical History:   Procedure Laterality Date     SECTION       Current Facility-Administered Medications   Medication Dose Route Frequency Provider Last Rate Last Admin    amLODIPine (NORVASC) tablet 10 mg  10 mg Oral Daily Marysol Hernandez MD   10 mg at 21 0838    famotidine (PEPCID) injection 20 mg  20 mg Intravenous BID Marysol Hernandez MD   20 mg at 21 0838    HYDROmorphone HCl PF (DILAUDID) injection 1 mg  1 mg Intravenous Q4H PRN Marysol Hernandez MD   1 mg at 21 1054    ondansetron (ZOFRAN) injection 4 mg  4 mg Intravenous Q6H PRN Josue CORMIER frequency and hematuria. Musculoskeletal: Negative for arthralgias, back pain and gait problem. Skin: Negative for rash and wound. Neurological: Negative for dizziness, seizures and headaches. Psychiatric/Behavioral: Negative for confusion and dysphoric mood. Physical Exam  Vitals reviewed. Constitutional:       General: She is sleeping. She is not in acute distress. HENT:      Head: Normocephalic and atraumatic. Eyes:      General: No scleral icterus. Pupils: Pupils are equal, round, and reactive to light. Cardiovascular:      Rate and Rhythm: Normal rate and regular rhythm. Pulmonary:      Effort: Pulmonary effort is normal. No respiratory distress. Abdominal:      General: There is no distension. Palpations: Abdomen is soft. There is no mass. Tenderness: There is abdominal tenderness (epigastric and RUQ). There is no guarding. Hernia: No hernia is present. Musculoskeletal:         General: No swelling or deformity. Normal range of motion. Cervical back: Neck supple. No rigidity. Skin:     General: Skin is warm and dry. Neurological:      General: No focal deficit present. Mental Status: Mental status is at baseline. She is lethargic. Psychiatric:         Mood and Affect: Mood normal.         Behavior: Behavior normal.       Impression:     Cholelithiasis and probable cholecystitis.    Signed by Dr Sohail Light         CBC:   Lab Results   Component Value Date    WBC 15.6 06/21/2021    RBC 5.24 06/21/2021    HGB 14.9 06/21/2021    HCT 44.8 06/21/2021    MCV 85.5 06/21/2021    MCH 28.4 06/21/2021    MCHC 33.3 06/21/2021    RDW 15.9 06/21/2021     06/21/2021    MPV 10.2 06/21/2021     CMP:    Lab Results   Component Value Date     06/21/2021    K 4.4 06/21/2021    K 4.2 11/24/2020     06/21/2021    CO2 23 06/21/2021    BUN 16 06/21/2021    CREATININE 1.1 06/21/2021    GFRAA >59 06/21/2021    LABGLOM 52 06/21/2021    GLUCOSE 125 06/21/2021    PROT 7.8 06/21/2021    LABALBU 4.0 06/21/2021    CALCIUM 9.6 06/21/2021    BILITOT <0.2 06/21/2021    ALKPHOS 101 06/21/2021    AST 21 06/21/2021    ALT 20 06/21/2021       Assessment and plan:  46year old female with calculous cholecystitis  The risks and benefits of laparoscopic cholecystectomy were discussed with the patient, including but not limited to, bleeding, infection, injury to surrounding structures, and need for open surgery. The patient expressed understanding and is okay with proceeding with surgery. She is already NPO, I previously ordered IV antibiotics and IVF, and her covid test was negative.      Marilee Chauhan MD  6/21/2021  12:56 PM

## 2021-06-21 NOTE — BRIEF OP NOTE
Brief Postoperative Note      Patient: Nba Parish  YOB: 1970  MRN: [id number]    Date of Procedure: 6/21/2021    Pre-Op Diagnosis: acute calculous cholecystitis    Post-Op Diagnosis: Same       Procedure(s):  CHOLECYSTECTOMY LAPAROSCOPIC    Surgeon(s):  Patsy Fuentes MD    Assistant:  * No surgical staff found *    Anesthesia: General    Estimated Blood Loss (mL): Minimal    Complications: None    Specimens:   ID Type Source Tests Collected by Time Destination   A : gallbladder Tissue Gallbladder SURGICAL PATHOLOGY Patsy Fuentes MD 6/21/2021 1500        Implants:  * No implants in log *      Drains: * No LDAs found *    Findings: edema and thickening of the wall, aspirated to decrease pressure on wall.     Electronically signed by Patsy Fuentes MD on 6/21/2021 at 4:32 PM

## 2021-06-21 NOTE — ED PROVIDER NOTES
[facility] EMERGENCY DEPT  EMERGENCY DEPARTMENT ENCOUNTER      Pt Name: Julito Senior  MRN: [id number]  Yadiragffederica 1970  Date of evaluation: 6/21/2021  Provider: Brianda Zhang MD    CHIEF COMPLAINT       Chief Complaint   Patient presents with    Abdominal Pain         HISTORY OF PRESENT ILLNESS   (Location/Symptom, Timing/Onset,Context/Setting, Quality, Duration, Modifying Factors, Severity)  Note limiting factors. Julito Senior is a 46 y.o. female who presents to the emergency department with complaint of abdominal pain associated with nausea and vomiting. Patient was seen here last night for similar complaints. Work-up at that time including labs and CT of the abdomen pelvis were unremarkable. Patient states she went home and took the medication that she was prescribed but had significant worsening of the pain associated with nausea and several episodes of vomiting which prompted her to return. Patient states she was told in the past that she had gallstones and was advised to follow-up with general surgery but never did. On review of medical records, I do not see where patient was found to have gallstones on work-up. She denies any fevers. Patient does report she drank alcohol Friday and Saturday but did not notice any specific triggers or proceeding factors to the onset of the pain or other symptoms. Pain is located in the epigastric region primarily but with generalized abdominal pain as well. HPI    NursingNotes were reviewed. REVIEW OF SYSTEMS    (2-9 systems for level 4, 10 or more for level 5)     Review of Systems   Constitutional: Negative for fatigue and fever. HENT: Negative for congestion, rhinorrhea and voice change. Eyes: Negative for pain and redness. Respiratory: Negative for cough and shortness of breath. Cardiovascular: Negative for chest pain. Gastrointestinal: Positive for abdominal pain, nausea and vomiting. Negative for diarrhea. Endocrine: Negative. Genitourinary: Negative. Musculoskeletal: Negative for arthralgias and gait problem. Skin: Negative for rash and wound. Neurological: Negative for weakness and headaches. Hematological: Negative. Psychiatric/Behavioral: Negative. All other systems reviewed and are negative. A complete review of systems was performed and is negative except as noted above in the HPI.        PAST MEDICAL HISTORY     Past Medical History:   Diagnosis Date    Alcohol abuse, episodic drinking behavior 2021    Asthma     Atherosclerosis of native arteries of the extremities with intermittent claudication 2013    GERD (gastroesophageal reflux disease)     HTN (hypertension)     Hyperlipidemia     Hypertension 2021    Obesity (BMI 30-39.9) 2021         SURGICAL HISTORY       Past Surgical History:   Procedure Laterality Date     SECTION           CURRENT MEDICATIONS       Previous Medications    AMLODIPINE (NORVASC) 10 MG TABLET    Take 10 mg by mouth    ATORVASTATIN (LIPITOR) 40 MG TABLET    Take 40 mg by mouth    DICYCLOMINE (BENTYL) 10 MG CAPSULE    Take 1 capsule by mouth 4 times daily (before meals and nightly)    ONDANSETRON (ZOFRAN ODT) 4 MG DISINTEGRATING TABLET    Take 1 tablet by mouth every 8 hours as needed for Nausea or Vomiting    PANTOPRAZOLE (PROTONIX) 20 MG TABLET    Take 1 tablet by mouth daily       ALLERGIES     Pcn [penicillins]    FAMILY HISTORY       Family History   Problem Relation Age of Onset    Cancer Maternal Aunt     Diabetes Maternal Uncle     Diabetes Maternal Grandmother     Cancer Maternal Grandfather           SOCIAL HISTORY       Social History     Socioeconomic History    Marital status: Legally      Spouse name: None    Number of children: None    Years of education: None    Highest education level: None   Occupational History    None   Tobacco Use    Smoking status: Current Every Day Smoker     Packs/day: 1.50     Years: 30.00 scleral icterus. Pupils: Pupils are equal, round, and reactive to light. Neck:      Trachea: No tracheal deviation. Cardiovascular:      Rate and Rhythm: Normal rate. Pulses: Normal pulses. Heart sounds: Normal heart sounds. Pulmonary:      Effort: Pulmonary effort is normal.      Breath sounds: Normal breath sounds. No stridor. No wheezing or rhonchi. Abdominal:      General: There is no distension. Palpations: Abdomen is soft. Abdomen is not rigid. Tenderness: There is generalized abdominal tenderness and tenderness in the epigastric area. There is no guarding. Hernia: No hernia is present. Musculoskeletal:         General: No deformity. Cervical back: Normal range of motion. Skin:     General: Skin is warm and dry. Findings: No rash. Neurological:      Mental Status: She is alert and oriented to person, place, and time. Cranial Nerves: No cranial nerve deficit. Coordination: Coordination normal.   Psychiatric:         Behavior: Behavior normal.         DIAGNOSTIC RESULTS     EKG: All EKG's are interpreted by the Emergency Department Physician who either signs or Co-signs this chart in the absence of a cardiologist.      RADIOLOGY:   Non-plain film images such as CT, Ultrasound and MRI are read by the radiologist. Plainradiographic images are visualized and preliminarily interpreted by the emergency physician with the below findings:      Interpretation per the Radiologist below, if available at the time of this note:    [address]   Final Result   Cholelithiasis and probable cholecystitis.    Signed by Dr Joaquin Mitchell            ED BEDSIDE ULTRASOUND:   Performed by ED Physician - none    LABS:  Labs Reviewed   CBC WITH AUTO DIFFERENTIAL - Abnormal; Notable for the following components:       Result Value    WBC 15.6 (*)     RDW 15.9 (*)     Neutrophils % 76.6 (*)     Lymphocytes % 15.4 (*)     Neutrophils Absolute 11.9 (*)     All other of Complications, Morbidity, and/or Mortality  Presenting problems: high  Diagnostic procedures: low  Management options: moderate    Patient Progress  Patient progress: stable      ED Course as of Jun 21 0731   Mon Jun 21, 2021   0507 Lipase(!): 141 [LATASHA]   0508 WBC(!): 15.6 [LATASHA]      ED Course User Index  [LATASHA] Kurt Marshall MD     Patient appears uncomfortable here. On laboratory evaluation, white blood cell count has increased significantly and lipase has gone from 35 to 141 over just a few hours time. Given these findings, for admission for further work-up and treatment of suspected early acute pancreatitis. Could be gallstone pancreatitis given patient's report of prior cholelithiasis diagnosis, but also seems likely to be alcohol induced. Lipid panel was sent which shows mild hypertriglyceridemia, likely not high enough to be the cause of presentation. Based on the evaluation and work-up here patient is felt to require further monitoring, work-up, or treatment that is available in the emergency department. Case was discussed with hospitalist who agrees for observation or admission for further management. Treatment and stabilization as necessary were provided in the emergency department prior to transfer of care to the medicine service. CONSULTS:  IP CONSULT TO GI    PROCEDURES:  Unless otherwise notedbelow, none     Procedures      FINAL IMPRESSION     1. Abdominal pain, epigastric    2. Non-intractable vomiting with nausea, unspecified vomiting type    3. Acute pancreatitis without infection or necrosis, unspecified pancreatitis type          DISPOSITION/PLAN   DISPOSITION Admitted 06/21/2021 06:15:31 AM      PATIENT REFERRED TO:  No follow-up provider specified.     DISCHARGE MEDICATIONS:  New Prescriptions    No medications on file          (Please note that portions of this note were completed with a voice recognition program.  Efforts were made to edit the dictations butoccasionally

## 2021-06-21 NOTE — H&P
HISTORY AND PHYSICAL             Date: 6/21/2021        Patient Name: Samantha Cameron     YOB: 1970      Age:  46 y.o. Chief Complaint     Chief Complaint   Patient presents with    Abdominal Pain    patient was seen here on late evening of 6/20/21 and was discharged home   Reason for evaluation :  Abdominal pain diffuse mid abdominal pain with swelling to abdomen perceived by patient. She went home and took meds, that were given to her and still continued to have pain and increased and as she states, \"she could no longer take the pain\" and has returned for evaluation to ER. History Obtained From   Patient and ER chart. History of Present Illness   Pt is being admitted for evaluation for recurrent abdominal pain. She was seen twice in er in less than 24 hour time frame. She has had abdominal cramps and waves of pain, and nausea, and vomiting. On review of her chart, she has no gallstones. It was suggested to her, when this pain occurred previously to follow up with a surgeon for evaluation for possible gallstones. She chose not to do that as she was improved and felt better. This weekend. She did not eat out. She was not exposed to any sick contacts. No travel anywhere. However, she did drink to an excess all weekend long and thus Sunday after noon pain diffusely started with abdominal cramps and nausea. .  Mild intermittent vomitus     Then seen in er   Evaluated   Discharged home   Ct scan of abdomen and pelvis no pathology and no gallstones noted. She was discharged and came back in and evaluated     Lipase went from 35 to 140 or so   Wbc ct elevated to 15.6  No uti   No lung pathology    She is lying in bed curled up in fetal position holding abdomen and states, it is painful. It is cramping. As I review situation she states, \"well am I sick and this is a dangerous situation, correct? \"  I told her, unfortunately, assessing a medical dx in terms of level of being \"dangerous\" is a subjective evaluation. Objective evaluation for patient. :    She is mildly dehydrated but is able to intake fluids and solid food . However, will keep her npo for now    Mild elevation in lipase and moderate change from her baseline. This to me, still does not define pancreatitis. However, patient insists on being admitted and evaluated. Wbc was elevated previously and now and could be consistent with stress demargination from vomiting and acute situation and again, no source of infection to explain wbc count is noted. Ct scan of abdomen and pelvis done on  was not revealing for pancreatitis or inflammation and edema around pancreas, and no gallstones were noted. I have requested ER doctor to please order a gallbladder and liver ultrasound. ( I see no elevation of liver tests, no elevation of bilirubin). No platelet abnormalities. However, what is interesting is her lipid panel reveals a total cholesterol fo 323  ldl of 212  hdl of 30  Triglycerides of 568    Certainly, this could be to contributing to pancreatic irritation and pancreatitis   In addition to excess abuse of etoh this weekend. Past Medical History     Past Medical History:   Diagnosis Date    Alcohol abuse, episodic drinking behavior 2021    Asthma     Atherosclerosis of native arteries of the extremities with intermittent claudication 2013    GERD (gastroesophageal reflux disease)     HTN (hypertension)     Hyperlipidemia     Hypertension 2021    Obesity (BMI 30-39.9) 2021        Past Surgical History     Past Surgical History:   Procedure Laterality Date     SECTION          Medications Prior to Admission     Prior to Admission medications    Medication Sig Start Date End Date Taking?  Authorizing Provider   dicyclomine (BENTYL) 10 MG capsule Take 1 capsule by mouth 4 times daily (before meals and nightly) 21  Yes Jackie Ballard MD   ondansetron [facility] ODT) 4 MG disintegrating tablet Take 1 tablet by mouth every 8 hours as needed for Nausea or Vomiting 6/20/21  Yes Minh Dunn MD   pantoprazole (PROTONIX) 20 MG tablet Take 1 tablet by mouth daily 11/24/20  Yes MELLY Amaya   atorvastatin (LIPITOR) 40 MG tablet Take 40 mg by mouth 1/15/19  Yes Historical Provider, MD   amLODIPine (NORVASC) 10 MG tablet Take 10 mg by mouth 1/15/19  Yes Historical Provider, MD        Allergies   Pcn [penicillins]    Social History     Social History     Tobacco History     Smoking Status  Current Every Day Smoker Smoking Frequency  1.5 packs/day for 30 years (45 pk yrs)    Smokeless Tobacco Use  Never Used          Alcohol History     Alcohol Use Status  Yes Drinks/Week  3 Shots of liquor per week Amount  3.0 standard drinks of alcohol/wk Comment  occ          Drug Use     Drug Use Status  No          Sexual Activity     Sexually Active  Not Currently                Family History     Family History   Problem Relation Age of Onset    Cancer Maternal Aunt     Diabetes Maternal Uncle     Diabetes Maternal Grandmother     Cancer Maternal Grandfather        Review of Systems   Review of Systems   Constitutional: Positive for activity change and fatigue. HENT: Negative. Eyes: Negative. Respiratory: Negative. Cardiovascular: Positive for palpitations and leg swelling. Gastrointestinal: Positive for abdominal distention, abdominal pain, nausea and vomiting. Endocrine: Negative. Genitourinary: Negative. Musculoskeletal: Negative. Skin: Negative. Allergic/Immunologic: Negative. Neurological: Positive for dizziness, tremors and weakness. Hematological: Negative. Psychiatric/Behavioral: Negative. All other systems reviewed and are negative.       Physical Exam   BP (!) 156/102   Pulse 80   Temp 98 °F (36.7 °C)   Resp 20   Ht 5' 6\" (1.676 m)   Wt 206 lb (93.4 kg)   LMP 01/30/2019   SpO2 95%   BMI 33.25 kg/m²     Physical Exam  Vitals and 11.5 - 14.5 %    Platelets 960 156 - 729 K/uL    MPV 10.2 9.4 - 12.3 fL    Neutrophils % 76.6 (H) 50.0 - 65.0 %    Lymphocytes % 15.4 (L) 20.0 - 40.0 %    Monocytes % 5.9 0.0 - 10.0 %    Eosinophils % 1.2 0.0 - 5.0 %    Basophils % 0.6 0.0 - 1.0 %    Neutrophils Absolute 11.9 (H) 1.5 - 7.5 K/uL    Immature Granulocytes # 0.0 K/uL    Lymphocytes Absolute 2.4 1.1 - 4.5 K/uL    Monocytes Absolute 0.90 0.00 - 0.90 K/uL    Eosinophils Absolute 0.20 0.00 - 0.60 K/uL    Basophils Absolute 0.10 0.00 - 0.20 K/uL   Comprehensive Metabolic Panel    Collection Time: 06/21/21  3:45 AM   Result Value Ref Range    Sodium 137 136 - 145 mmol/L    Potassium 4.4 3.5 - 5.0 mmol/L    Chloride 104 98 - 111 mmol/L    CO2 23 22 - 29 mmol/L    Anion Gap 10 7 - 19 mmol/L    Glucose 125 (H) 74 - 109 mg/dL    BUN 16 6 - 20 mg/dL    CREATININE 1.1 (H) 0.5 - 0.9 mg/dL    GFR Non-African American 52 (A) >60    GFR African American >59 >59    Calcium 9.6 8.6 - 10.0 mg/dL    Total Protein 7.8 6.6 - 8.7 g/dL    Albumin 4.0 3.5 - 5.2 g/dL    Total Bilirubin <0.2 0.2 - 1.2 mg/dL    Alkaline Phosphatase 101 35 - 104 U/L    ALT 20 5 - 33 U/L    AST 21 5 - 32 U/L   Lipase    Collection Time: 06/21/21  3:45 AM   Result Value Ref Range    Lipase 141 (H) 13 - 60 U/L   LIPID PANEL    Collection Time: 06/21/21  3:48 AM   Result Value Ref Range    Cholesterol, Total 323 (H) 160 - 199 mg/dL    Triglycerides 324 (H) 0 - 149 mg/dL    HDL 46 (L) 65 - 121 mg/dL    LDL Calculated 212 <100 mg/dL   COVID-19, Rapid    Collection Time: 06/21/21  5:23 AM    Specimen: Nasopharyngeal Swab   Result Value Ref Range    SARS-CoV-2, NAAT Not Detected Not Detected        Imaging/Diagnostics Last 24 Hours   CT ABDOMEN PELVIS W IV CONTRAST Additional Contrast? None    Result Date: 6/20/2021  CT ABDOMEN PELVIS W IV CONTRAST 6/20/2021 7:37 PM HISTORY: Mid epigastric pain COMPARISON: CT scan dated 11/24/2020.  DLP: 1439 mGy cm TECHNIQUE: Following the uneventful administration of intravenous iodinated contrast, helical CT tomographic images of the abdomen and pelvis were acquired. Coronal reformatted images were also provided for review. Automated exposure control was also utilized to decrease patient radiation dose. FINDINGS: The lung bases and base of the heart are unremarkable. LIVER: No focal liver lesion. The hepatic vasculature is patent. BILIARY SYSTEM: The gallbladder is unremarkable. No intrahepatic or extrahepatic ductal dilatation. PANCREAS: No focal pancreatic lesion. SPLEEN: Unremarkable. KIDNEYS AND ADRENALS: Stable left adrenal nodule. There is a small left renal cortical cyst. Kidneys are otherwise unremarkable. The ureters are decompressed and normal in appearance. RETROPERITONEUM: No mass, lymphadenopathy or hemorrhage. GI TRACT: No evidence of obstruction or bowel wall thickening. The appendix is visualized and unremarkable. OTHER: There is no mesenteric mass, lymphadenopathy or fluid collection. Atheromatous vascular calcification. The abdominopelvic vasculature is patent. The osseous structures and soft tissues demonstrate no worrisome lesions. PELVIS: No mass lesion, fluid collection or significant lymphadenopathy is seen in the pelvis. The urinary bladder is normal in appearance. 1. No acute process in the abdomen or pelvis. Signed by Dr Edwardo Quarles    * (Principal) Abdominal pain 6/21/2021 Yes    GERD (gastroesophageal reflux disease) 6/21/2021 Yes    Hyperlipidemia 6/21/2021 Yes    Asthma 6/21/2021 Yes    Atherosclerosis of native artery of extremity with intermittent claudication ([address] 6/21/2021 Yes    Hypertension 6/21/2021 Yes    Nausea 6/21/2021 Yes    Dehydration 6/21/2021 Yes    Hypertriglyceridemia 6/21/2021 Yes    Obesity (BMI 30-39.9) 6/21/2021 Yes    Tobacco abuse 6/21/2021 Yes    Alcohol abuse, episodic drinking behavior 6/21/2021 Yes    Elevated lipase 6/21/2021 Yes          Plan   1. Patient is being admitted for recurrent abdominal pain  And etiology on extensive recurrent evaluation is unrevealing  There is no evidence of gallstones or common bile duct pathology noted on ct scan of abdomen. There is a slight increase in lipase   She has mild leukocytosis   She had some pain on palpation of abdominal area, and this is confusing with the (dramatic flare and overlay of \"this is very serious and could kill me right\"?). Given her level of mild pathology with no certain dx of pancreatitis etc, I highly doubt the above statement. 2.  Admit     3. Fluids     4. Pain control     5. Follow up on labs. 6.  I have no source of infection to start abx and cover for any pathogens, as I am not suspecting any. My thoughts are that this is alcohol intake to excess and abuse of alcohol over weekend, out of proportion to her baseline. She has some liver congestion and inflammation and this is transmitting pain. She may also have elevated lipids and triglycerides in response to excess etoh intake that could be making situation worse and will give her a dose of statin and fenofibrates. For completeness of care, will get GI consultation done as well. Keep her npo except for meds. Pain control     Gallbladder ultrasound     Fluid repletion     7. Control bp    8. Tobacco cessation counseling     9. Discontinue etoh intake.      10.  dvt and gi prophylaxis       Consultations Ordered:  IP CONSULT TO GI    Electronically signed by Mati Steen MD on 6/21/21 at 6:15 AM CDT

## 2021-06-21 NOTE — ANESTHESIA POSTPROCEDURE EVALUATION
Department of Anesthesiology  Postprocedure Note    Patient: Mark Manzanares  MRN: [id number]  [place]: 1970  Date of evaluation: 6/21/2021  Time:  4:45 PM     Procedure Summary     Date: 06/21/21 Room / Location: [facility] OR [address] [place] [address]    Anesthesia Start: 9239 Anesthesia Stop:     Procedure: CHOLECYSTECTOMY LAPAROSCOPIC (N/A Abdomen) Diagnosis: (acute calculous cholecystitis)    Surgeons: Felisa Hall MD Responsible Provider: NICOLE Friedman CRNA    Anesthesia Type: general ASA Status: 2          Anesthesia Type: No value filed. Dee Phase I: Dee Score: 10    Dee Phase II:      Last vitals: Reviewed and per EMR flowsheets.        Anesthesia Post Evaluation    Patient location during evaluation: PACU  Patient participation: complete - patient participated  Level of consciousness: obtunded/minimal responses  Pain score: 2  Airway patency: patent  Nausea & Vomiting: no nausea and no vomiting  Complications: no  Cardiovascular status: hemodynamically stable  Respiratory status: nasal cannula  Hydration status: euvolemic

## 2021-06-21 NOTE — CONSULTS
TE[employer] OF [facility] HARLEY Trent [address] 76, [address] [facility]                                  CONSULTATION    PATIENT NAME: Quiana Godwin                    :        1970  MED REC NO:   [id number]                              ROOM:       [facility]  ACCOUNT NO:   [de-identified]                           ADMIT DATE: 2021  PROVIDER:     Jessica Mason MD    CONSULT DATE:  2021    ASSESSMENT:  1. Right upper quadrant abdominal pain and nausea attributed to  cholecystitis with a 1.6 cm gallstone impacted in the neck of the  gallbladder. There is no associated elevated liver function tests or  common bile duct dilatation. 2.  Minimal-to-mild pancreatitis with slight elevation of serum lipase  144 attributed to recent increased alcohol abuse. Labs did not suggest  any significant alcoholic liver disease. RECOMMENDATIONS:  1. Empirical treatment with Protonix. 2.  Defer EGD and ERCP. 3.  Recommend treatment of hypercholesterolemia. 4.  Recommend laparoscopic cholecystectomy and surgical consultation for  second opinion is requested by myself. HISTORY OF PRESENT ILLNESS:  This 52-year-old  female  has a background history of hypertension, hypercholesterolemia, GERD,  maintained on Protonix, and mild obesity. .  She has had a background  history of excessive alcohol consumption in the past, but started  drinking more heavily after the death of her parents last year. She has  been consuming an average of two shots of whiskey or gin daily this  year. This past Friday and Saturday, she consumed a bottle of [place]. She developed abdominal pain in the right upper quadrant of the abdomen  radiating across the epigastrium this weekend _____ associated nausea  and bilious vomiting. She did not recognize any fever, chills, or  symptoms of GI bleeding. She had taken two Advil which did not relieve  the abdominal pain.   There is no background history of known viral  hepatitis, cirrhosis, or pancreatitis in the past.  She has never had an  upper GI endoscopy or colonoscopy. She was seen more than one time in the emergency room and then  eventually admitted. Initial labs were unremarkable, but then there was  elevated WBC 15.6, hemoglobin 14.9 gm, hematocrit 44.8%, and essentially  normal liver function tests, but mildly elevated serum lipase of 144. CAT scan of the abdomen reported no acute findings; however, subsequent  right upper quadrant ultrasound identified a 1.6 cm diameter gallstone  in the neck of the gallbladder with gallbladder appearing distended with  sludge. There was a trace pericystic fluid. The common bile duct is  not dilated. The liver and pancreas were unremarkable on imaging. There is no suspicion of any pancreatic enlargement, calcification, or  tumor. PAST MEDICAL HISTORY:  Alcohol abuse, hypertension,  hypercholesterolemia, GERD, and obesity. SURGICAL HISTORY:  . HOME MEDICATIONS:  See admission medication reconciliation list.  She  had been maintained on Protonix for acid reflux disease. ALLERGIES TO MEDICATION:  PENICILLINS. SOCIAL HISTORY:  Maritally . Smoked 1.5 packs per day for 30  years and has continued her habit. History of the patient's drinking  habit varies from historian to historian, but she clearly has been  drinking a moderate amount of liquor this past year. FAMILY HISTORY:  Noncontributory. REVIEW OF SYSTEMS:  The patient reports chest palpitations, but has not  had any chest pain on exertion, relieved by rest that would otherwise  suggest angina. She has no current cardiopulmonary complaints. There  is generalized weakness. PHYSICAL EXAMINATION:  VITAL SIGNS:  Afebrile. Vital signs normal.  GENERAL:  Moderate hypertension. Alert. No tremulousness. No acute  distress. HEENT:  Sclerae white. Conjunctivae pink. Buccal mucosa moist.  NECK:  Supple. LUNGS:  Clear. HEART:  No S3 or murmur. ABDOMEN:  Obese abdomen. Normal bowel sounds maintained. Tenderness  over the gallbladder in the right upper quadrant. No organomegaly  palpable. Gloved rectal exam deferred.         Jennifer Ledesma MD    D: 06/21/2021 12:12:37      T: 06/21/2021 12:18:05     [email or web address]  Job#: [id number]     Doc#: [id number]    CC:

## 2021-06-22 VITALS
SYSTOLIC BLOOD PRESSURE: 125 MMHG | BODY MASS INDEX: 33.11 KG/M2 | TEMPERATURE: 97.4 F | WEIGHT: 206 LBS | HEIGHT: 66 IN | DIASTOLIC BLOOD PRESSURE: 83 MMHG | OXYGEN SATURATION: 92 % | RESPIRATION RATE: 16 BRPM | HEART RATE: 78 BPM

## 2021-06-22 LAB
ALBUMIN SERPL-MCNC: 3.4 G/DL (ref 3.5–5.2)
ALP BLD-CCNC: 80 U/L (ref 35–104)
ALT SERPL-CCNC: 51 U/L (ref 5–33)
ANION GAP SERPL CALCULATED.3IONS-SCNC: 7 MMOL/L (ref 7–19)
AST SERPL-CCNC: 66 U/L (ref 5–32)
BILIRUB SERPL-MCNC: 0.5 MG/DL (ref 0.2–1.2)
BUN BLDV-MCNC: 9 MG/DL (ref 6–20)
CALCIUM SERPL-MCNC: 9.1 MG/DL (ref 8.6–10)
CHLORIDE BLD-SCNC: 104 MMOL/L (ref 98–111)
CO2: 29 MMOL/L (ref 22–29)
CREAT SERPL-MCNC: 0.9 MG/DL (ref 0.5–0.9)
GFR AFRICAN AMERICAN: >59
GFR NON-AFRICAN AMERICAN: >60
GLUCOSE BLD-MCNC: 125 MG/DL (ref 74–109)
HCT VFR BLD CALC: 40.2 % (ref 37–47)
HEMOGLOBIN: 13 G/DL (ref 12–16)
LIPASE: 33 U/L (ref 13–60)
MCH RBC QN AUTO: 28.4 PG (ref 27–31)
MCHC RBC AUTO-ENTMCNC: 32.3 G/DL (ref 33–37)
MCV RBC AUTO: 88 FL (ref 81–99)
PDW BLD-RTO: 16.1 % (ref 11.5–14.5)
PLATELET # BLD: 249 K/UL (ref 130–400)
PMV BLD AUTO: 10.7 FL (ref 9.4–12.3)
POTASSIUM SERPL-SCNC: 4.4 MMOL/L (ref 3.5–5)
RBC # BLD: 4.57 M/UL (ref 4.2–5.4)
SODIUM BLD-SCNC: 140 MMOL/L (ref 136–145)
TOTAL PROTEIN: 6.3 G/DL (ref 6.6–8.7)
WBC # BLD: 17.3 K/UL (ref 4.8–10.8)

## 2021-06-22 PROCEDURE — 6360000002 HC RX W HCPCS: Performed by: SURGERY

## 2021-06-22 PROCEDURE — 36415 COLL VENOUS BLD VENIPUNCTURE: CPT

## 2021-06-22 PROCEDURE — 6370000000 HC RX 637 (ALT 250 FOR IP): Performed by: SURGERY

## 2021-06-22 PROCEDURE — 2500000003 HC RX 250 WO HCPCS: Performed by: SURGERY

## 2021-06-22 PROCEDURE — 96375 TX/PRO/DX INJ NEW DRUG ADDON: CPT

## 2021-06-22 PROCEDURE — 85027 COMPLETE CBC AUTOMATED: CPT

## 2021-06-22 PROCEDURE — 99024 POSTOP FOLLOW-UP VISIT: CPT | Performed by: SURGERY

## 2021-06-22 PROCEDURE — 80053 COMPREHEN METABOLIC PANEL: CPT

## 2021-06-22 PROCEDURE — G0378 HOSPITAL OBSERVATION PER HR: HCPCS

## 2021-06-22 PROCEDURE — 96376 TX/PRO/DX INJ SAME DRUG ADON: CPT

## 2021-06-22 PROCEDURE — 96366 THER/PROPH/DIAG IV INF ADDON: CPT

## 2021-06-22 PROCEDURE — 83690 ASSAY OF LIPASE: CPT

## 2021-06-22 RX ORDER — HYDROCODONE BITARTRATE AND ACETAMINOPHEN 5; 325 MG/1; MG/1
1 TABLET ORAL EVERY 4 HOURS PRN
Qty: 12 TABLET | Refills: 0 | Status: SHIPPED | OUTPATIENT
Start: 2021-06-22 | End: 2021-06-24 | Stop reason: SDUPTHER

## 2021-06-22 RX ADMIN — METRONIDAZOLE 500 MG: 500 INJECTION, SOLUTION INTRAVENOUS at 05:11

## 2021-06-22 RX ADMIN — HYDROCODONE BITARTRATE AND ACETAMINOPHEN 2 TABLET: 5; 325 TABLET ORAL at 11:54

## 2021-06-22 RX ADMIN — FAMOTIDINE 20 MG: 10 INJECTION, SOLUTION INTRAVENOUS at 09:21

## 2021-06-22 RX ADMIN — METRONIDAZOLE 500 MG: 500 INJECTION, SOLUTION INTRAVENOUS at 14:40

## 2021-06-22 RX ADMIN — GEMFIBROZIL 600 MG: 600 TABLET ORAL at 09:19

## 2021-06-22 RX ADMIN — AMLODIPINE BESYLATE 10 MG: 10 TABLET ORAL at 09:19

## 2021-06-22 RX ADMIN — HYDROCODONE BITARTRATE AND ACETAMINOPHEN 2 TABLET: 5; 325 TABLET ORAL at 07:31

## 2021-06-22 RX ADMIN — CIPROFLOXACIN 400 MG: 2 INJECTION, SOLUTION INTRAVENOUS at 11:53

## 2021-06-22 RX ADMIN — ATORVASTATIN CALCIUM 20 MG: 40 TABLET, FILM COATED ORAL at 09:19

## 2021-06-22 RX ADMIN — CIPROFLOXACIN 400 MG: 2 INJECTION, SOLUTION INTRAVENOUS at 00:53

## 2021-06-22 ASSESSMENT — PAIN SCALES - GENERAL
PAINLEVEL_OUTOF10: 8
PAINLEVEL_OUTOF10: 9

## 2021-06-22 NOTE — DISCHARGE SUMMARY
Signed by Dr Cross Sox:   Ms. Juan Cast, a 76-year-old female, presenting to [facility] ED (06/20/2021), on account of an acute onset of epigastric pain.     Abdominal pain  Acute acalculous Cholecystitis  · CT Abd/Pelvis W IV Con (06/21/2021): Impression: No acute process in the abdomen or pelvis. · RUQ US (06/21/2021): Impression:  Cholelithiasis and probable cholecystitis. · Metronidazole and ciprofloxacin initiated on admission  · GI on board  · General surgery on board  · Status post laparoscopic cholecystectomy (06/21/2021)  · Tolerating p.o.  · Okay for discharge from general surgery standpoint.        Continue management of other chronic medical conditions       Physical Exam:  Vital Signs: BP (!) 134/90   Pulse 80   Temp 97.3 °F (36.3 °C) (Temporal)   Resp 18   Ht 5' 6\" (1.676 m)   Wt 206 lb (93.4 kg)   LMP 01/30/2019   SpO2 95%   BMI 33.25 kg/m²   Physical Exam  Vitals and nursing note reviewed. Constitutional:       General: She is not in acute distress. Appearance: Normal appearance. She is not ill-appearing, toxic-appearing or diaphoretic. HENT:      Head: Normocephalic and atraumatic. Right Ear: External ear normal.      Left Ear: External ear normal.      Nose: Nose normal. No congestion or rhinorrhea. Mouth/Throat:      Mouth: Mucous membranes are moist.      Pharynx: Oropharynx is clear. No oropharyngeal exudate or posterior oropharyngeal erythema. Eyes:      General: No scleral icterus. Right eye: No discharge. Left eye: No discharge. Extraocular Movements: Extraocular movements intact. Conjunctiva/sclera: Conjunctivae normal.      Pupils: Pupils are equal, round, and reactive to light. Neck:      Vascular: No carotid bruit. Cardiovascular:      Rate and Rhythm: Normal rate and regular rhythm. Pulses: Normal pulses. Heart sounds: Normal heart sounds. No murmur heard. No friction rub. No gallop.     Pulmonary: tablet  Take 1 tablet by mouth every 8 hours as needed for Nausea or Vomiting             pantoprazole (PROTONIX) 20 MG tablet  Take 1 tablet by mouth daily                 Discharge Instructions: Follow up with Shayla Thompson MD, MD in 7 days. Take medications as directed. Resume activity as tolerated. Diet: ADULT DIET; Regular; Low Fat (less than or equal to 50 gm/day)        DISCHARGE STATUS:    Condition: Fair  Disposition: Patient is medically stable and will be discharged home    Extended Emergency Contact Information  Primary Emergency Contact: JackyEvelyn   [address] Phone: [phone]  Relation: Child  Secondary Emergency Contact: Erika Kaplan   [address] Phone: [phone]  Relation: Child       Time Spent on discharge is  34 mins in the examination, evaluation, counseling and review of medications and discharge plan. Electronically signed by   Bayron Coates MD, MPH, MD,   Internal Medicine Hospitalist   6/22/2021 2:31 PM      Thank you Shayla Thompson MD, MD for the opportunity to be involved in this patient's care. If you have any questions or concerns please feel free to contact me at [phone]        EMR Dragon/Transcription disclaimer:   Much of this encounter note is an electronic transcription/translation of spoken language to printed text.  The electronic translation of spoken language may permit erroneous, or at times, nonsensical words or phrases to be inadvertently transcribed; although attempts have made to review the note for such errors, some may still exist.

## 2021-06-22 NOTE — DISCHARGE INSTR - DIET
 Good nutrition is important when healing from an illness, injury, or surgery. Follow any nutrition recommendations given to you during your hospital stay.  If you were given an oral nutrition supplement while in the hospital, continue to take this supplement at home. You can take it with meals, in-between meals, and/or before bedtime. These supplements can be purchased at most local grocery stores, pharmacies, and chain [employer]-stores. If you have any questions about your diet or nutrition, call the hospital and ask for the dietitian.     Regular low fat diet

## 2021-06-22 NOTE — PROGRESS NOTES
Subjective:  No acute events or changes. Reports that she is feeling better than before surgery. Having some expected post op pain. Tolerating PO. Objective:  BP (!) 134/90   Pulse 80   Temp 97.3 °F (36.3 °C) (Temporal)   Resp 18   Ht 5' 6\" (1.676 m)   Wt 206 lb (93.4 kg)   LMP 01/30/2019   SpO2 95%   BMI 33.25 kg/m²   General: NAD, AAO  Chest: regular, non-labored  Abdomen: Soft, ND, ATTP, incisions C/D/I no erythema or exudate    CBC:   Lab Results   Component Value Date    WBC 17.3 06/22/2021    RBC 4.57 06/22/2021    HGB 13.0 06/22/2021    HCT 40.2 06/22/2021    MCV 88.0 06/22/2021    MCH 28.4 06/22/2021    MCHC 32.3 06/22/2021    RDW 16.1 06/22/2021     06/22/2021    MPV 10.7 06/22/2021     CMP:    Lab Results   Component Value Date     06/22/2021    K 4.4 06/22/2021    K 4.2 11/24/2020     06/22/2021    CO2 29 06/22/2021    BUN 9 06/22/2021    CREATININE 0.9 06/22/2021    GFRAA >59 06/22/2021    LABGLOM >60 06/22/2021    GLUCOSE 125 06/22/2021    PROT 6.3 06/22/2021    LABALBU 3.4 06/22/2021    CALCIUM 9.1 06/22/2021    BILITOT 0.5 06/22/2021    ALKPHOS 80 06/22/2021    AST 66 06/22/2021    ALT 51 06/22/2021     Assessment and plan:  46year old female POD #1 s/p laparoscopic cholecystectomy with acute cholecystitis  1) Doing well POD #1.  Okay to advance diet and activity, and okay for discharge from surgical point of view  2) Follow up in general surgery clinic in 2 weeks, call for any questions or concerns

## 2021-06-23 ENCOUNTER — CARE COORDINATION (OUTPATIENT)
Dept: CASE MANAGEMENT | Age: 51
End: 2021-06-23

## 2021-06-24 ENCOUNTER — CARE COORDINATION (OUTPATIENT)
Dept: CASE MANAGEMENT | Age: 51
End: 2021-06-24

## 2021-06-24 ENCOUNTER — TELEPHONE (OUTPATIENT)
Dept: SURGERY | Age: 51
End: 2021-06-24

## 2021-06-24 DIAGNOSIS — Z90.49 S/P CHOLECYSTECTOMY: ICD-10-CM

## 2021-06-24 DIAGNOSIS — K81.9 CHOLECYSTITIS: ICD-10-CM

## 2021-06-24 RX ORDER — HYDROCODONE BITARTRATE AND ACETAMINOPHEN 5; 325 MG/1; MG/1
1 TABLET ORAL EVERY 4 HOURS PRN
Qty: 12 TABLET | Refills: 0 | Status: SHIPPED | OUTPATIENT
Start: 2021-06-24 | End: 2021-06-27

## 2021-06-24 NOTE — CARE COORDINATION
Sandee 45 Transitions Initial Follow Up Call    Call within 2 business days of discharge: Yes    Patient: Latisha Sharma Patient : 1970   MRN: [id number]    Discharge Date: 21 RARS: No data recorded    Last Discharge [facility]       Complaint Diagnosis Description Type Department Provider    21 Abdominal Pain Abdominal pain, epigastric . .. ED to Hosp-Admission (Discharged) (ADMITTED) [facility] 5 SURG Artemio Tran MD; Larry Felix Pi. .. Spoke with: N/A    Facility: [address] [place] [address]    Non-face-to-face services provided:  Reviewed encounter information for continuity of care prior to follow up Care Transitions phone call - chart notes, consults, progress notes, test results, med list, appointments, AVS, other information. Care Transitions 24 Hour Call    Care Transitions Interventions       Attempted to make contact with patient/caregiver for an initial transitions of care follow up call post discharge without success. Unable to leave a message regarding intent of call and call back information. Call was forwarded to an unidentifiable voice messaging system (mobile voice mail or telephone answering machine). As this repeated attempt to make contact was unsuccessful, will disengage at this time.         Follow Up  Future Appointments   Date Time Provider Rody Owen   2021 11:30 AM Vicky Burden MD N [id number]       Chris Mcallister RN

## 2021-06-24 NOTE — TELEPHONE ENCOUNTER
Pt called asking for more pain medication. She stated that she is still in a lot of pain since her surgery. She uses [facility]. Please call her to advise. [phone].      Thank Doug Boogie

## 2021-06-29 NOTE — OP NOTE
Operative Report    PATIENT NAME: [facility] RECORD NO. [id number]  SURGEON: Edy Wu MD MD   Primary Care Physician: Faustino Aguiar MD, MD  Date: 6/21/2021   PROCEDURE PERFORMED: Laparoscopic Cholecystectomy  PREOPERATIVE DIAGNOSIS: acute calculous cholecystitis  POSTOPERATIVE DIAGNOSIS: Same, path pending  SURGEON:  Edy Wu MD   ANESTHESIA:  General endotracheal anesthesia and local  ESTIMATED BLOOD LOSS: minimal  SPECIMEN:  Gallbladder  COMPLICATIONS:  None; patient tolerated the procedure well. FINDINGS: edema and thickening   DISPOSITION: PACU - hemodynamically stable. CONDITION: stable      Indications: the patient is a 46year old female who presented with a complaint of RUQ abdominal pain. She had an ultrasound showing findings consistent with acute cholecystitis. She is being taken at this time for laparoscopic cholecystectomy. Procedure Details     After the risks and benefits of the procedure were explained, informed consent was obtained, and the patient was taken to the operating room. The patient was placed in supine position and general anesthesia was begun. The abdomen was prepped and draped in normal sterile fashion. Preoperative antibiotics had been given. A time out was performed. Local anesthetic was injected and a 5mm supraumbilical incision was made. The base of the umbilicus was grasped and elevated and the verus needle was inserted. The abdomen was insufflated and the 5mm trochar was inserted. The camera was inserted and three additional ports were placed under direct visualization: an 11 mm subxiphoid port and two 5 mm right quadrant ports. The patient was then placed in position head up and rotated slightly to the left. The tip of the gallbladder was then grasped and elevated over the edge of the liver. It was difficult to grasp due to being dilated and tight. The aspiration needle was used to decompress the gallbladder.  The peritoneum over the neck of the

## 2021-07-01 ENCOUNTER — TELEPHONE (OUTPATIENT)
Dept: SURGERY | Age: 51
End: 2021-07-01

## 2021-07-21 PROBLEM — E86.0 DEHYDRATION: Status: RESOLVED | Noted: 2021-06-21 | Resolved: 2021-07-21

## 2022-12-02 ENCOUNTER — APPOINTMENT (OUTPATIENT)
Dept: GENERAL RADIOLOGY | Age: 52
DRG: 885 | End: 2022-12-02
Payer: MEDICARE

## 2022-12-02 ENCOUNTER — HOSPITAL ENCOUNTER (INPATIENT)
Age: 52
LOS: 3 days | Discharge: HOME | DRG: 885 | End: 2022-12-05
Attending: EMERGENCY MEDICINE | Admitting: PSYCHIATRY & NEUROLOGY
Payer: MEDICARE

## 2022-12-02 DIAGNOSIS — F22 DELUSIONAL DISORDER (HCC): Primary | ICD-10-CM

## 2022-12-02 PROBLEM — F03.918: Status: ACTIVE | Noted: 2022-12-02

## 2022-12-02 LAB
ACETAMINOPHEN LEVEL: <5 UG/ML (ref 10–30)
ALBUMIN SERPL-MCNC: 4.4 G/DL (ref 3.5–5.2)
ALP BLD-CCNC: 87 U/L (ref 35–104)
ALT SERPL-CCNC: 18 U/L (ref 5–33)
AMPHETAMINE SCREEN, URINE: NEGATIVE
ANION GAP SERPL CALCULATED.3IONS-SCNC: 10 MMOL/L (ref 7–19)
AST SERPL-CCNC: 22 U/L (ref 5–32)
BACTERIA: NEGATIVE /HPF
BARBITURATE SCREEN URINE: NEGATIVE
BASOPHILS ABSOLUTE: 0 K/UL (ref 0–0.2)
BASOPHILS RELATIVE PERCENT: 0.3 % (ref 0–1)
BENZODIAZEPINE SCREEN, URINE: NEGATIVE
BILIRUB SERPL-MCNC: 0.6 MG/DL (ref 0.2–1.2)
BILIRUBIN URINE: NEGATIVE
BLOOD, URINE: ABNORMAL
BUN BLDV-MCNC: 15 MG/DL (ref 6–20)
BUPRENORPHINE URINE: NEGATIVE
CALCIUM SERPL-MCNC: 10.4 MG/DL (ref 8.6–10)
CANNABINOID SCREEN URINE: NEGATIVE
CHLORIDE BLD-SCNC: 105 MMOL/L (ref 98–111)
CLARITY: ABNORMAL
CO2: 24 MMOL/L (ref 22–29)
COCAINE METABOLITE SCREEN URINE: NEGATIVE
COLOR: YELLOW
CREAT SERPL-MCNC: 0.9 MG/DL (ref 0.5–0.9)
CRYSTALS, UA: ABNORMAL /HPF
EOSINOPHILS ABSOLUTE: 0.1 K/UL (ref 0–0.6)
EOSINOPHILS RELATIVE PERCENT: 0.4 % (ref 0–5)
EPITHELIAL CELLS, UA: 8 /HPF (ref 0–5)
ETHANOL: <10 MG/DL (ref 0–0.08)
GFR SERPL CREATININE-BSD FRML MDRD: >60 ML/MIN/{1.73_M2}
GLUCOSE BLD-MCNC: 113 MG/DL (ref 74–109)
GLUCOSE URINE: NEGATIVE MG/DL
HCG QUALITATIVE: NEGATIVE
HCT VFR BLD CALC: 46 % (ref 37–47)
HEMOGLOBIN: 14.9 G/DL (ref 12–16)
HYALINE CASTS: 7 /HPF (ref 0–8)
IMMATURE GRANULOCYTES #: 0 K/UL
KETONES, URINE: NEGATIVE MG/DL
LEUKOCYTE ESTERASE, URINE: ABNORMAL
LYMPHOCYTES ABSOLUTE: 2.1 K/UL (ref 1.1–4.5)
LYMPHOCYTES RELATIVE PERCENT: 17.7 % (ref 20–40)
Lab: NORMAL
MCH RBC QN AUTO: 27.6 PG (ref 27–31)
MCHC RBC AUTO-ENTMCNC: 32.4 G/DL (ref 33–37)
MCV RBC AUTO: 85.3 FL (ref 81–99)
METHADONE SCREEN, URINE: NEGATIVE
METHAMPHETAMINE, URINE: NEGATIVE
MONOCYTES ABSOLUTE: 0.9 K/UL (ref 0–0.9)
MONOCYTES RELATIVE PERCENT: 7.4 % (ref 0–10)
NEUTROPHILS ABSOLUTE: 9 K/UL (ref 1.5–7.5)
NEUTROPHILS RELATIVE PERCENT: 74 % (ref 50–65)
NITRITE, URINE: NEGATIVE
OPIATE SCREEN URINE: NEGATIVE
OXYCODONE URINE: NEGATIVE
PDW BLD-RTO: 15.5 % (ref 11.5–14.5)
PH UA: 5.5 (ref 5–8)
PHENCYCLIDINE SCREEN URINE: NEGATIVE
PLATELET # BLD: 231 K/UL (ref 130–400)
PMV BLD AUTO: 9.7 FL (ref 9.4–12.3)
POTASSIUM SERPL-SCNC: 3.6 MMOL/L (ref 3.5–5)
PROPOXYPHENE SCREEN: NEGATIVE
PROTEIN UA: =>1000 MG/DL
RBC # BLD: 5.39 M/UL (ref 4.2–5.4)
RBC UA: 6 /HPF (ref 0–4)
SALICYLATE, SERUM: <0.3 MG/DL (ref 3–10)
SARS-COV-2, NAAT: NOT DETECTED
SODIUM BLD-SCNC: 139 MMOL/L (ref 136–145)
SPECIFIC GRAVITY UA: 1.02 (ref 1–1.03)
TOTAL PROTEIN: 8.2 G/DL (ref 6.6–8.7)
TRICYCLIC, URINE: NEGATIVE
TROPONIN: <0.01 NG/ML (ref 0–0.03)
UROBILINOGEN, URINE: 0.2 E.U./DL
WBC # BLD: 12.1 K/UL (ref 4.8–10.8)
WBC UA: 23 /HPF (ref 0–5)

## 2022-12-02 PROCEDURE — 87086 URINE CULTURE/COLONY COUNT: CPT

## 2022-12-02 PROCEDURE — 6370000000 HC RX 637 (ALT 250 FOR IP): Performed by: PSYCHIATRY & NEUROLOGY

## 2022-12-02 PROCEDURE — 84484 ASSAY OF TROPONIN QUANT: CPT

## 2022-12-02 PROCEDURE — 80053 COMPREHEN METABOLIC PANEL: CPT

## 2022-12-02 PROCEDURE — 82077 ASSAY SPEC XCP UR&BREATH IA: CPT

## 2022-12-02 PROCEDURE — 81001 URINALYSIS AUTO W/SCOPE: CPT

## 2022-12-02 PROCEDURE — 80143 DRUG ASSAY ACETAMINOPHEN: CPT

## 2022-12-02 PROCEDURE — 71045 X-RAY EXAM CHEST 1 VIEW: CPT

## 2022-12-02 PROCEDURE — 80179 DRUG ASSAY SALICYLATE: CPT

## 2022-12-02 PROCEDURE — 1240000000 HC EMOTIONAL WELLNESS R&B

## 2022-12-02 PROCEDURE — 84703 CHORIONIC GONADOTROPIN ASSAY: CPT

## 2022-12-02 PROCEDURE — 93005 ELECTROCARDIOGRAM TRACING: CPT | Performed by: EMERGENCY MEDICINE

## 2022-12-02 PROCEDURE — 85025 COMPLETE CBC W/AUTO DIFF WBC: CPT

## 2022-12-02 PROCEDURE — 80306 DRUG TEST PRSMV INSTRMNT: CPT

## 2022-12-02 PROCEDURE — 87635 SARS-COV-2 COVID-19 AMP PRB: CPT

## 2022-12-02 PROCEDURE — 36415 COLL VENOUS BLD VENIPUNCTURE: CPT

## 2022-12-02 PROCEDURE — 99285 EMERGENCY DEPT VISIT HI MDM: CPT

## 2022-12-02 RX ORDER — DICYCLOMINE HYDROCHLORIDE 10 MG/1
10 CAPSULE ORAL
Status: DISCONTINUED | OUTPATIENT
Start: 2022-12-02 | End: 2022-12-05 | Stop reason: HOSPADM

## 2022-12-02 RX ORDER — ACETAMINOPHEN 325 MG/1
650 TABLET ORAL EVERY 4 HOURS PRN
Status: DISCONTINUED | OUTPATIENT
Start: 2022-12-02 | End: 2022-12-05 | Stop reason: HOSPADM

## 2022-12-02 RX ORDER — TRAZODONE HYDROCHLORIDE 50 MG/1
50 TABLET ORAL NIGHTLY PRN
Status: DISCONTINUED | OUTPATIENT
Start: 2022-12-02 | End: 2022-12-05 | Stop reason: HOSPADM

## 2022-12-02 RX ORDER — POLYETHYLENE GLYCOL 3350 17 G/17G
17 POWDER, FOR SOLUTION ORAL DAILY PRN
Status: DISCONTINUED | OUTPATIENT
Start: 2022-12-02 | End: 2022-12-05 | Stop reason: HOSPADM

## 2022-12-02 RX ORDER — HYDROXYZINE HYDROCHLORIDE 25 MG/1
25 TABLET, FILM COATED ORAL 3 TIMES DAILY PRN
Status: DISCONTINUED | OUTPATIENT
Start: 2022-12-02 | End: 2022-12-05 | Stop reason: HOSPADM

## 2022-12-02 RX ORDER — AMLODIPINE BESYLATE 10 MG/1
10 TABLET ORAL DAILY
Status: DISCONTINUED | OUTPATIENT
Start: 2022-12-02 | End: 2022-12-05 | Stop reason: HOSPADM

## 2022-12-02 RX ORDER — RISPERIDONE 1 MG/1
1 TABLET ORAL 2 TIMES DAILY
Status: DISCONTINUED | OUTPATIENT
Start: 2022-12-02 | End: 2022-12-03

## 2022-12-02 RX ORDER — PANTOPRAZOLE SODIUM 20 MG/1
20 TABLET, DELAYED RELEASE ORAL
Status: DISCONTINUED | OUTPATIENT
Start: 2022-12-03 | End: 2022-12-05 | Stop reason: HOSPADM

## 2022-12-02 RX ORDER — ATORVASTATIN CALCIUM 40 MG/1
40 TABLET, FILM COATED ORAL NIGHTLY
Status: DISCONTINUED | OUTPATIENT
Start: 2022-12-02 | End: 2022-12-05 | Stop reason: HOSPADM

## 2022-12-02 RX ORDER — TRAZODONE HYDROCHLORIDE 50 MG/1
50 TABLET ORAL NIGHTLY
Status: DISCONTINUED | OUTPATIENT
Start: 2022-12-02 | End: 2022-12-05 | Stop reason: HOSPADM

## 2022-12-02 RX ADMIN — RISPERIDONE 1 MG: 1 TABLET ORAL at 15:23

## 2022-12-02 RX ADMIN — DICYCLOMINE HYDROCHLORIDE 10 MG: 10 CAPSULE ORAL at 17:06

## 2022-12-02 RX ADMIN — TRAZODONE HYDROCHLORIDE 50 MG: 50 TABLET ORAL at 21:19

## 2022-12-02 RX ADMIN — ATORVASTATIN CALCIUM 40 MG: 40 TABLET, FILM COATED ORAL at 21:19

## 2022-12-02 RX ADMIN — DICYCLOMINE HYDROCHLORIDE 10 MG: 10 CAPSULE ORAL at 21:19

## 2022-12-02 RX ADMIN — AMLODIPINE BESYLATE 10 MG: 10 TABLET ORAL at 15:23

## 2022-12-02 RX ADMIN — RISPERIDONE 1 MG: 1 TABLET ORAL at 21:19

## 2022-12-02 ASSESSMENT — SLEEP AND FATIGUE QUESTIONNAIRES
AVERAGE NUMBER OF SLEEP HOURS: 5
DO YOU USE A SLEEP AID: NO
DO YOU HAVE DIFFICULTY SLEEPING: YES
SLEEP PATTERN: RESTLESSNESS

## 2022-12-02 ASSESSMENT — ENCOUNTER SYMPTOMS
COUGH: 0
SHORTNESS OF BREATH: 0
VOMITING: 0
CHEST TIGHTNESS: 1

## 2022-12-02 ASSESSMENT — LIFESTYLE VARIABLES
HOW MANY STANDARD DRINKS CONTAINING ALCOHOL DO YOU HAVE ON A TYPICAL DAY: PATIENT DOES NOT DRINK
HOW OFTEN DO YOU HAVE A DRINK CONTAINING ALCOHOL: NEVER

## 2022-12-02 NOTE — ED NOTES
Pt in paper scrubs, sitter at bedside     [facility]  12/02/22 0184

## 2022-12-02 NOTE — PROGRESS NOTES
[address] [facility] Admission Note  Nursing Admission Note        Reason for Admission: Janny Rene is a 45 yr old female who came to the ED , \"My daughter put me out last month, I've been staying with my son. I've been getting all these messages on my phone and they are from God. Last night I got a message to go to [facility] and then I had to keep walking all around. \"     Patient seen in ED exam room 18 lying on bed. Patient is dressed in paper scrubs, has one-to-one sitter at bedside, patient appears anxious, is restless and fidgety, keeps getting up from the bed and walking around room attempting to show writer images and messages on her cell phone. She reports history of anxiety and depression, has not taken medications \"in awhile\". Reports history of polysubstance use including marijuana and cocaine, however patient reports has not used \"in awhile, I found God\". She reports she has been getting multiple messages on her phone all day, everyday and many of the messages are from God. Last night, she was found by police on [address] staring up at that eagle and when police questioned her she stated \"you don't see him? It's God and it's the end of the world\". Patient denies suicidal ideations and homicidal ideations. Reports \"seeing God a few times holding his stick\". Denies auditory hallucinations. Patient is paranoid that someone has been recording her and taking pictures of her on her own phone. Denies alcohol use currently, reports alcohol abuse as recent as a couple months ago. Denies substance abuse currently. Denies history of suicide attempts and denies history of psychiatric hospitalizations. Patient Active Problem List   Diagnosis    GERD (gastroesophageal reflux disease)    Hyperlipidemia    Asthma    Atherosclerosis of native artery of extremity with intermittent claudication (HCC)    Abdominal pain    Hypertension    Nausea    Hypertriglyceridemia    Obesity (BMI 30-39. 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, with behavioral disturbance         Addictive Behavior:   Addictive Behavior  In the Past 3 Months, Have You Felt or Has Someone Told You That You Have a Problem With  : None    Medical Problems:   Past Medical History:   Diagnosis Date    Alcohol abuse, episodic drinking behavior 6/21/2021    Asthma     Atherosclerosis of native arteries of the extremities with intermittent claudication 1/24/2013    GERD (gastroesophageal reflux disease)     HTN (hypertension)     Hyperlipidemia     Hypertension 6/21/2021    Obesity (BMI 30-39.9) 6/21/2021       Status EXAM:  Mental Status and Behavioral Exam  Normal: Yes  Level of Assistance: Independent/Self  Facial Expression: Worried  Affect: Congruent  Level of Consciousness: Alert  Frequency of Checks: 4 times per hour, close  Mood:Normal: No  Mood: Anxious, Suspicious  Motor Activity:Normal: Yes  Eye Contact: Good  Observed Behavior: Friendly, Cooperative, Preoccupied  Sexual Misconduct History: Past - no  Preception: [place] to person, [place] to time, [place] to place, [place] to situation  Attention:Normal: No  Attention: Unable to concentrate  Thought Processes: Circumstantial  Thought Content:Normal: No  Thought Content: Ideas of influence, Delusions  Depression Symptoms: No problems reported or observed. Anxiety Symptoms: No problems reported or observed., Generalized  Yamilet Symptoms: Poor judgment, Pressured speech  Hallucinations: None  Delusions: Yes  Delusions: [place]  Memory:Normal: Yes  Insight and Judgment: No  Insight and Judgment: Poor judgment, Poor insight, Unrealistic    Psych:   Suicidal Ideation: no.  If yes, is there a plan? (Describe)               Risk of Suicide: No Risk   Homicidal Ideation:  no.  If yes, describe: Auditory/Visual Hallucinations:  no.      If yes, describe AVH?      Metabolic Screening:  No results found for: LABA1C  Lab Results   Component Value Date    CHOL 323 (H) 06/21/2021     Lab Results   Component Value Date    TRIG 324 (H) 06/21/2021     Lab Results   Component Value Date    HDL 46 (L) 06/21/2021     No components found for: LDLCAL  No results found for: LABVLDL  Body mass index is 29.05 kg/m². BP Readings from Last 2 Encounters:   12/02/22 (!) 142/92   06/22/21 125/83       PATIENT STRENGTHS and Barriers:   Patient Strengths/Barriers  Strengths (Must Choose Two): Adequate financial resources, Support from friends, Support from family, Stable housing, Spirituality, Support from organized community  Barriers: Technical/vocation      Tobacco Screening:  Practical Counseling, on admission, mayank X, if applicable and completed (first 3 are required if patient doesn't refuse):            Recognizing danger situations (included triggers and roadblocks)                Coping skills (new ways to manage stress, exercise, relaxation techniques, changing routine, distraction                                                    Basic information about quitting (benefits of quitting, techniques in how to quit, available resources  Referral for counseling faxed to Y[facility]uda                                            Patient refused counseling yes  Patient has not smoked in the last 30 days   Patient offered nicotine patch, yes Received no  Refused no  Patient is a non-smoker no       Admission to Unit:    Pt admitted to [facility] under the care of Dr. Marylen Meals,  arrived on unit via [facility] with security and staff from ED    Patient arrived dressed in paper scrubs:  yes. Body assessment and safety check completed by [employer] and  no contraband discovered. Patient belongings and valuables was cataloged and accounted for by Louisa Engle.      Admission completed by Destiney Escudero to unit, unit policy and expectations:  yes    Reviewed and explained all legal documents:  no    Education for [place] and Restraints given: yes    Patient signed all legal documents no   Pt verbalizes understanding:yes     Ana Shirts Obtained? yes    Medical Bed:  Does patient require a medical bed? no  If answered yes for medical bed use, does the patient have the following conditions? High risk for falls? no   Obstructive sleep apnea? no   Oxygen Use? no   Use of assistive devices? no   Other, (explain)? Identifies stressors. yes   Spending too much time on cell phone. Protective Factors:  Patient identifies protective factors with nursing staff as follows: Identifies reasons for living: Yes   Supportive Social Network or family: Yes    Belief that suicide is immoral/high spirituality: Yes   Responsibility to family or others/living with family: Yes   Fear of death or dying due to pain and suffering: Yes   Engaged in work or school: No     If Patient is unable to identify, reason why? Admission Note:    Patient arrived on unit with  and ED nurse. Patient is calm, cooperative, and coherent. Patient is stating the reason she is here is because she has been spending too much time on her cell phone and has started to believe some of the outrageous ads and posting she sees on tik tok. Patient says she does not use drugs or alcohol. Patient was oriented to unit and searched, no contraband found. Patient currently resting in bed .           Electronically signed by Laureano Gonzalez RN on 12/2/22 at 3:13 PM CST

## 2022-12-02 NOTE — ED NOTES
Report called to Marina Cedillo on 6th floor      [facility]  12/02/22 1577

## 2022-12-02 NOTE — PLAN OF CARE
Problem: Anxiety  Goal: Will report anxiety at manageable levels  Description: INTERVENTIONS:  1. Administer medication as ordered  2. Teach and rehearse alternative coping skills  3. Provide emotional support with 1:1 interaction with staff  Outcome: Progressing     Problem: Confusion  Goal: Confusion, delirium, dementia, or psychosis is improved or at baseline  Description: INTERVENTIONS:  1. Assess for possible contributors to thought disturbance, including medications, impaired vision or hearing, underlying metabolic abnormalities, dehydration, psychiatric diagnoses, and notify attending LIP  2. [place] high risk fall precautions, as indicated  3. Provide frequent short contacts to provide reality reorientation, refocusing and direction  4. Decrease environmental stimuli, including noise as appropriate  5. Monitor and intervene to maintain adequate nutrition, hydration, elimination, sleep and activity  6. If unable to ensure safety without constant attention obtain sitter and review sitter guidelines with assigned personnel  7. Initiate Psychosocial CNS and Spiritual Care consult, as indicated  Outcome: Progressing     Problem: Involuntary Admit  Goal: Will cooperate with staff recommendations and doctor's orders and will demonstrate appropriate behavior  Description: INTERVENTIONS:  1. Treat underlying conditions and offer medication as ordered  2. Educate regarding involuntary admission procedures and rules  3.  Contain excessive/inappropriate behavior per unit and hospital policies  Outcome: Progressing

## 2022-12-02 NOTE — ED NOTES
Officer Deisy Gonzalez returned call to ED to give further details about why patient was brought to ED. Officer Deisy Gonzalez reports to nurse that PD was dispatched at the corner of [address] [place] [address] because patient was standing there and told officers that God told her to go to that location because the world is going to end. Officers felt that patient was mentally unstable and they dispatched EMS to bring patient to ED for mental health evaluation.  Kandy Don [facility] AN AFFILIATE OF [facility] notified of this information      Jomar Degroot RN  12/02/22 [id number]

## 2022-12-02 NOTE — ED PROVIDER NOTES
[address] [facility]  eMERGENCY dEPARTMENT eNCOUnter      Pt Name: Darlene Lopez  MRN: [id number]  [place] 1970  Date of evaluation: 12/2/2022  Provider: Sanjiv Back MD    CHIEF COMPLAINT       Chief Complaint   Patient presents with    Mental Health Problem     PT states \" I dont know why Im here\" pt refusing to answer any further questions or speak at all. Pt sent here by PD for psych eval, per EMS pt refused SI, pt refusing to answer or speak anymore at this time         HISTORY OF PRESENT ILLNESS   (Location/Symptom, Timing/Onset,Context/Setting, Quality, Duration, Modifying Factors, Severity)  Note limiting factors. Darlene Lopez is a 45 y.o. female who presents to the emergency department for mental health evaluation. 58-year-old female referred by police for mental health evaluation. Apparently found walking away from home and away from any of her kids. Initially she did not want to talk. She states she was not walking and vein. That she was following the wheel of guide. She reports that she has been receiving text messages from God. And that no one believes her. I did not question her believes. But suggested that she would have to undergo a complete evaluation here. She had told the nurse she is having some chest tightness. She seemed to open up after that. I found that she is recently moved to this area about 2 weeks from [place]. She admits she is under a lot of stress and strain. And that admission might be helpful. She did not voice any overt suicidal ideation not homicidal.    The history is provided by the patient. NursingNotes were reviewed. REVIEW OF SYSTEMS    (2-9 systems for level 4, 10 or more for level 5)     Review of Systems   Reason unable to perform ROS: This is a limited review of systems. I did not pressure the patient in order to make her feel included plus I am having the MARY see her who may be able to get more information as she opens up. Constitutional:  Negative for chills and fever. HENT:  Negative for congestion. Respiratory:  Positive for chest tightness. Negative for cough and shortness of breath. Gastrointestinal:  Negative for vomiting. Genitourinary:  Negative for dysuria. Psychiatric/Behavioral:  Negative for self-injury and suicidal ideas. The patient is nervous/anxious. Text messages from God. All other systems reviewed and are negative. A complete review of systems was performed and is negative except as noted above in the HPI.        PAST MEDICAL HISTORY     Past Medical History:   Diagnosis Date    Alcohol abuse, episodic drinking behavior 2021    Asthma     Atherosclerosis of native arteries of the extremities with intermittent claudication 2013    GERD (gastroesophageal reflux disease)     HTN (hypertension)     Hyperlipidemia     Hypertension 2021    Obesity (BMI 30-39.9) 2021         SURGICAL HISTORY       Past Surgical History:   Procedure Laterality Date     SECTION      CHOLECYSTECTOMY, LAPAROSCOPIC N/A 2021    CHOLECYSTECTOMY LAPAROSCOPIC performed by Micaela Silveira MD at [facility]       Previous Medications    No medications on file       ALLERGIES     Pcn [penicillins]    FAMILY HISTORY       Family History   Problem Relation Age of Onset    Cancer Maternal Aunt     Diabetes Maternal Uncle     Diabetes Maternal Grandmother     Cancer Maternal Grandfather           SOCIAL HISTORY       Social History     Socioeconomic History    Marital status: Legally      Spouse name: None    Number of children: None    Years of education: None    Highest education level: None   Tobacco Use    Smoking status: Every Day     Packs/day: 1.50     Years: 30.00     Pack years: 45.00     Types: Cigarettes    Smokeless tobacco: Never   Vaping Use    Vaping Use: Never used   Substance and Sexual Activity    Alcohol use: Not Currently     Alcohol/week: 3.0 standard drinks     Types: 3 Shots of liquor per week     Comment: occ    Drug use: Yes     Frequency: 10.0 times per week     Types: Marijuana (Weed)    Sexual activity: Not Currently       SCREENINGS    Chastity Coma Scale  Eye Opening: Spontaneous  Best Verbal Response: Oriented  Best Motor Response: Obeys commands  Chastity Coma Scale Score: 15        PHYSICAL EXAM    (up to 7 for level 4, 8 or more for level 5)     ED Triage Vitals [12/02/22 0817]   BP Temp Temp Source Heart Rate Resp SpO2 Height Weight   (!) 160/105 97.1 °F (36.2 °C) Oral 90 17 97 % 5' 6\" (1.676 m) --       Physical Exam  Vitals and nursing note reviewed. Constitutional:       General: She is not in acute distress. Appearance: She is well-developed. HENT:      Head: Normocephalic and atraumatic. Right Ear: External ear normal.      Left Ear: External ear normal.   Eyes:      Conjunctiva/sclera: Conjunctivae normal.      Pupils: Pupils are equal, round, and reactive to light. Cardiovascular:      Rate and Rhythm: Normal rate. Pulmonary:      Effort: Pulmonary effort is normal.   Musculoskeletal:         General: No signs of injury. Normal range of motion. Cervical back: Normal range of motion and neck supple. Skin:     General: Skin is warm and dry. Neurological:      Mental Status: She is alert and oriented to person, place, and time. Psychiatric:         Attention and Perception: She is inattentive (Initially). Mood and Affect: Mood is depressed. Affect is flat. Behavior: Behavior is cooperative. Thought Content: Thought content does not include homicidal or suicidal ideation. Comments: Very soft speech poor eye contact. DIAGNOSTIC RESULTS     EKG: All EKG's are interpreted by the Emergency Department Physician who either signs or Co-signs this chart in the absence of a cardiologist.    Sinus rhythm rate 89. DE interval 131. QTc 452. No ST abnormality to suggest ischemia.   May be voltage criteria for left ventricular hypertrophy. RADIOLOGY:   Non-plain film images such as CT, Ultrasound and MRI are read by the radiologist. Plainradiographic images are visualized and preliminarily interpreted by the emergency physician with the below findings:    I have reviewed the results. Interpretation per the Radiologist below, if available at the time of this note:    XR CHEST PORTABLE   Final Result   No acute cardiopulmonary process identified.             ED BEDSIDE ULTRASOUND:   Performed by ED Physician - none    LABS:  Labs Reviewed   CBC WITH AUTO DIFFERENTIAL - Abnormal; Notable for the following components:       Result Value    WBC 12.1 (*)     MCHC 32.4 (*)     RDW 15.5 (*)     Neutrophils % 74.0 (*)     Lymphocytes % 17.7 (*)     Neutrophils Absolute 9.0 (*)     All other components within normal limits   COMPREHENSIVE METABOLIC PANEL - Abnormal; Notable for the following components:    Glucose 113 (*)     Calcium 10.4 (*)     All other components within normal limits   URINALYSIS WITH REFLEX TO CULTURE - Abnormal; Notable for the following components:    Clarity, UA CLOUDY (*)     Blood, Urine MODERATE (*)     Leukocyte Esterase, Urine SMALL (*)     All other components within normal limits   ACETAMINOPHEN LEVEL - Abnormal; Notable for the following components:    Acetaminophen Level <5 (*)     All other components within normal limits   SALICYLATE LEVEL - Abnormal; Notable for the following components:    Salicylate, Serum <7.7 (*)     All other components within normal limits   MICROSCOPIC URINALYSIS - Abnormal; Notable for the following components:    Bacteria, UA NEGATIVE (*)     Crystals, UA NEG (*)     WBC, UA 23 (*)     RBC, UA 6 (*)     All other components within normal limits   COVID-19, RAPID   CULTURE, URINE   TROPONIN   HCG, SERUM, QUALITATIVE   DRUG SCRN, BUPRENORPHINE   ETHANOL       All other labs were within normal range or not returned as of this dictation. EMERGENCY DEPARTMENT COURSE and DIFFERENTIALDIAGNOSIS/MDM:   Vitals:    Vitals:    12/02/22 0817   BP: (!) 160/105   Pulse: 90   Resp: 17   Temp: 97.1 °F (36.2 °C)   TempSrc: Oral   SpO2: 97%   Height: 5' 6\" (1.676 m)       MDM  Number of Diagnoses or Management Options  Delusional disorder ([facility].)  Diagnosis management comments: Symptoms suggestive of stress psychosis. I will have the MARY help in the evaluation. Urine showed some pyuria but no nitrates or bacteria and the patient is asymptomatic I will order a culture. Rest the lab work is satisfactory. Blood pressure was elevated on admission. I did place the patient on hold she was threatening to leave as soon as she got here. Psychiatry has evaluated and accepted the patient for admission. CONSULTS:  None    PROCEDURES:  Unless otherwise notedbelow, none     Procedures    FINAL IMPRESSION     1.  Delusional disorder [facility])          DISPOSITION/PLAN   DISPOSITION Decision To Admit 12/02/2022 12:41:46 PM      PATIENT REFERRED TO:  @FUP@    DISCHARGE MEDICATIONS:  New Prescriptions    No medications on file          (Please note that portions of this note were completed with a voice recognition program.  Efforts were made to edit the dictations butoccasionally words are mis-transcribed.)    Lilly Mckeon MD (electronically signed)  AttendingEmergency Physician          Alban Albarran MD  12/02/22 0043

## 2022-12-02 NOTE — PROGRESS NOTES
[facility] ADULT INITIAL INTAKE ASSESSMENT     12/2/22    Kurtis Diego ,a 45 y.o. female, presents to the ED for a psychiatric assessment. ED Arrival time: [address] [facility]  ED physician: Ray Montoya [facility] Notification time: in ED  [facility] Assessment start time: 1110  Psychiatrist call time: 21 [phone] with Dr. Pinky Taylor    Patient is referred by: EMS/Police    Reason for visit to ED - Presenting problem:     PT states reason for ED visit, \"My daughter put me out last month, I've been staying with my son. I've been getting all these messages on my phone and they are from God. Last night I got a message to go to [facility] and then I had to keep walking all around. \"    Patient seen in ED exam room 18 lying on bed. Patient is dressed in paper scrubs, has one-to-one sitter at bedside, patient appears anxious, is restless and fidgety, keeps getting up from the bed and walking around room attempting to show writer images and messages on her cell phone. She reports history of anxiety and depression, has not taken medications \"in awhile\". Reports history of polysubstance use including marijuana and cocaine, however patient reports has not used \"in awhile, I found God\". She reports she has been getting multiple messages on her phone all day, everyday and many of the messages are from God. Last night, she was found by police on [address] staring up at that eagle and when police questioned her she stated \"you don't see him? It's God and it's the end of the world\". Patient denies suicidal ideations and homicidal ideations. Reports \"seeing God a few times holding his stick\". Denies auditory hallucinations. Patient is paranoid that someone has been recording her and taking pictures of her on her own phone. Denies alcohol use currently, reports alcohol abuse as recent as a couple months ago. Denies substance abuse currently. Denies history of suicide attempts and denies history of psychiatric hospitalizations.     ED Provider reports: 53-year-old female referred by police for mental health evaluation. Apparently found walking away from home and away from any of her kids. Initially she did not want to talk. She states she was not walking and vein. That she was following the wheel of guide. She reports that she has been receiving text messages from God. And that no one believes her. I did not question her believes. But suggested that she would have to undergo a complete evaluation here. She had told the nurse she is having some chest tightness. She seemed to open up after that. I found that she is recently moved to this area about 2 weeks from [place]. She admits she is under a lot of stress and strain. And that admission might be helpful.   She did not voice any overt suicidal ideation not homicidal.    Duration of symptoms: few weeks    Current Stressors: family and \"messages from [employer] Completed: yes  Risk Assessment Completed:yes  Risk of Suicide: No Risk  Provider notified of the C-SSRS Screening and Risk Assessment Findings:yes    SI:  denies   Plan: no   Past SI attempts: no   If yes, when and how many times:  Describe suicide attempts:   HI: denies  If yes describe:   Delusions: has  If yes describe: paranoid,   Hallucinations: admits to   If yes describe: seeing GOD  Risk of Harm to self: Self injurious/self mutilation behaviorsno   If yes explain:   Was it within the past 6 months: no   Risk of Harm to others: no   If yes explain:   Was it within the past 6 months: no   Trauma History:  Anxiety 1-10:  10  Explain if increased:   Depression 1-10:  0  Explain if increased:   Level of function outside hospital decreased: yes   If yes explain: poor sleep   Has patient been completing ADL's: yes    Mental Status Evaluation:     Appearance:  age appropriate   Behavior:  Restless & fidgety   Speech:  normal pitch and normal volume   Mood:  anxious   Affect:  mood-congruent   Thought Process:  circumstantial   Thought Content:  delusions and hallucinations   Sensorium:  person, place, time/date, and situation   Cognition:  limite   Insight:  limited         Psychiatric Hospitalizations: No   Where & When: n/a  Outpatient Psychiatric Treatment: denies    Family History:    Family history of mental illness: yes mother Alzheimer's  Family members with suicide attempt: no   If yes explain (attempted or completed):    Substance Abuse History:     SBIRT Completed: yes  Brief Intervention completed if needed:  (Yes/No)    Current ETOH LEVELS: < 10    ETOH Usage:     Amount drinking daily: denied    Date of last drink:   Longest period of sobriety:    Substance/Chemical Abuse/Recreational Drug History:  Substance used: marijuana and cocaine  Date of last substance use: unknown  Tobacco Use:    History of rehab treatment: denied  How many times in rehab:    Last time in rehab:  Family history of substance abuse:     Opiates: It was discussed with pt they would not be receiving opiates unless they were within 3 days post surgery/acute injury. Patient voiced understanding and agreed. Psychiatric Review Of Systems:     Recent Sleep changes: yes   Recent appetite changes: no   Recent weight changes/Pounds gained (+) or lost (-): no      Medical History:     Medical Diagnosis/Issues: depression, anxiety  CT today in ED:no  Use of 02 or CPAP: no  Ambulatory: yes  Independent or Need assistance with Self Care:     PCP: Fran Yarbrough MD, MD     Current Medications:   Scheduled Meds: No current facility-administered medications for this encounter. No current outpatient medications on file. Collateral Information:     Name: Demontrel  Relationship: son  Phone Number: [phone]  Collateral: Son reports mother lives with him currently. No history of psychiatric issues, no attempts, no hospitalizations. No guns at home. No legal problems.      Current living arrangement:Lives with son  Current Support System: family  Employment: Disposition:     Choose one of the options below for disposition:     1. Decision to admit to :yes    If yes, which unit Adult or Geriatric Unit:  Adult  Is patient voluntary: no  If no has a 72 hold been initiated: yes in ED  Admission Diagnosis: Psychosis, unspecified    Does the patient have a guardian or Medical POA: no  Has the guardian been notified or Medical POA:       2. Decision to Discharge:   Does not meet criteria for acceptance to   unit due to: n/a    3.  Transferred:       Patient was transferred due to: n/a    Other follow up information provided:      Sherren Mcclintock, RN

## 2022-12-02 NOTE — ED TRIAGE NOTES
Pt speaking now, states nobody believes her but that God has been texting her and sending her pictures of herself talking to people who are already in heaven, so she was walking since 1600 yesterday to go say goodbye to her kids. Pt did not know where she was when found by PD, pt was not near her house or any of her kids houses. Pt denies SI/HI.  Pt states she cant find the texts to prove them to us, so we wont believe her but they \"were there\"

## 2022-12-03 PROBLEM — F29 PSYCHOSIS, UNSPECIFIED PSYCHOSIS TYPE (HCC): Status: ACTIVE | Noted: 2022-12-03

## 2022-12-03 PROBLEM — F22 DELUSIONAL DISORDER (HCC): Status: ACTIVE | Noted: 2022-12-03

## 2022-12-03 PROCEDURE — 6370000000 HC RX 637 (ALT 250 FOR IP): Performed by: PSYCHIATRY & NEUROLOGY

## 2022-12-03 PROCEDURE — 1240000000 HC EMOTIONAL WELLNESS R&B

## 2022-12-03 RX ORDER — RISPERIDONE 1 MG/1
1 TABLET ORAL NIGHTLY
Status: DISCONTINUED | OUTPATIENT
Start: 2022-12-03 | End: 2022-12-05 | Stop reason: HOSPADM

## 2022-12-03 RX ORDER — NICOTINE 21 MG/24HR
1 PATCH, TRANSDERMAL 24 HOURS TRANSDERMAL DAILY
Status: DISCONTINUED | OUTPATIENT
Start: 2022-12-03 | End: 2022-12-05 | Stop reason: HOSPADM

## 2022-12-03 RX ADMIN — ATORVASTATIN CALCIUM 40 MG: 40 TABLET, FILM COATED ORAL at 21:11

## 2022-12-03 RX ADMIN — RISPERIDONE 1 MG: 1 TABLET ORAL at 21:11

## 2022-12-03 RX ADMIN — DICYCLOMINE HYDROCHLORIDE 10 MG: 10 CAPSULE ORAL at 11:24

## 2022-12-03 RX ADMIN — DICYCLOMINE HYDROCHLORIDE 10 MG: 10 CAPSULE ORAL at 08:17

## 2022-12-03 RX ADMIN — DICYCLOMINE HYDROCHLORIDE 10 MG: 10 CAPSULE ORAL at 21:11

## 2022-12-03 RX ADMIN — DICYCLOMINE HYDROCHLORIDE 10 MG: 10 CAPSULE ORAL at 16:45

## 2022-12-03 RX ADMIN — TRAZODONE HYDROCHLORIDE 50 MG: 50 TABLET ORAL at 21:11

## 2022-12-03 RX ADMIN — AMLODIPINE BESYLATE 10 MG: 10 TABLET ORAL at 13:53

## 2022-12-03 RX ADMIN — PANTOPRAZOLE SODIUM 20 MG: 20 TABLET, DELAYED RELEASE ORAL at 08:21

## 2022-12-03 ASSESSMENT — PAIN DESCRIPTION - ORIENTATION: ORIENTATION: OTHER (COMMENT)

## 2022-12-03 ASSESSMENT — PAIN SCALES - GENERAL
PAINLEVEL_OUTOF10: 0

## 2022-12-03 ASSESSMENT — PAIN DESCRIPTION - LOCATION: LOCATION: ABDOMEN

## 2022-12-03 ASSESSMENT — ENCOUNTER SYMPTOMS
GASTROINTESTINAL NEGATIVE: 1
RESPIRATORY NEGATIVE: 1

## 2022-12-03 ASSESSMENT — PAIN - FUNCTIONAL ASSESSMENT: PAIN_FUNCTIONAL_ASSESSMENT: ACTIVITIES ARE NOT PREVENTED

## 2022-12-03 ASSESSMENT — PAIN DESCRIPTION - DESCRIPTORS: DESCRIPTORS: CRAMPING

## 2022-12-03 NOTE — PROGRESS NOTES
SW met with patient to complete psychosocial and lifetime CSSR-S on this date. Patients long and short-term goals discussed. Patient voiced understanding. Treatment plan sheet signed. Patient verbalized understanding of the treatment plan. Patient participated in goals and objectives of the treatment plan. Patient discussed safety plan with . In the last 6 months has the patient been a danger to self: No  In the last 6 months has the patient been a danger to others: No  Legal Guardian/POA: No    Provided patient with [employer] handout entitled \"Quitting Smoking. \"  Reviewed handout with patient: addressing dangers of smoking, developing coping skills, and providing basic information about quitting. Patient received all components practical counseling of tobacco practical counseling during the hospital stay.

## 2022-12-03 NOTE — H&P
HISTORY AND PHYSICAL    Manuel Burgess is a 45 y.o. -American female admitted through ED for delusional thoughts. She was found walking away from home. When she arrived to ED she reported that she was following the wheel of guide and that she had been receiving text messages from God and that no one believes her. She admitted to ED staff that she is under a lot of stress and strain. She denied any SI or HI. HISTORY:    Patient Active Problem List   Diagnosis    GERD (gastroesophageal reflux disease)    Hyperlipidemia    Asthma    Atherosclerosis of native artery of extremity with intermittent claudication (HCC)    Abdominal pain    Hypertension    Nausea    Hypertriglyceridemia    Obesity (BMI 30-39. 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, with behavioral disturbance    Psychosis, unspecified psychosis type [facility])       Past Medical History:   Diagnosis Date    Alcohol abuse, episodic drinking behavior 6/21/2021    Asthma     Atherosclerosis of native arteries of the extremities with intermittent claudication 1/24/2013    GERD (gastroesophageal reflux disease)     HTN (hypertension)     Hyperlipidemia     Hypertension 6/21/2021    Obesity (BMI 30-39.9) 6/21/2021       Family History   Problem Relation Age of Onset    Cancer Maternal Aunt     Diabetes Maternal Uncle     Diabetes Maternal Grandmother     Cancer Maternal Grandfather        Social History     Socioeconomic History    Marital status: Legally      Spouse name: Not on file    Number of children: Not on file    Years of education: Not on file    Highest education level: Not on file   Occupational History    Not on file   Tobacco Use    Smoking status: Every Day     Packs/day: 1.50     Years: 30.00     Pack years: 45.00     Types: Cigarettes    Smokeless tobacco: Never   Vaping Use    Vaping Use: Never used Substance and Sexual Activity    Alcohol use: Not Currently     Alcohol/week: 3.0 standard drinks     Types: 3 Shots of liquor per week     Comment: occ    Drug use: Yes     Frequency: 10.0 times per week     Types: Marijuana [place])    Sexual activity: Not Currently   Other Topics Concern    Not on file   Social History Narrative    Not on file     Social Determinants of Health     Financial Resource Strain: Not on file   Food Insecurity: Not on file   Transportation Needs: Not on file   Physical Activity: Not on file   Stress: Not on file   Social Connections: Not on file   Intimate Partner Violence: Not on file   Housing Stability: Not on file       Prior to Admission medications    Not on File         Current Facility-Administered Medications:     risperiDONE (RISPERDAL) tablet 1 mg, 1 mg, Oral, Nightly, Misti Benavides MD    nicotine (NICODERM CQ) 21 MG/24HR 1 patch, 1 patch, TransDERmal, Daily, Misti Benavides MD    traZODone (DESYREL) tablet 50 mg, 50 mg, Oral, Nightly, Misti Benavides MD, 50 mg at 12/02/22 2119    traZODone (DESYREL) tablet 50 mg, 50 mg, Oral, Nightly PRN, Misti Benavides MD    hydrOXYzine HCl (ATARAX) tablet 25 mg, 25 mg, Oral, TID PRN, Misti Benavides MD    dicyclomine (BENTYL) capsule 10 mg, 10 mg, Oral, 4x Daily AC & HS, Misti Benavides MD, 10 mg at 12/03/22 0817    atorvastatin (LIPITOR) tablet 40 mg, 40 mg, Oral, Nightly, Misti Benavides MD, 40 mg at 12/02/22 2119    amLODIPine (NORVASC) tablet 10 mg, 10 mg, Oral, Daily, Misti Benavides MD, 10 mg at 12/02/22 1523    pantoprazole (PROTONIX) tablet 20 mg, 20 mg, Oral, QAM AC, Misti Benavides MD, 20 mg at 12/03/22 9651    acetaminophen (TYLENOL) tablet 650 mg, 650 mg, Oral, Q4H PRN, Misti Benavides MD    polyethylene glycol (GLYCOLAX) packet 17 g, 17 g, Oral, Daily PRN, Misti Benavides MD    Pcn [penicillins]    REVIEW OF SYSTEMS:    Review of Systems   Constitutional: Negative. HENT: Negative. Respiratory: Negative.      Cardiovascular: Negative. Gastrointestinal: Negative. Genitourinary: Negative. Musculoskeletal: Negative. Skin: Negative. Neurological: Negative. Psychiatric/Behavioral:  Positive for behavioral problems. PHYSICAL EXAM:    /87   Pulse 97   Temp 97.7 °F (36.5 °C) (Temporal)   Resp 20   Ht 5' 6\" (1.676 m)   Wt 180 lb (81.6 kg)   LMP 01/30/2019   SpO2 99%   BMI 29.05 kg/m²     Physical Exam  Vitals reviewed. Constitutional:       Appearance: Normal appearance. HENT:      Head: Normocephalic and atraumatic. Mouth/Throat:      Mouth: Mucous membranes are moist.      Pharynx: Oropharynx is clear. Eyes:      Extraocular Movements: Extraocular movements intact. Conjunctiva/sclera: Conjunctivae normal.      Pupils: Pupils are equal, round, and reactive to light. Cardiovascular:      Rate and Rhythm: Normal rate and regular rhythm. Pulses: Normal pulses. Heart sounds: Normal heart sounds. Pulmonary:      Effort: Pulmonary effort is normal. No respiratory distress. Breath sounds: Normal breath sounds. Abdominal:      General: Abdomen is flat. Palpations: Abdomen is soft. Musculoskeletal:         General: Normal range of motion. Cervical back: Normal range of motion and neck supple. Skin:     General: Skin is warm and dry. Capillary Refill: Capillary refill takes less than 2 seconds. Neurological:      General: No focal deficit present. Mental Status: She is alert and oriented to person, place, and time. Cranial Nerves: No cranial nerve deficit. Psychiatric:         Mood and Affect: Mood normal.         Behavior: Behavior normal.         Thought Content:  Thought content normal.         Judgment: Judgment normal.       Recent Results (from the past 24 hour(s))   COVID-19, Rapid    Collection Time: 12/02/22  9:55 AM    Specimen: Nasopharyngeal Swab   Result Value Ref Range    SARS-CoV-2, NAAT Not Detected Not Detected ASSESSMENT:  Delusional disorder  Hypertension    PLAN:    She is admitted to [facility]. Lab results and home meds reviewed. VSS. Continue plan of care per psych. Alter treatment plan as needed.         Andree Bustos, APRN,   12/3/2022

## 2022-12-03 NOTE — PROGRESS NOTES
Behavioral Services  Medicare Certification Upon Admission    I certify that this patient's inpatient psychiatric hospital admission is medically necessary for:    [x] (1) Treatment which could reasonably be expected to improve this patient's condition,       [x] (2) Or for diagnostic study;     AND     [x](2) The inpatient psychiatric services are provided while the individual is under the care of a physician and are included in the individualized plan of care.     Estimated length of stay/service 3 - 5 days    Plan for post-hospital care TBD    Electronically signed by Grisel Mast MD on 12/3/2022 at 8:31 AM

## 2022-12-03 NOTE — PROGRESS NOTES
[facility] Adult Unit Daily Assessment  Nursing Progress Note    Room: 06/606-01   Name: Darlene Lopez   Age: 45 y.o. 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, with behavioral disturbance  Precautions: suicide risk  Inpatient Status: involuntary       SLEEP:  Sleep Quality Good  Sleep Medications: Yes   PRN Sleep Meds: No       MEDICAL:  Other PRN Meds: No   Med Compliant: Yes  Accu-Chek: No  Oxygen/CPAP/BiPAP: No  CIWA/CINA: No   PAIN Assessment: none  Side Effects from medication: No      Metabolic Screening:  No results found for: LABA1C  Lab Results   Component Value Date    CHOL 323 (H) 06/21/2021     Lab Results   Component Value Date    TRIG 324 (H) 06/21/2021     Lab Results   Component Value Date    HDL 46 (L) 06/21/2021     No components found for: LDLCAL  No results found for: LABVLDL  Body mass index is 29.05 kg/m². BP Readings from Last 2 Encounters:   12/02/22 127/82   06/22/21 125/83         Medical Bed:   Is patient in a medical bed? no   If medical bed is in use, has nursing secured room while patient is awake and out of the room? NA  Has safety checks by nursing been completed on the bed/room this shift? yes    Protective Factors:  Patient identifies protective factors with nursing staff as follows: Identifies reasons for living: Yes   Supportive Social Network or family: Yes    Belief that suicide is immoral/high spirituality: Yes   Responsibility to family or others/living with family: Yes   Fear of death or dying due to pain and suffering: Yes   Engaged in work or school: No     If Patient is unable to identify, reason why? PSYCH:  Depression: 0   Anxiety: 0   SI denies suicidal ideation   Risk of Suicide: No Risk  HI Negative for homicidal ideation      AVH:yes If Hallucinations are present, describe? Pt believes someone was taking pictures of her on her phone while they were telling her the places she needed to go too.         GENERAL:  Appetite: Pt reports no change from normal Percent Meals: ALISSA   Social: No   Speech: normal   Appearance: appropriately dressed, disheveled, and healthy looking    GROUP:  Group Participation: No  Participation Quality: None    Notes: Pt was in her room resting during and prior to this assessment. Pt is pleasant and cooperative. Pt states this started when her daughter threw her out of her house ,when the pt had given  her apt.because her daughter ask her to move in with her. Pt has started watching [employer] and going to a new Religion the patient stopped doing almost everything except going to Religion reading the bible and watching [address]. Then she got the messages to start going to different places ,she thought the messages were from god. Pt reported she had seen someone in [place] but did not feel like the medications were right,she was supposed to see someone else but does not remember. Will continue to monitor.

## 2022-12-03 NOTE — PROGRESS NOTES
Group Note    Date: 12/03/22  Start Time: 9:30 AM   End Time:10:00 AM     Number of Participants: 6    Type of Group: Community/Goal     Patient's Goal:  resting    Notes:      Status After Intervention:      Participation Level:  Active Listener    Participation Quality: Appropriate    Speech:  normal    Thought Process/Content: Logical    Mood:  calm    Level of consciousness:  Alert    Response to Learning: Able to verbalize current knowledge/experience    Modes of Intervention: Education and Support    Discipline Responsible: Behavioral Health Technician     Signature:  Freddie Llanos

## 2022-12-03 NOTE — PROGRESS NOTES
Admission Note      Reason for admission/Target Symptom: Per nursing admission assessment - Reason for Admission: Pennie David is a 45 yr old female who came to the ED , \"My daughter put me out last month, I've been staying with my son. I've been getting all these messages on my phone and they are from God. Last night I got a message to go to [facility] and then I had to keep walking all around. \"     Patient seen in ED exam room 18 lying on bed. Patient is dressed in paper scrubs, has one-to-one sitter at bedside, patient appears anxious, is restless and fidgety, keeps getting up from the bed and walking around room attempting to show writer images and messages on her cell phone. She reports history of anxiety and depression, has not taken medications \"in awhile\". Reports history of polysubstance use including marijuana and cocaine, however patient reports has not used \"in awhile, I found God\". She reports she has been getting multiple messages on her phone all day, everyday and many of the messages are from God. Last night, she was found by police on [address] [place] [address] staring up at that eagle and when police questioned her she stated \"you don't see him? It's God and it's the end of the world\". Patient denies suicidal ideations and homicidal ideations. Reports \"seeing God a few times holding his stick\". Denies auditory hallucinations. Patient is paranoid that someone has been recording her and taking pictures of her on her own phone. Denies alcohol use currently, reports alcohol abuse as recent as a couple months ago. Denies substance abuse currently. Denies history of suicide attempts and denies history of psychiatric hospitalizations. Diagnoses: Psychosis, unspecified type; Insomnia    UDS: Negative    BAL: Negative <10    SW will meet with treatment team to discuss patient's treatment including care planning, discharge planning, and follow-up needs. Patient has been admitted to [facility]. Treatment team will identify the patient's discharge needs. Appointments will be made for medication management and outpatient therapy/counseling. Pt confirmed the need for ongoing treatment post inpatient stay. Pt was also provided a handout of contact information for drug and alcohol treatment centers and other community support service such as [facility], AA, and [facility].

## 2022-12-03 NOTE — PLAN OF CARE
Problem: Anxiety  Goal: Will report anxiety at manageable levels  Description: INTERVENTIONS:  1. Administer medication as ordered  2. Teach and rehearse alternative coping skills  3. Provide emotional support with 1:1 interaction with staff  Outcome: Progressing     Problem: Confusion  Goal: Confusion, delirium, dementia, or psychosis is improved or at baseline  Description: INTERVENTIONS:  1. Assess for possible contributors to thought disturbance, including medications, impaired vision or hearing, underlying metabolic abnormalities, dehydration, psychiatric diagnoses, and notify attending LIP  2. [place] high risk fall precautions, as indicated  3. Provide frequent short contacts to provide reality reorientation, refocusing and direction  4. Decrease environmental stimuli, including noise as appropriate  5. Monitor and intervene to maintain adequate nutrition, hydration, elimination, sleep and activity  6. If unable to ensure safety without constant attention obtain sitter and review sitter guidelines with assigned personnel  7. Initiate Psychosocial CNS and Spiritual Care consult, as indicated  Outcome: Progressing     Problem: Behavior  Goal: Pt/Family maintain appropriate behavior and adhere to behavioral management agreement, if implemented  Description: INTERVENTIONS:  1. Assess patient/family's coping skills and  non-compliant behavior (including use of illegal substances)  2. Notify security of behavior or suspected illegal substances which indicate the need for search of the family and/or belongings  3. Encourage verbalization of thoughts and concerns in a socially appropriate manner  4. Utilize positive, consistent limit setting strategies supporting safety of patient, staff and others  5. Encourage participation in the decision making process about the behavioral management agreement  6. If a visitor's behavior poses a threat to safety call refer to organization policy.   7. Initiate consult with , Psychosocial CNS, Spiritual Care as appropriate  Outcome: Progressing     Problem: Involuntary Admit  Goal: Will cooperate with staff recommendations and doctor's orders and will demonstrate appropriate behavior  Description: INTERVENTIONS:  1. Treat underlying conditions and offer medication as ordered  2. Educate regarding involuntary admission procedures and rules  3.  Contain excessive/inappropriate behavior per unit and hospital policies  Outcome: Progressing

## 2022-12-03 NOTE — GROUP NOTE
Group Therapy Note    Date: 12/3/2022    Group Start Time: 1430  Group End Time: 1500  Group Topic: Nursing    [facility]    Wu Mendez RN    Group Therapy Note                                                                    Group Note    Date: 12/03/22  Start Time: 2:30 PM   End Time:3:00 PM     Number of Participants: 6    Type of Group: Nursing Education     Patient's Goal:  Identify a coping mechanism for depression    Notes: get out walk, stay off that phone, try help out where I can (if people need help like at churches cleaning or help out at shelters for those homeless, help out at the kitchen if they need help), may exercise some      Status After Intervention:  Improved    Participation Level:  Active Listener    Participation Quality: Appropriate    Speech:  normal    Thought Process/Content: Linear    Mood:  calm and cooperative    Level of consciousness:  Alert    Response to Learning: Able to verbalize/acknowledge new learning    Modes of Intervention: Education and Support    Discipline Responsible: Registered Nurse     Signature:  Wu Mendez RN   Electronically signed by Wu Mendez RN on 12/3/2022 at 4:02 PM

## 2022-12-03 NOTE — PROGRESS NOTES
[facility] Daily Shift Assessment-Geriatric Unit  Nursing Progress Note          Room: [facility]606-01   Name: Chrissy Hart   Age: 45 y.o. 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 no   If medical bed is in use, has nursing secured room while patient is awake and out of the room? Has safety checks by nursing been completed on the bed/room this shift? Protective Factors:  Patient identifies protective factors with nursing staff as follows: Identifies reasons for living: Yes   Supportive Social Network or family: Yes    Belief that suicide is immoral/high spirituality: Yes   Responsibility to family or others/living with family: Yes   Fear of death or dying due to pain and suffering: Yes   Engaged in work or school: No     If Patient is unable to identify, reason why? Metabolic Screening:  No results found for: LABA1C  Lab Results   Component Value Date    CHOL 323 (H) 06/21/2021     Lab Results   Component Value Date    TRIG 324 (H) 06/21/2021     Lab Results   Component Value Date    HDL 46 (L) 06/21/2021     No components found for: LDLCAL  No results found for: LABVLDL  Body mass index is 29.05 kg/m². BP Readings from Last 2 Encounters:   12/03/22 122/87   06/22/21 125/83         PSYCH:   SI denies suicidal ideation    Risk of Suicide: No Risk   HI Negative for homicidal ideation        AVH:no If Hallucinations are present, describe?    Depression: 1 Anxiety: \"good\"       GENERAL:  Appetite: good  Percent Meals: %   Social: Yes Speech: normal   Appearance:appropriately dressed  Assistive Devices: noneLevel of Assist: Independent      GROUP:  Group Participation: Yes  Participation LevelActive Listener    Participation QualityAppropriate    Notes: Pt is alert and oriented x 4, cooperative and friendly. Brightened affect, mood congruent. Circumstantial thought processes. Concentration impaired. Memory intact. Limited insight and judgment. Pt social with staff and peers. Rates depression 1, states anxiety is \"good\". Denies SI, HI, and AVH. Will continue to monitor pt.      Electronically signed by Cleveland Soto RN on 12/3/22 at 12:25 PM CST

## 2022-12-03 NOTE — GROUP NOTE
Group Therapy Note    Date: 12/3/2022    Group Start Time: 1530  Group End Time: 1600  Group Topic: Healthy Living/Wellness    [facility]    Estela Shoemaker; Jc Watson RN    Group Therapy Note                                                                    Group Note    Date: 12/03/22  Start Time: 3:30 PM   End Time:4:00 PM     Number of Participants: 6    Type of Group: Health Living/Wellness     Patient's Goal:  Identify leisure activities for wellness    Notes:      Status After Intervention:  Improved    Participation Level:  Active Listener    Participation Quality: Appropriate    Speech:  normal    Thought Process/Content: Linear    Mood:  calm and cooperative    Level of consciousness:  Alert    Response to Learning: Able to verbalize/acknowledge new learning    Modes of Intervention: Education and Support    Discipline Responsible: Behavioral Health Technician     Signature:  Jc Watson RN   Electronically signed by Jc Watson RN on 12/3/2022 at 4:30 PM

## 2022-12-03 NOTE — H&P
Department of Psychiatry  Attending History and Physical - Adult         CHIEF COMPLAINT: Psychosis    History obtained from:  patient    HISTORY OF PRESENT ILLNESS:          The patient is a 45 y.o. female with no reported previous psychiatric history, who has been admitted to our psychiatric unit secondary to paranoid ideations. Patient's UDS in ER was negative, blood alcohol level less than 10. Patient has been seen in treatment team room. She reported that she has been never diagnosed with any mental health illnesses, however, she stated that she experienced episodes of depression in the past \"when I have stresses with my family\". She reported that she has a son and daughter, stated that she has a good relationship with her son, however, she and her daughter always involved in arguments. Patient reported that yesterday she had arguments with her daughter and walked away from the house, and later was picked up from the street by police, who brought patient to the hospital for evaluation. Patient stated that her daughter became upset after she stated that she received text messages from the God, said that nobody believed her. Also, patient reported that somebody is following her, recording on cell phone everything she said and taking pictures of her. Patient reported that 2 months ago she made the decision to stop drinking alcohol and using drugs, stated that she was using crack cocaine, because \"I found God. I wanted to make changes in my life and serve a God\". She denies using any illicit substances during the last 2 months. Today during the interview patient denies feeling of depression, anxiety, stated that she was experiencing decreased quality of sleep during the last week, however, slept very well during the last night in the hospital and felt rested well, when she woke up in the morning. Patient denies feeling of hopelessness, helplessness and worthlessness.   She endorses good appetite, good energy level and good motivation. Patient denies current active suicidal or homicidal ideations, denies any plans. She denies auditory and visual hallucinations. Patient continues to endorse paranoid ideations, as described above. PSYCHIATRIC HISTORY:    Diagnoses: Denies  Suicide attempts/gestures: Denies   Prior hospitalizations: Denies   Medication trials: Denies   Mental health contact: Denies  Head trauma: Denies     Past Medical History:        Diagnosis Date    Alcohol abuse, episodic drinking behavior 2021    Asthma     Atherosclerosis of native arteries of the extremities with intermittent claudication 2013    GERD (gastroesophageal reflux disease)     HTN (hypertension)     Hyperlipidemia     Hypertension 2021    Obesity (BMI 30-39.9) 2021     Past Surgical History:        Procedure Laterality Date     SECTION      CHOLECYSTECTOMY, LAPAROSCOPIC N/A 2021    CHOLECYSTECTOMY LAPAROSCOPIC performed by Lori Anthony MD at [address] [place] [address]     Medications Prior to Admission:   No medications prior to admission. Allergies:  Pcn [penicillins]    Social History:  Smoking-1 PPD  Alcohol-reported that she was drinking alcohol \"socially\", however, did not drink alcohol during the last 2 months. Illicit substances-reported that she has been smoking marijuana during the teenagers years and has been using cocaine \"recreationally\", stated that she stopped using cocaine 2 months ago. Family History:       Problem Relation Age of Onset    Cancer Maternal Aunt     Diabetes Maternal Uncle     Diabetes Maternal Grandmother     Cancer Maternal Grandfather      Psychiatric Family History  Patient reported that her mother has been diagnosed with Alzheimer's disorder. There is no family history of attempted or completed suicide. REVIEW OF SYSTEMS:  General: Endorses decreased quality of sleep no fevers, chills, night sweats, no recent weight loss or gain.   Head: No headache, no migraine. Eyes: No recent visual changes. Ears: No recent hearing changes. Nose: No increased congestion or change in sense of smell. Throat: No sore throat, no trouble swallowing. Cardiovascular: Endorses history of hypertension. No chest pain or palpitations, or dizziness. Respiratory: Endorses history of asthma. No cough, wheezes, congestion, or shortness of breath. Gastrointestinal: Endorses history of GERD. No abdominal pain, nausea or vomiting, no diarrhea or constipation. Musculo-skeletal: No edema, deformities, or loss of functions. Neurological: No loss of consciousness, abnormal sensations, or weakness. Skin: No rash, abrasions or bruises. PHYSICAL EXAM:    Vitals:  /82   Pulse 84   Temp 97.5 °F (36.4 °C) (Temporal)   Resp 20   Ht 5' 6\" (1.676 m)   Wt 180 lb (81.6 kg)   LMP 01/30/2019   SpO2 97%   BMI 29.05 kg/m²     Mental Status Examination:    Appearance: Poorly groomed, wearing hospital attire. Made good eye contact. Behavior: Calm, cooperative with interview, socially appropriate. No psychomotor retardation or agitation appreciated. Gait unremarkable. Speech: Normal in tone, volume and quality. No pressured speech noted. .   Mood: \"I am doing good\"   Affect: Mood congruent. Range is full  Thought Process: Mostly circumstantial, sometimes linear and goal oriented. Thought Content: Patient does not have any current active suicidal and homicidal ideations. Positive for presence of paranoid ideations. Perceptions: Seems patient does not have any auditory or visual hallucinations at present time. Patient did not appear to be responding to internal stimuli. Executive Functions: Appear mildly impaired. Concentration: Slightly decreased  Reasoning: Appears impaired based on interaction from interview   Orientation: to person, place, date, and situation. Alert. Language: Intact. Fund of information: Intact. Memory: recent and remote appear intact. Impulsivity: Questionable  Neurovegitative: Fair appetite, decreased sleep  Insight: Limited  Judgment: Limited       Physical Exam:  GENERAL APPEARANCE: 45y.o. year-old female in NAD   HEAD: Normocephalic, atraumatic. THROAT: No erythema, exudates, lesions. No tongue laceration. CARDIOVASCULAR: PMI nondisplaced. Regular rhythm and rate. Normal S1 and S2.  PULMONARY: Clear to auscultation bilaterally, no tenderness to palpation. ABDOMEN: Soft, nontender, nondistended. MUSCULOSKELTAL: No obvious deformities, clubbing, cyanosis or edema, no spinous process or paraspinous tenderness, normal ROM, normal gait, distal pulses intact symmetric 1+ bilaterally. NEUROLOGICAL: Alert, oriented x 4, CN II-XII grossly intact, motor strength 3-4/5 all muscle groups, DTR 1+ intact and symmetric, sensation intact to sharp and dull. No abnormal movements or tremors.    SKIN: Warm, dry, intact, no rash, abrasions or bruises     DATA:  Labs:  CBC with Differential:    Lab Results   Component Value Date/Time    WBC 12.1 12/02/2022 08:45 AM    RBC 5.39 12/02/2022 08:45 AM    HGB 14.9 12/02/2022 08:45 AM    HCT 46.0 12/02/2022 08:45 AM     12/02/2022 08:45 AM    MCV 85.3 12/02/2022 08:45 AM    MCH 27.6 12/02/2022 08:45 AM    MCHC 32.4 12/02/2022 08:45 AM    RDW 15.5 12/02/2022 08:45 AM    LYMPHOPCT 17.7 12/02/2022 08:45 AM    MONOPCT 7.4 12/02/2022 08:45 AM    BASOPCT 0.3 12/02/2022 08:45 AM    MONOSABS 0.90 12/02/2022 08:45 AM    LYMPHSABS 2.1 12/02/2022 08:45 AM    EOSABS 0.10 12/02/2022 08:45 AM    BASOSABS 0.00 12/02/2022 08:45 AM     CMP:    Lab Results   Component Value Date/Time     12/02/2022 08:45 AM    K 3.6 12/02/2022 08:45 AM    K 4.2 11/24/2020 10:38 AM     12/02/2022 08:45 AM    CO2 24 12/02/2022 08:45 AM    BUN 15 12/02/2022 08:45 AM    CREATININE 0.9 12/02/2022 08:45 AM    GFRAA >59 06/22/2021 03:36 AM    LABGLOM >60 12/02/2022 08:45 AM    GLUCOSE 113 12/02/2022 08:45 AM    PROT 8.2 12/02/2022 08:45 AM    LABALBU 4.4 12/02/2022 08:45 AM    CALCIUM 10.4 12/02/2022 08:45 AM    BILITOT 0.6 12/02/2022 08:45 AM    ALKPHOS 87 12/02/2022 08:45 AM    AST 22 12/02/2022 08:45 AM    ALT 18 12/02/2022 08:45 AM       DSM 5 DIAGNOSIS:  Psychosis, unspecified  Insomnia    Medical conditions pertinent to the patient's mental health  Hypertension  Hyperlipidemia  Asthma  GERD  Obesity  Hypertriglyceridemia      Plan:   -Admit to [facility] adult Unit and monitor on 15 minute checks  -[place] reviewed. -Gather collateral information from family with release  -Medical monitoring to be performed by Dr. Teresa Nayak and associates  -Acclimate to the unit. -Encourage participation in groups and therapeutic activities as appropriate.  -Medications: Will restart patient's home medications as previously prescribed and recommended dose. Will start patient on Risperdal 1 mg at bedtime for psychosis, trazodone 50 mg at bedtime for insomnia.   Hydroxyzine 25 mg 3 times a day as needed for anxiety  The patient will be provided with nicotine patch 21 mg / 24 hours for nicotine replacement      -The risks, benefits, side effects, indications, contraindications, and adverse effects of the medications have been discussed.  -The patient has verbalized understanding and has capacity to give informed consent.  -SW help evaluating home environment.   -Discuss with treatment team.

## 2022-12-03 NOTE — PROGRESS NOTES
Comprehensive Nutrition Assessment    Type and Reason for Visit:  Initial, Positive Nutrition Screen    Nutrition Recommendations/Plan:   Continue current POC     Malnutrition Assessment:  Malnutrition Status:  No malnutrition (12/03/22 1252)    Context:  Acute Illness     Findings of the 6 clinical characteristics of malnutrition:  Energy Intake:  No significant decrease in energy intake  Weight Loss:  Unable to assess     Body Fat Loss:  No significant body fat loss     Muscle Mass Loss:  No significant muscle mass loss    Fluid Accumulation:  No significant fluid accumulation     Strength:  Not Performed    Nutrition Assessment:    +NS for lacking severeal teeth. Pt appears adequately nourished and po intake is good with intake ranging %. Nutrition Related Findings:    adequately nourished Wound Type: None       Current Nutrition Intake & Therapies:    Average Meal Intake: %  Average Supplements Intake: None Ordered  ADULT DIET; Regular    Anthropometric Measures:  Height: 5' 6\" (167.6 cm)  Ideal Body Weight (IBW): 130 lbs (59 kg)    Admission Body Weight: 180 lb (81.6 kg)  Current Body Weight: 180 lb (81.6 kg), 138.5 % IBW. Current BMI (kg/m2): 29.1    BMI Categories: Overweight (BMI 25.0-29. 9)    Nutrition Diagnosis:   No nutrition diagnosis at this time related to   as evidenced by      Nutrition Interventions:   Food and/or Nutrient Delivery: Continue Current Diet  Nutrition Education/Counseling: No recommendation at this time  Coordination of Nutrition Care: Continue to monitor while inpatient       Goals:     Goals: Meet at least 75% of estimated needs, PO intake 50% or greater       Nutrition Monitoring and Evaluation:   Behavioral-Environmental Outcomes: None Identified  Food/Nutrient Intake Outcomes: Food and Nutrient Intake  Physical Signs/Symptoms Outcomes: Chewing or Swallowing, Weight    Discharge Planning:    Continue current diet     Radha Rios MS, RD, LD  Contact: [phone]

## 2022-12-04 LAB — URINE CULTURE, ROUTINE: NORMAL

## 2022-12-04 PROCEDURE — 6370000000 HC RX 637 (ALT 250 FOR IP): Performed by: PSYCHIATRY & NEUROLOGY

## 2022-12-04 PROCEDURE — 1240000000 HC EMOTIONAL WELLNESS R&B

## 2022-12-04 RX ADMIN — TRAZODONE HYDROCHLORIDE 50 MG: 50 TABLET ORAL at 21:11

## 2022-12-04 RX ADMIN — DICYCLOMINE HYDROCHLORIDE 10 MG: 10 CAPSULE ORAL at 06:37

## 2022-12-04 RX ADMIN — PANTOPRAZOLE SODIUM 20 MG: 20 TABLET, DELAYED RELEASE ORAL at 06:37

## 2022-12-04 RX ADMIN — DICYCLOMINE HYDROCHLORIDE 10 MG: 10 CAPSULE ORAL at 21:11

## 2022-12-04 RX ADMIN — ATORVASTATIN CALCIUM 40 MG: 40 TABLET, FILM COATED ORAL at 21:11

## 2022-12-04 RX ADMIN — RISPERIDONE 1 MG: 1 TABLET ORAL at 21:12

## 2022-12-04 RX ADMIN — DICYCLOMINE HYDROCHLORIDE 10 MG: 10 CAPSULE ORAL at 16:39

## 2022-12-04 RX ADMIN — AMLODIPINE BESYLATE 10 MG: 10 TABLET ORAL at 08:45

## 2022-12-04 RX ADMIN — DICYCLOMINE HYDROCHLORIDE 10 MG: 10 CAPSULE ORAL at 11:10

## 2022-12-04 NOTE — PROGRESS NOTES
Group Note    Date: 12/04/22  Start Time: 9:00 AM   End Time:9:30 AM     Number of Participants: 10    Type of Group: Community/Goal     Patient's Goal:  \"Cleaning, looking TV\"    Notes:      Status After Intervention:      Participation Level:  Active Listener    Participation Quality: Appropriate    Speech:  normal    Thought Process/Content: Logical    Mood:  Calm    Level of consciousness:  Alert    Response to Learning: Able to verbalize current knowledge/experience    Modes of Intervention: Education and Support    Discipline Responsible: Behavioral Health Technician     Signature:  Frankie Mcgregor

## 2022-12-04 NOTE — GROUP NOTE
Group Therapy Note    Date: 12/4/2022    Group Start Time: 3147  Group End Time: 1645  Group Topic: Psychoeducation    [facility] ADULT [facility]    Shaquille Loredo           Patient's Goal: Developing closer relationships    Notes: Pt talked about boundaries and the 3 types of boundaries that we set with people in our lives, Porus, Healthy, and Rigid. We discussed how boundaries are rules or limits we set with people and they reflect our values we have in life. Pt shared what relationships they need to set better boundaries in and how they might go about that. Status After Intervention:  Unchanged    Participation Level:  Active Listener and Interactive    Participation Quality: Appropriate, Attentive, and Sharing      Speech:  normal      Thought Process/Content: Logical      Affective Functioning: Congruent      Mood: euthymic      Level of consciousness:  Attentive      Response to Learning: Able to verbalize current knowledge/experience and Able to verbalize/acknowledge new learning      Endings: None Reported    Modes of Intervention: Support, Exploration, and Problem-solving      Discipline Responsible: Psychoeducational Specialist      Signature:  Shaquille Loredo

## 2022-12-04 NOTE — PROGRESS NOTES
[facility] Adult Unit Daily Assessment  Nursing Progress Note    Room: [facility]/606-01   Name: Tyler Carranza   Age: 45 y.o. 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, with behavioral disturbance  Precautions: suicide risk and elopement precautions   Inpatient Status: involuntary       SLEEP:  Sleep Quality Good  Sleep Medications: Yes trazodone 50 mg  PRN Sleep Meds: No       MEDICAL:  Other PRN Meds: No   Med Compliant: Yes  Accu-Chek: No  Oxygen/CPAP/BiPAP: No  CIWA/CINA: No   PAIN Assessment: none  Side Effects from medication: No      Metabolic Screening:  No results found for: LABA1C  Lab Results   Component Value Date    CHOL 323 (H) 06/21/2021     Lab Results   Component Value Date    TRIG 324 (H) 06/21/2021     Lab Results   Component Value Date    HDL 46 (L) 06/21/2021     No components found for: LDLCAL  No results found for: LABVLDL  Body mass index is 29.05 kg/m². BP Readings from Last 2 Encounters:   12/03/22 125/84   06/22/21 125/83         Medical Bed:   Is patient in a medical bed? no   If medical bed is in use, has nursing secured room while patient is awake and out of the room? NA  Has safety checks by nursing been completed on the bed/room this shift? yes    Protective Factors:  Patient identifies protective factors with nursing staff as follows: Identifies reasons for living: Yes   Supportive Social Network or family: Yes    Belief that suicide is immoral/high spirituality: Yes   Responsibility to family or others/living with family: Yes   Fear of death or dying due to pain and suffering: Yes   Engaged in work or school: No     If Patient is unable to identify, reason why? PSYCH:  Depression: 0   Anxiety: 0   SI denies suicidal ideation   Risk of Suicide: No Risk  HI Negative for homicidal ideation      AVH:no If Hallucinations are present, describe?          GENERAL:  Appetite: good   Percent Meals: 75%   Social: Yes   Speech: normal   Appearance: appropriately dressed and appropriately groomed    GROUP:  Group Participation: Yes  Participation Quality: Active Listener    Notes: Pt was in her room during this assessment. Pt is calm ,friendly , and cooperative. Pt is medication compliant ,attends group,performs ADL's,and is social with staff and peers. Pt has talked on the phone ,exercised,and participated in craft today. Pt denies SI,HI ,AVH,depression and anxiety. Will continue to monitor.

## 2022-12-04 NOTE — PROGRESS NOTES
[facility] Adult Unit Daily Assessment  Nursing Progress Note    Room: [facility]/606-01   Name: Linda Lindsay   Age: 45 y.o. 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, with behavioral disturbance  Precautions: close watch  Inpatient Status: involuntary       SLEEP:  Sleep Quality Good  Sleep Medications: Yes   PRN Sleep Meds: Yes       MEDICAL:  Other PRN Meds: Yes   Med Compliant: Yes  Accu-Chek: No  Oxygen/CPAP/BiPAP: No  CIWA/CINA: No   PAIN Assessment: none  Side Effects from medication: No      Metabolic Screening:  No results found for: LABA1C  Lab Results   Component Value Date    CHOL 323 (H) 06/21/2021     Lab Results   Component Value Date    TRIG 324 (H) 06/21/2021     Lab Results   Component Value Date    HDL 46 (L) 06/21/2021     No components found for: LDLCAL  No results found for: LABVLDL  Body mass index is 29.05 kg/m². BP Readings from Last 2 Encounters:   12/04/22 129/89   06/22/21 125/83         Medical Bed:   Is patient in a medical bed? NA   If medical bed is in use, has nursing secured room while patient is awake and out of the room? NA  Has safety checks by nursing been completed on the bed/room this shift? NA    Protective Factors:  Patient identifies protective factors with nursing staff as follows: Identifies reasons for living: Yes   Supportive Social Network or family: Yes    Belief that suicide is immoral/high spirituality: Yes   Responsibility to family or others/living with family: Yes   Fear of death or dying due to pain and suffering: Yes   Engaged in work or school: Yes     If Patient is unable to identify, reason why? PSYCH:  Depression: 0   Anxiety: 0   SI denies suicidal ideation   Risk of Suicide: No Risk  HI Negative for homicidal ideation      AVH:no If Hallucinations are present, describe?          GENERAL:  Appetite: no change from normal   Percent Meals: 100%   Social: Yes   Speech: normal   Appearance: appropriately dressed and healthy looking    GROUP:  Group Participation: Yes  Participation Quality: Active Listener and Interactive    Notes: Patient is brightened, calm, cooperative, and med compliant. Patient endorses no feelings of depression, anxiety, and is not having delusions. Patient is social and attends group. Patient has a good appetite as well. Patient hopes to be discharged tomorrow.        Electronically signed by Rajnider Escobar RN on 12/4/22 at 2:11 PM CST

## 2022-12-04 NOTE — RESEARCH
Sw attempted to call pt's son, Charley Salcido @[phone], to obtain collateral information about the pt, but it said \"the number you have dialed has been changed, disconnected, or is no longer in service.

## 2022-12-04 NOTE — PROGRESS NOTES
Group Therapy Note    Date: 12/04/22  Start Time: 1500  End Time:1530    Number of Participants: 9    Type of Group: recreation/open dialogue    Patient's Goal:      Notes:  patient participated    Status After Intervention:  Improved    Participation Level:  Active Listener and Interactive    Participation Quality: Appropriate, Attentive, and Sharing    Speech:  normal    Thought Process/Content: Logical    Affective Functioning: Congruent    Mood:  calm    Level of consciousness:  Alert and Oriented x4    Response to Learning: Progressing to goal    Modes of Intervention: Education and Support    Discipline Responsible: Registered Nurse    Signature:  Kieran Tripathi RN

## 2022-12-04 NOTE — PLAN OF CARE
Problem: Confusion  Goal: Confusion, delirium, dementia, or psychosis is improved or at baseline  Description: INTERVENTIONS:  1. Assess for possible contributors to thought disturbance, including medications, impaired vision or hearing, underlying metabolic abnormalities, dehydration, psychiatric diagnoses, and notify attending LIP  2. [place] high risk fall precautions, as indicated  3. Provide frequent short contacts to provide reality reorientation, refocusing and direction  4. Decrease environmental stimuli, including noise as appropriate  5. Monitor and intervene to maintain adequate nutrition, hydration, elimination, sleep and activity  6. If unable to ensure safety without constant attention obtain sitter and review sitter guidelines with assigned personnel  7. Initiate Psychosocial CNS and Spiritual Care consult, as indicated  Outcome: Progressing     Problem: Behavior  Goal: Pt/Family maintain appropriate behavior and adhere to behavioral management agreement, if implemented  Description: INTERVENTIONS:  1. Assess patient/family's coping skills and  non-compliant behavior (including use of illegal substances)  2. Notify security of behavior or suspected illegal substances which indicate the need for search of the family and/or belongings  3. Encourage verbalization of thoughts and concerns in a socially appropriate manner  4. Utilize positive, consistent limit setting strategies supporting safety of patient, staff and others  5. Encourage participation in the decision making process about the behavioral management agreement  6. If a visitor's behavior poses a threat to safety call refer to organization policy. 7. Initiate consult with , Psychosocial CNS, Spiritual Care as appropriate  Outcome: Progressing     Problem: Anxiety  Goal: Will report anxiety at manageable levels  Description: INTERVENTIONS:  1. Administer medication as ordered  2.  Teach and rehearse alternative coping skills  3.  Provide emotional support with 1:1 interaction with staff  Outcome: Progressing

## 2022-12-04 NOTE — PROGRESS NOTES
Pt refused to participate in group wrap-up tonight.      Electronically signed by Barb Gardner RN on 12/3/2022 at 11:31 PM

## 2022-12-05 VITALS
BODY MASS INDEX: 28.93 KG/M2 | RESPIRATION RATE: 16 BRPM | DIASTOLIC BLOOD PRESSURE: 90 MMHG | HEART RATE: 108 BPM | SYSTOLIC BLOOD PRESSURE: 150 MMHG | WEIGHT: 180 LBS | TEMPERATURE: 97.7 F | OXYGEN SATURATION: 97 % | HEIGHT: 66 IN

## 2022-12-05 PROBLEM — R74.8 ELEVATED LIPASE: Status: RESOLVED | Noted: 2021-06-21 | Resolved: 2022-12-05

## 2022-12-05 PROBLEM — F22 DELUSIONAL DISORDER (HCC): Status: RESOLVED | Noted: 2022-12-03 | Resolved: 2022-12-05

## 2022-12-05 PROBLEM — F10.10 ALCOHOL ABUSE, EPISODIC DRINKING BEHAVIOR: Status: RESOLVED | Noted: 2021-06-21 | Resolved: 2022-12-05

## 2022-12-05 PROBLEM — F03.918: Status: RESOLVED | Noted: 2022-12-02 | Resolved: 2022-12-05

## 2022-12-05 PROBLEM — G47.00 INSOMNIA, UNSPECIFIED: Chronic | Status: ACTIVE | Noted: 2022-12-05

## 2022-12-05 PROBLEM — R10.9 ABDOMINAL PAIN: Status: RESOLVED | Noted: 2021-06-21 | Resolved: 2022-12-05

## 2022-12-05 PROBLEM — R11.0 NAUSEA: Status: RESOLVED | Noted: 2021-06-21 | Resolved: 2022-12-05

## 2022-12-05 LAB
EKG P AXIS: 60 DEGREES
EKG P-R INTERVAL: 128 MS
EKG Q-T INTERVAL: 358 MS
EKG QRS DURATION: 88 MS
EKG QTC CALCULATION (BAZETT): 408 MS
EKG T AXIS: 15 DEGREES

## 2022-12-05 PROCEDURE — 6370000000 HC RX 637 (ALT 250 FOR IP): Performed by: PSYCHIATRY & NEUROLOGY

## 2022-12-05 PROCEDURE — 5130000000 HC BRIDGE APPOINTMENT

## 2022-12-05 RX ORDER — AMLODIPINE BESYLATE 10 MG/1
10 TABLET ORAL DAILY
Qty: 30 TABLET | Refills: 1 | Status: SHIPPED | OUTPATIENT
Start: 2022-12-06 | End: 2023-01-05

## 2022-12-05 RX ORDER — ATORVASTATIN CALCIUM 40 MG/1
40 TABLET, FILM COATED ORAL NIGHTLY
Qty: 30 TABLET | Refills: 1 | Status: SHIPPED | OUTPATIENT
Start: 2022-12-05 | End: 2023-01-04

## 2022-12-05 RX ORDER — TRAZODONE HYDROCHLORIDE 50 MG/1
50 TABLET ORAL NIGHTLY
Qty: 30 TABLET | Refills: 1 | Status: SHIPPED | OUTPATIENT
Start: 2022-12-05 | End: 2023-01-04

## 2022-12-05 RX ORDER — RISPERIDONE 1 MG/1
1 TABLET ORAL NIGHTLY
Qty: 30 TABLET | Refills: 1 | Status: SHIPPED | OUTPATIENT
Start: 2022-12-05 | End: 2023-01-04

## 2022-12-05 RX ORDER — PANTOPRAZOLE SODIUM 20 MG/1
20 TABLET, DELAYED RELEASE ORAL
Qty: 30 TABLET | Refills: 1 | Status: SHIPPED | OUTPATIENT
Start: 2022-12-06 | End: 2023-01-05

## 2022-12-05 RX ADMIN — DICYCLOMINE HYDROCHLORIDE 10 MG: 10 CAPSULE ORAL at 09:42

## 2022-12-05 RX ADMIN — AMLODIPINE BESYLATE 10 MG: 10 TABLET ORAL at 09:42

## 2022-12-05 RX ADMIN — PANTOPRAZOLE SODIUM 20 MG: 20 TABLET, DELAYED RELEASE ORAL at 09:41

## 2022-12-05 RX ADMIN — DICYCLOMINE HYDROCHLORIDE 10 MG: 10 CAPSULE ORAL at 13:23

## 2022-12-05 ASSESSMENT — PAIN SCALES - GENERAL: PAINLEVEL_OUTOF10: 0

## 2022-12-05 NOTE — PROGRESS NOTES
Discharge Note     Patient is discharging on this date. Patient denies SI, HI, and AVH at this time. Patient reports improvement in behavior and is leaving unit in overall good condition. SW and patient discussed patient's follow up appointments and importance of attending appointments as scheduled, patient voiced understanding and agreement. Patient and SW also discussed patient's safety plan and patient was able to verbally identify: warning signs, coping strategies, places and people that help make the patient feel better/distract negative thoughts, friends/family/agencies/professionals the patient can reach out to in a crisis, and something that is important to the patient/worth living for. Patient was provided the national suicide prevention hotline number (7-360-508-318-859-0132) as well as local community behavioral health [facility]) crisis number for emergencies (7-653-080-865-821-5370). Discharge Disposition: home -lives with family      Pt to follow up with:  [address] on December 12 , 2022 at 1:00 PM for the intake appointment. Patient will follow up with [facility] and NICOLE Mendoza  On December 13 , 2022   at 10:00 AM for the medication management appointment.      Referral to outpatient tobacco cessation counseling treatment:  Patient refused referral to outpatient tobacco cessation counseling    SW offered to assist patient with transportation, patient declined transportation assistance

## 2022-12-05 NOTE — DISCHARGE INSTRUCTIONS
Medications:   Medication Summary Provided. I understand that I should take only the medications on my list.   --why and when I need to take each medication. --which side effects to watch for.   --that I should carry my medication information at all times in case of emergency    Situations. --I will take all medications until discontinued by physician. I will take all my medications to follow up appointments. --check with my physician or pharmacist before taking any new medication, over    the counter product or drink alcohol.   --ask about food, drug and dietary supplement interactions. --discard old lists and update records with medication providers. Behavior Health Follow Up:  Original Referral Source: ED  Discharge Diagnosis: [unfilled]  Recomendations for Level of Care: Follow Up  Patient Status at Discharge: Stable  [facility]  [facility]: [facility]  Aftercare plan faxed:    Faxed by: Social Work staff   Date: 22   Time:   Prescriptions sent with pt.  Scripts sent to her pharmacy    General Information:   Questions regarding your bill: Call [phone]   Suicide Hotline (Rescue Crisis) 5-361.721.6652   To obtain results of pending studies call Medical Records at: [phone]   For emergencies and 24 hour/7 days a week contact information: [phone]

## 2022-12-05 NOTE — PROGRESS NOTES
Group Note    Date: 12/05/22  Start Time: 8:00 AM   End Time:8:30 AM     Number of Participants: 11    Type of Group: Community/Goal     Patient's Goal:  '\"getting bills paid didn't do Friday watch tv today see if I can go out to [facility] see if they need help out there    Notes:      Status After Intervention:  Improved    Participation Level:  Active Listener    Participation Quality: Appropriate    Speech:  normal    Thought Process/Content: Logical    Mood:  calm    Level of consciousness:  Alert    Response to Learning: Able to verbalize current knowledge/experience    Modes of Intervention: Education and Support    Discipline Responsible: Behavioral Health Technician     Signature:  Dora Schaeffer

## 2022-12-05 NOTE — DISCHARGE SUMMARY
Discharge Summary     Patient ID:  Tyler Carranza  [id number]  44 y.o.  1970    Admit date: 12/2/2022  Discharge date: 12/5/2022    Admitting Physician: Pily Lubin MD   Attending Physician: Pily Lubin MD  Discharge Provider: Jose Alfredo Whitlock MD     Admission Diagnoses:   Psychosis, unspecified  Insomnia  Tobacco use disorder  Hypertension  Hyperlipidemia  Asthma  GERD  Obesity  Hypertriglyceridemia    Discharge Diagnoses:   Psychosis, unspecified  Insomnia  Hypertension  Hyperlipidemia  Asthma  GERD  Obesity  Hypertriglyceridemia    Admission Condition: poor    Discharged Condition: stable    Indication for Admission: psychosis    CHIEF COMPLAINT: Psychosis     History obtained from:  patient     HISTORY OF PRESENT ILLNESS (per Dr. Anais Daley): The patient is a 45 y.o. female with no reported previous psychiatric history, who has been admitted to our psychiatric unit secondary to paranoid ideations. Patient's UDS in ER was negative, blood alcohol level less than 10. Patient has been seen in treatment team room. She reported that she has been never diagnosed with any mental health illnesses, however, she stated that she experienced episodes of depression in the past \"when I have stresses with my family\". She reported that she has a son and daughter, stated that she has a good relationship with her son, however, she and her daughter always involved in arguments. Patient reported that yesterday she had arguments with her daughter and walked away from the house, and later was picked up from the street by police, who brought patient to the hospital for evaluation. Patient stated that her daughter became upset after she stated that she received text messages from the God, said that nobody believed her. Also, patient reported that somebody is following her, recording on cell phone everything she said and taking pictures of her.      Patient reported that 2 months ago she made the decision to stop drinking alcohol and using drugs, stated that she was using crack cocaine, because \"I found God. I wanted to make changes in my life and serve a God\". She denies using any illicit substances during the last 2 months. Today during the interview patient denies feeling of depression, anxiety, stated that she was experiencing decreased quality of sleep during the last week, however, slept very well during the last night in the hospital and felt rested well, when she woke up in the morning. Patient denies feeling of hopelessness, helplessness and worthlessness. She endorses good appetite, good energy level and good motivation. Patient denies current active suicidal or homicidal ideations, denies any plans. She denies auditory and visual hallucinations. Patient continues to endorse paranoid ideations, as described above. PSYCHIATRIC HISTORY:    Diagnoses: Denies  Suicide attempts/gestures: Denies   Prior hospitalizations: Denies   Medication trials: Denies   Mental health contact: Denies  Head trauma: Denies     Hospital Course:  Patient was admitted to the behavioral health floor and was acclimated to the unit. She was placed on suicide precautions. Labs were reviewed and physical exam was completed by Dr. Rupert Dobbins and associates. Home medications were reconciled. VERITO was obtained and reviewed. Psychotropics were adjusted - see below. Patient tolerated all the medications well and without side effects. With treatment, psychosis resolved, mood improved and affect brightened. Patient attended and participated in groups. All interactions with the peers and staff members were appropriate. Behaviorally, she was not a problem. Sleep and appetite improved. There was no evidence of suicidality, homicidality or psychosis. With the above-mentioned medications changes as well as psychotherapeutic interventions, patient reported considerable improvement in her condition and requested discharge home. It was felt that patient was at her baseline. Patient has no access to guns at home. There were no safety concerns per collateral.     On 12/5/22 it was therefore decided to discharge the patient, as it was felt that she received maximal benefit from her hospitalization. This patient is not suicidal, homicidal or psychotic at discharge. She does not present danger to self or others.        Number of antipsychotic medication prescribed at discharge: 1  IF MORE THAN ONE IS USED: NA    History of greater than 3 failed multiple monotherapy trials: NA  Monotherapy taper plan/ cross taper in progress: NA  Augmentation of Clozapine: NA    Referral to addiction treatment: patient refused    Prescription for Alcohol or Drug Disorder Medication: patient refused    Prescription for Tobacco Cessation medication: none    If no prescriptions for Tobacco Cessation must document why: patient refused    Consults: Internal medicine    Significant Diagnostic Studies:   Lab Results   Component Value Date    WBC 12.1 (H) 12/02/2022    HGB 14.9 12/02/2022    HCT 46.0 12/02/2022    MCV 85.3 12/02/2022     12/02/2022     Lab Results   Component Value Date     12/02/2022    K 3.6 12/02/2022     12/02/2022    CO2 24 12/02/2022    BUN 15 12/02/2022    CREATININE 0.9 12/02/2022    GLUCOSE 113 (H) 12/02/2022    CALCIUM 10.4 (H) 12/02/2022    PROT 8.2 12/02/2022    LABALBU 4.4 12/02/2022    BILITOT 0.6 12/02/2022    ALKPHOS 87 12/02/2022    AST 22 12/02/2022    ALT 18 12/02/2022    LABGLOM >60 12/02/2022    GFRAA >59 06/22/2021         No results found for: ODHSJHUL58  No results found for: VITD25  Lab Results   Component Value Date    CHOL 323 (H) 06/21/2021     Lab Results   Component Value Date    TRIG 324 (H) 06/21/2021     Lab Results   Component Value Date    HDL 46 (L) 06/21/2021     Lab Results   Component Value Date    LDLCALC 212 06/21/2021     No results found for: LABVLDL, VLDL  No results found for: CHOLHDLRATIO  No results found for: LABA1C  No results found for: TSHFT4, TSH, TSHREFLEX, WZL6VBJ    Treatments: RN and SW    Mental status examination at the time of discharge:  Alert, Oriented X 4  Appearance:  Improved Hygiene, smiling  Speech with Regular Rate and Rhythm  Eye Contact:  Good  No Psychomotor Agitation/Retardation Noted  Attitude:  Cooperative  Mood:  \"Good! Can't wait to see my son! \"  Affective: Congruent, appropriate to the situation, with a normal range and intensity  Thought Processes:  Coherently communicated, logical and goal oriented  Thought Content:  No Suicidal Ideation, No Homicidal Ideation, No Auditory or Visual Hallucinations, NO Overt Delusions  Insight: Improved  Judgement: Improved  Memory is intact for both remote and recent  Intellectual Functioning:  Within the [address] of Knowledge:  Adequate  Attention and Concentration:  Adequate    Discharge Exam:  Please, see medical note    Disposition: home    Patient Instructions:   Current Discharge Medication List        START taking these medications    Details   risperiDONE (RISPERDAL) 1 MG tablet Take 1 tablet by mouth nightly  Qty: 30 tablet, Refills: 1      traZODone (DESYREL) 50 MG tablet Take 1 tablet by mouth nightly  Qty: 30 tablet, Refills: 1           CONTINUE these medications which have CHANGED    Details   amLODIPine (NORVASC) 10 MG tablet Take 1 tablet by mouth daily  Qty: 30 tablet, Refills: 1      atorvastatin (LIPITOR) 40 MG tablet Take 1 tablet by mouth nightly  Qty: 30 tablet, Refills: 1      pantoprazole (PROTONIX) 20 MG tablet Take 1 tablet by mouth every morning (before breakfast)  Qty: 30 tablet, Refills: 1           STOP taking these medications       dicyclomine (BENTYL) 10 MG capsule Comments:   Reason for Stopping:         ondansetron (ZOFRAN ODT) 4 MG disintegrating tablet Comments:   Reason for Stopping:               Activity: as tolerated  Diet: low fat, low cholesterol diet  Wound Care: none needed    Follow-up:    Follow up with [address] for Adult Services   [facility], [address]   Phone [phone]   Fax [phone]   CRISIS LINE: [phone]          Follow up with [address] for Adult Services   [address] [place] [address], [address]   Phone [phone]   Fax [phone]   CRISIS LINE: [phone]       Time worked: 35 min    Participation: good    Electronically signed by Croy Rose MD on 12/5/2022 at 10:12 AM

## 2022-12-05 NOTE — PROGRESS NOTES
[facility] Adult Unit Daily Assessment  Nursing Progress Note    Room: [facility] [place]/606-01   Name: Stuart Essex   Age: 45 y.o. 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, with behavioral disturbance  Precautions: close watch and suicide risk  Inpatient Status: involuntary       SLEEP:  Sleep Quality Good  Sleep Medications: Yes   PRN Sleep Meds: Yes       MEDICAL:  Other PRN Meds: Yes   Med Compliant: Yes  Accu-Chek: No  Oxygen/CPAP/BiPAP: No  CIWA/CINA: No   PAIN Assessment: none  Side Effects from medication: No      Metabolic Screening:  No results found for: LABA1C  Lab Results   Component Value Date    CHOL 323 (H) 06/21/2021     Lab Results   Component Value Date    TRIG 324 (H) 06/21/2021     Lab Results   Component Value Date    HDL 46 (L) 06/21/2021     No components found for: LDLCAL  No results found for: LABVLDL  Body mass index is 29.05 kg/m². BP Readings from Last 2 Encounters:   12/04/22 (!) 148/65   06/22/21 125/83         Medical Bed:   Is patient in a medical bed? no   If medical bed is in use, has nursing secured room while patient is awake and out of the room? NA  Has safety checks by nursing been completed on the bed/room this shift? yes    Protective Factors:  Patient identifies protective factors with nursing staff as follows: Identifies reasons for living: Yes   Supportive Social Network or family: Yes    Belief that suicide is immoral/high spirituality: Yes   Responsibility to family or others/living with family: Yes   Fear of death or dying due to pain and suffering: No   Engaged in work or school: No     If Patient is unable to identify, reason why? No      PSYCH:  Depression: 0   Anxiety: 0   SI denies suicidal ideation   Risk of Suicide: No Risk  HI Negative for homicidal ideation      AVH:NA  If  Hallucinations are present, describe?  NA        GENERAL:  Appetite: no change from normal   Percent Meals: 75%   Social: Yes   Speech: normal   Appearance: appropriately dressed, appropriately groomed, and healthy looking    GROUP:  Group Participation: Yes  Participation Quality: Interactive    Notes: Patient is alert, cooperative, and realizes that she should stay off of social media for a while. She is Quaker but now understands that it was not God speaking to her through [employer]. Patient is excited about chance of going home soon. Patient was medication compliant and went to bed after administration. She slept throughout the shift with no complaints noted.           Electronically signed by Pancho Cleveland RN on 12/5/22 at 5:26 AM CST

## 2022-12-05 NOTE — PROGRESS NOTES
Collateral obtained from: patients son Bruno Bennett [phone]    Immediate Stressors & Time Episode Began: patient was taking medication as she was supposed to and she was fine. Patient doesn't have a therapist outside of the hospital.      Diagnosis/Hx of compliance with meds: taking medication as directed    Tx Hx/Past hospitalizations:  caller reports no megha treatment history    Family hx of psychiatric issues: caller reports family history of psychiatric issues -- history includes no issues    Substance Abuse: caller reports no substance abuse history    Pending Legal: caller reports no pending legal issues    Safety Issues (Weapons? Hx of attempts): The importance of locking weapons in a secured location was explained and recommended to collateral caller. Support system/Medication Managed by: The importance of medication management and locking extra medication in a secured location was explained and reccommended to collateral caller.  No issues    Discharge Disposition: home -lives with family    Additional Info:

## 2022-12-05 NOTE — DISCHARGE INSTR - DIET

## 2022-12-05 NOTE — PROGRESS NOTES
[facility] Adult Unit Daily Assessment  Nursing Progress Note    Room: [facility]/606-01   Name: Stuart Essex   Age: 45 y.o. Gender: female   Dx: Psychosis, unspecified psychosis type ([address]  Precautions: suicide risk  Inpatient Status: involuntary       SLEEP:  Sleep Quality Fair  Sleep Medications:    PRN Sleep Meds:        MEDICAL:  Other PRN Meds:    Med Compliant:   Accu-Chek:   Oxygen/CPAP/BiPAP: no  CIWA/CINA:  no  PAIN Assessment: none  Side Effects from medication: No      Metabolic Screening:  No results found for: LABA1C  Lab Results   Component Value Date    CHOL 323 (H) 06/21/2021     Lab Results   Component Value Date    TRIG 324 (H) 06/21/2021     Lab Results   Component Value Date    HDL 46 (L) 06/21/2021     No components found for: LDLCAL  No results found for: LABVLDL  Body mass index is 29.05 kg/m². BP Readings from Last 2 Encounters:   12/05/22 (!) 150/90   06/22/21 125/83         Medical Bed:   Is patient in a medical bed? no   If medical bed is in use, has nursing secured room while patient is awake and out of the room? NA  Has safety checks by nursing been completed on the bed/room this shift? NA    Protective Factors:  Patient identifies protective factors with nursing staff as follows: Identifies reasons for living: Yes   Supportive Social Network or family: Yes    Belief that suicide is immoral/high spirituality: Yes   Responsibility to family or others/living with family: Yes   Fear of death or dying due to pain and suffering: Yes   Engaged in work or school: Yes     If Patient is unable to identify, reason why? PSYCH:  Depression: better   Anxiety: better   SI denies suicidal ideation   Risk of Suicide: No Risk  HI Negative for homicidal ideation      AVH:no If Hallucinations are present, describe?          GENERAL:  Appetite: good   Percent Meals: 75%   Social: Yes   Speech: normal   Appearance: appropriately dressed and healthy looking    GROUP:  Group Participation: Yes  Participation Quality: Active Listener    Notes: Patient is up and in the day area for breakfast and medication. She is in the TV area with other patients. Valuables returned. There are no home medications. Patient understands all discharge instructions.          Electronically signed by Nelly Leslie RN on 12/5/22 at 5:04 PM CST

## 2022-12-05 NOTE — PROGRESS NOTES
Behavioral Health   Discharge Note  Bridge Appointment completed: Reviewed Discharge Instructions with patient. Patient verbalizes understanding and agreement with the discharge plan using the teachback method. Referral for Outpatient Tobacco Cessation Counseling, upon discharge (mayank X if applicable and completed):    ( )  Hospital staff assisted patient to call Quit Line or faxed referral                                   during hospitalization                  ( )  Recognizing danger situations (included triggers and roadblocks), if not completed on admission                    ( )  Coping skills (new ways to manage stress, exercise, relaxation techniques, changing routine, distraction), if not completed on admission                                                           ( )  Basic information about quitting (benefits of quitting, techniques in how to quit, available resources, if not completed on admission  ( ) Referral for counseling faxed to [facility]   ( ) Patient refused referral  ( ) Patient refused counseling  ( x) Patient refused smoking cessation medication upon discharge    Vaccinations (mayank X if applicable and completed):  ( ) Patient states already received influenza vaccine elsewhere  ( ) Patient received influenza vaccine during this hospitalization  ( x) Patient refused influenza vaccine at this time      Pt discharged with followings belongings:       Valuables sent home with yes. Valuables retrieved from [facility] and returned to patient. Yes Patient left department with staff via ambulatory discharged to self care Patient education on aftercare instructions: yes  Patient verbalize understanding of AVS:  yes. Suicidal Ideations? No Risk of Suicide: No Risk AVH? no HI?  Negative for homicidal ideation       Status EXAM upon discharge:  Mental Status and Behavioral Exam  Normal: Yes  Level of Assistance: Independent/Self  Facial Expression: Brightened  Affect: Appropriate  Level of Consciousness: Alert  Frequency of Checks: 4 times per hour, close  Mood:Normal: Yes  Mood: Anxious, Depressed  Motor Activity:Normal: Yes  Motor Activity: Increased  Eye Contact: Good  Observed Behavior: Cooperative, Friendly  Sexual Misconduct History: Past - no  Preception: [place] to person, [place] to time, [place] to place, [place] to situation  Attention:Normal: Yes  Attention:  (adequate)  Thought Processes:  (logical and goal oriented)  Thought Content:Normal: Yes  Thought Content:  (denies suicidal ideation)  Depression Symptoms: No problems reported or observed. Anxiety Symptoms: Generalized  [place] Symptoms: No problems reported or observed. Hallucinations: None  Delusions: No  Delusions:  (denies)  Memory:Normal: Yes  Memory:  (intact for recent and remote)  Insight and Judgment: Yes  Insight and Judgment:  (improved insight and judgment)    AVS/Transition Record has been discussed with patient and a copy was given to the patient. The AVS/Transition Record was faxed to the next level of care today. All valuables returned in full. Excited to go home. She is calm and cooperative. Denies suicidal, homicidal ideations.

## 2022-12-05 NOTE — PLAN OF CARE
Group Therapy Note     Date: 12/5/2022  Start Time: 1100  End Time:  5366  Number of Participants: 10     Type of Group: Psychotherapy     Wellness Binder Information  Module Name:  emotional wellness  Session Number:  1     Patient's Goal:  validation of feelings     Notes:  pt acknowledged to have feelings validated it may be necessary to share feelings with others.      Status After Intervention:  Improved     Participation Level: Interactive     Participation Quality: Appropriate, Attentive, and Sharing        Speech:  normal        Thought Process/Content: Logical        Affective Functioning: Congruent        Mood: congruent        Level of consciousness:  Alert, Oriented x4, and Attentive        Response to Learning: Able to verbalize current knowledge/experience        Endings: None Reported     Modes of Intervention: Education        Discipline Responsible: Psychoeducational Specialist        Signature:  Levi Wolf

## 2022-12-05 NOTE — DISCHARGE INSTR - ACTIVITY
Patient is here alone today.    If any information or results need to be relayed from today's visit, the best way to contact the patient is via [phone] (mobile) - Patient gives verbal permission to leave a detailed voicemail at the number provided.       Up ad leonora

## 2022-12-05 NOTE — PLAN OF CARE
Group Therapy Note    Date: 12/5/2022  Start Time: 1000  End Time:  1030  Number of Participants: 11    Type of Group: Psychoeducation    Wellness Binder Information  Module Name:  Relapse Prevention  Session Number:  5    Group Goal for Pt: To improve knowledge of relapse prevention strategies    Notes:  Pt demonstrated improved knowledge of relapse prevention strategies by actively participating in group discussion. Status After Intervention:  Unchanged    Participation Level:  Active Listener    Participation Quality: Appropriate and Attentive      Speech:  normal      Thought Process/Content: Logical      Affective Functioning: Congruent      Mood: anxious and depressed      Level of consciousness:  Alert and Oriented x4      Response to Learning: Able to verbalize current knowledge/experience, Able to verbalize/acknowledge new learning, and Progressing to goal      Endings: None Reported    Modes of Intervention: Education      Discipline Responsible: Psychoeducational Specialist      Signature:  Deyanira Jhaveri

## 2022-12-05 NOTE — PROGRESS NOTES
Treatment Team Note:    Target Symptoms/Reason for admission: Per nursing admission assessment - Reason for Admission: Jeffrey Butler is a 45 yr old female who came to the ED , \"My daughter put me out last month, I've been staying with my son. I've been getting all these messages on my phone and they are from God. Last night I got a message to go to [facility] and then I had to keep walking all around. \"     Patient seen in ED exam room 18 lying on bed. Patient is dressed in paper scrubs, has one-to-one sitter at bedside, patient appears anxious, is restless and fidgety, keeps getting up from the bed and walking around room attempting to show writer images and messages on her cell phone. She reports history of anxiety and depression, has not taken medications \"in awhile\". Reports history of polysubstance use including marijuana and cocaine, however patient reports has not used \"in awhile, I found God\". She reports she has been getting multiple messages on her phone all day, everyday and many of the messages are from God. Last night, she was found by police [place] [employer] [facility] [employer] staring up at that eagle and when police questioned her she stated \"you don't see him? It's God and it's the end of the world\". Patient denies suicidal ideations and homicidal ideations. Reports \"seeing God a few times holding his stick\". Denies auditory hallucinations. Patient is paranoid that someone has been recording her and taking pictures of her on her own phone. Denies alcohol use currently, reports alcohol abuse as recent as a couple months ago. Denies substance abuse currently. Denies history of suicide attempts and denies history of psychiatric hospitalizations. 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     Patient's aftercare plan is: SW will meet with patient to gather information    Aftercare appointments made: No - SW will make discharge appointments    Pt lives with:   Son    Collateral obtained from: son  Collateral obtained on:12/5/22    Attending groups: Yes    Behavior: cooperative    Has patient been completing ADL's:  Yes    SI:  patient denies SI  Plan: no   If yes describe: N/A - patient denies plan  HI:  patient denies HI  If present describe: N/A  Delusions: patient denies delusions  If present describe: N/A  Hallucinations: patient denies hallucinations  If present describe: N/A    Patient rates their -- Depression: 1-10:  0  Anxiety:1-10:  0    Sleeping:Yes    Taking medication: Yes    Misc:

## 2022-12-05 NOTE — PLAN OF CARE
Problem: Anxiety  Goal: Will report anxiety at manageable levels  Description: INTERVENTIONS:  1. Administer medication as ordered  2. Teach and rehearse alternative coping skills  3. Provide emotional support with 1:1 interaction with staff  Outcome: Completed     Problem: Confusion  Goal: Confusion, delirium, dementia, or psychosis is improved or at baseline  Description: INTERVENTIONS:  1. Assess for possible contributors to thought disturbance, including medications, impaired vision or hearing, underlying metabolic abnormalities, dehydration, psychiatric diagnoses, and notify attending LIP  2. [place] high risk fall precautions, as indicated  3. Provide frequent short contacts to provide reality reorientation, refocusing and direction  4. Decrease environmental stimuli, including noise as appropriate  5. Monitor and intervene to maintain adequate nutrition, hydration, elimination, sleep and activity  6. If unable to ensure safety without constant attention obtain sitter and review sitter guidelines with assigned personnel  7. Initiate Psychosocial CNS and Spiritual Care consult, as indicated  Outcome: Completed     Problem: Behavior  Goal: Pt/Family maintain appropriate behavior and adhere to behavioral management agreement, if implemented  Description: INTERVENTIONS:  1. Assess patient/family's coping skills and  non-compliant behavior (including use of illegal substances)  2. Notify security of behavior or suspected illegal substances which indicate the need for search of the family and/or belongings  3. Encourage verbalization of thoughts and concerns in a socially appropriate manner  4. Utilize positive, consistent limit setting strategies supporting safety of patient, staff and others  5. Encourage participation in the decision making process about the behavioral management agreement  6. If a visitor's behavior poses a threat to safety call refer to organization policy.   7. Initiate consult with Social Worker, Psychosocial CNS, Spiritual Care as appropriate  Outcome: Completed     Problem: Involuntary Admit  Goal: Will cooperate with staff recommendations and doctor's orders and will demonstrate appropriate behavior  Description: INTERVENTIONS:  1. Treat underlying conditions and offer medication as ordered  2. Educate regarding involuntary admission procedures and rules  3.  Contain excessive/inappropriate behavior per unit and hospital policies  Outcome: Completed

## 2022-12-06 NOTE — PROGRESS NOTES
Follow up call completed. Writer was not able to speak with the patient.  Patients number was out of services

## 2022-12-06 NOTE — PROGRESS NOTES
Progress Note  Sidney Brown  12/5/2022 10:51 PM  Subjective:   Admit Date:   12/2/2022      CC/ADMIT DX:       Interval History:   Reviewed overnight events and nursing notes. She has no new medical issues. I have reviewed all labs/diagnostics from the last 24hrs. ROS:   I have done a 10 point ROS and all are negative, except what is mentioned in the HPI. No diet orders on file    Medications:             Objective:   Vitals: BP (!) 150/90   Pulse (!) 108   Temp 97.7 °F (36.5 °C) (Temporal)   Resp 16   Ht 5' 6\" (1.676 m)   Wt 180 lb (81.6 kg)   LMP 01/30/2019   SpO2 97%   BMI 29.05 kg/m²  No intake or output data in the 24 hours ending 12/05/22 3981  General appearance: alert and cooperative with exam  Extremities: extremities normal, atraumatic, no cyanosis or edema  Neurologic:  No obvious focal neurologic deficits. 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              Electronically signed by Delfino Jacob MD on 12/5/2022 at 10:51 PM

## 2023-01-19 RX ORDER — ATORVASTATIN CALCIUM 40 MG/1
TABLET, FILM COATED ORAL
Qty: 30 TABLET | Refills: 1 | OUTPATIENT
Start: 2023-01-19

## 2023-01-29 ENCOUNTER — APPOINTMENT (OUTPATIENT)
Dept: CT IMAGING | Facility: HOSPITAL | Age: 53
End: 2023-01-29
Payer: MEDICARE

## 2023-01-29 ENCOUNTER — HOSPITAL ENCOUNTER (EMERGENCY)
Facility: HOSPITAL | Age: 53
Discharge: HOME | End: 2023-01-29
Admitting: EMERGENCY MEDICINE
Payer: MEDICARE

## 2023-01-29 VITALS
TEMPERATURE: 98.6 F | OXYGEN SATURATION: 98 % | HEART RATE: 78 BPM | HEIGHT: 66 IN | DIASTOLIC BLOOD PRESSURE: 105 MMHG | SYSTOLIC BLOOD PRESSURE: 140 MMHG | BODY MASS INDEX: 31.02 KG/M2 | WEIGHT: 193 LBS | RESPIRATION RATE: 16 BRPM

## 2023-01-29 DIAGNOSIS — R42 DIZZINESS: Primary | ICD-10-CM

## 2023-01-29 LAB
ALBUMIN SERPL-MCNC: 4 G/DL (ref 3.5–5.2)
ALBUMIN/GLOB SERPL: 1.2 G/DL
ALP SERPL-CCNC: 87 U/L (ref 39–117)
ALT SERPL W P-5'-P-CCNC: 24 U/L (ref 1–33)
ANION GAP SERPL CALCULATED.3IONS-SCNC: 11 MMOL/L (ref 5–15)
APTT PPP: 27.4 SECONDS (ref 24.1–35)
AST SERPL-CCNC: 17 U/L (ref 1–32)
BASOPHILS # BLD AUTO: 0.03 10*3/MM3 (ref 0–0.2)
BASOPHILS NFR BLD AUTO: 0.4 % (ref 0–1.5)
BILIRUB SERPL-MCNC: 0.4 MG/DL (ref 0–1.2)
BUN SERPL-MCNC: 23 MG/DL (ref 6–20)
BUN/CREAT SERPL: 23.2 (ref 7–25)
CALCIUM SPEC-SCNC: 9.5 MG/DL (ref 8.6–10.5)
CHLORIDE SERPL-SCNC: 105 MMOL/L (ref 98–107)
CO2 SERPL-SCNC: 25 MMOL/L (ref 22–29)
CREAT SERPL-MCNC: 0.99 MG/DL (ref 0.57–1)
DEPRECATED RDW RBC AUTO: 48.5 FL (ref 37–54)
EGFRCR SERPLBLD CKD-EPI 2021: 67.5 ML/MIN/1.73
EOSINOPHIL # BLD AUTO: 0.21 10*3/MM3 (ref 0–0.4)
EOSINOPHIL NFR BLD AUTO: 2.7 % (ref 0.3–6.2)
ERYTHROCYTE [DISTWIDTH] IN BLOOD BY AUTOMATED COUNT: 15.8 % (ref 12.3–15.4)
GLOBULIN UR ELPH-MCNC: 3.3 GM/DL
GLUCOSE SERPL-MCNC: 139 MG/DL (ref 65–99)
HCT VFR BLD AUTO: 38.6 % (ref 34–46.6)
HGB BLD-MCNC: 12.2 G/DL (ref 12–15.9)
HOLD SPECIMEN: NORMAL
IMM GRANULOCYTES # BLD AUTO: 0.01 10*3/MM3 (ref 0–0.05)
IMM GRANULOCYTES NFR BLD AUTO: 0.1 % (ref 0–0.5)
INR PPP: 0.91 (ref 0.91–1.09)
LYMPHOCYTES # BLD AUTO: 2.37 10*3/MM3 (ref 0.7–3.1)
LYMPHOCYTES NFR BLD AUTO: 30.9 % (ref 19.6–45.3)
MCH RBC QN AUTO: 27.1 PG (ref 26.6–33)
MCHC RBC AUTO-ENTMCNC: 31.6 G/DL (ref 31.5–35.7)
MCV RBC AUTO: 85.6 FL (ref 79–97)
MONOCYTES # BLD AUTO: 0.83 10*3/MM3 (ref 0.1–0.9)
MONOCYTES NFR BLD AUTO: 10.8 % (ref 5–12)
NEUTROPHILS NFR BLD AUTO: 4.21 10*3/MM3 (ref 1.7–7)
NEUTROPHILS NFR BLD AUTO: 55.1 % (ref 42.7–76)
NRBC BLD AUTO-RTO: 0 /100 WBC (ref 0–0.2)
PLATELET # BLD AUTO: 240 10*3/MM3 (ref 140–450)
PMV BLD AUTO: 9.9 FL (ref 6–12)
POTASSIUM SERPL-SCNC: 3.9 MMOL/L (ref 3.5–5.2)
PROT SERPL-MCNC: 7.3 G/DL (ref 6–8.5)
PROTHROMBIN TIME: 12.4 SECONDS (ref 11.8–14.8)
RBC # BLD AUTO: 4.51 10*6/MM3 (ref 3.77–5.28)
SODIUM SERPL-SCNC: 141 MMOL/L (ref 136–145)
TROPONIN T SERPL-MCNC: <0.01 NG/ML (ref 0–0.03)
TROPONIN T SERPL-MCNC: <0.01 NG/ML (ref 0–0.03)
WBC NRBC COR # BLD: 7.66 10*3/MM3 (ref 3.4–10.8)
WHOLE BLOOD HOLD COAG: NORMAL
WHOLE BLOOD HOLD SPECIMEN: NORMAL

## 2023-01-29 PROCEDURE — 99284 EMERGENCY DEPT VISIT MOD MDM: CPT

## 2023-01-29 PROCEDURE — 70450 CT HEAD/BRAIN W/O DYE: CPT

## 2023-01-29 PROCEDURE — 85025 COMPLETE CBC W/AUTO DIFF WBC: CPT | Performed by: NURSE PRACTITIONER

## 2023-01-29 PROCEDURE — 85730 THROMBOPLASTIN TIME PARTIAL: CPT | Performed by: NURSE PRACTITIONER

## 2023-01-29 PROCEDURE — 80053 COMPREHEN METABOLIC PANEL: CPT | Performed by: NURSE PRACTITIONER

## 2023-01-29 PROCEDURE — 84484 ASSAY OF TROPONIN QUANT: CPT | Performed by: NURSE PRACTITIONER

## 2023-01-29 PROCEDURE — 36415 COLL VENOUS BLD VENIPUNCTURE: CPT

## 2023-01-29 PROCEDURE — 85610 PROTHROMBIN TIME: CPT | Performed by: NURSE PRACTITIONER

## 2023-01-29 PROCEDURE — 93005 ELECTROCARDIOGRAM TRACING: CPT

## 2023-01-29 PROCEDURE — 93010 ELECTROCARDIOGRAM REPORT: CPT | Performed by: INTERNAL MEDICINE

## 2023-01-29 RX ORDER — SODIUM CHLORIDE 0.9 % (FLUSH) 0.9 %
10 SYRINGE (ML) INJECTION AS NEEDED
Status: DISCONTINUED | OUTPATIENT
Start: 2023-01-29 | End: 2023-01-29 | Stop reason: HOSPADM

## 2023-02-01 LAB
QT INTERVAL: 394 MS
QTC INTERVAL: 451 MS

## 2023-02-01 NOTE — ED PROVIDER NOTES
Subjective   History of Present Illness  Patient is a 53-year-old female who presents to the ER with chief complaints of hypertension and tingling to her hands bilaterally.  She reports mild dizziness at times however denies any vertiginous symptoms.  Patient states she was recently prescribed medication for hypertension.  She was uncertain if this was a medication reaction.  She denies any chest pain currently.  She has no shortness of breath.  She has had no cough however has had mild congestion.  She is currently using nasal saline for this issue.  Patient has no shortness of breath.  She has had no syncopal episodes.  She reports tingling to her hands bilaterally without any unilateral weakness or numbness.  Past medical history significant for hyperlipidemia and hypertension.        Review of Systems   Constitutional: Negative.  Negative for fever.   HENT: Negative.  Negative for congestion.    Eyes: Negative.    Respiratory: Negative.  Negative for cough and shortness of breath.    Cardiovascular: Negative.  Negative for chest pain.   Gastrointestinal: Negative.  Negative for abdominal pain, diarrhea, nausea and vomiting.   Genitourinary: Negative.    Musculoskeletal: Negative.  Negative for back pain.   Skin: Negative.  Negative for rash and wound.   Neurological: Positive for dizziness.        Positive for tingling to hands bilaterally       Past Medical History:   Diagnosis Date   • Elevated cholesterol    • Hypertension        Allergies   Allergen Reactions   • Penicillins Rash       Past Surgical History:   Procedure Laterality Date   •  SECTION         Family History   Problem Relation Age of Onset   • Diabetes Other        Social History     Socioeconomic History   • Marital status: Single   Tobacco Use   • Smoking status: Some Days     Packs/day: 1.00     Types: Cigarettes   • Smokeless tobacco: Never   Substance and Sexual Activity   • Alcohol use: Not Currently     Alcohol/week: 13.0 standard  "drinks     Types: 2 Glasses of wine, 7 Cans of beer, 4 Shots of liquor per week     Comment: once starts cant stop   • Drug use: Not Currently     Types: Marijuana, \"Crack\" cocaine   • Sexual activity: Defer           Objective   Physical Exam  Vitals and nursing note reviewed.   Constitutional:       Appearance: Normal appearance. She is well-developed.   HENT:      Head: Normocephalic and atraumatic.      Right Ear: External ear normal.      Left Ear: External ear normal.      Nose: Nose normal.      Mouth/Throat:      Mouth: Mucous membranes are moist.      Pharynx: Oropharynx is clear.   Eyes:      Extraocular Movements: Extraocular movements intact.      Conjunctiva/sclera: Conjunctivae normal.      Pupils: Pupils are equal, round, and reactive to light.   Cardiovascular:      Rate and Rhythm: Normal rate and regular rhythm.      Heart sounds: Normal heart sounds.   Pulmonary:      Effort: Pulmonary effort is normal.      Breath sounds: Normal breath sounds.   Abdominal:      General: Bowel sounds are normal.      Palpations: Abdomen is soft.   Musculoskeletal:         General: No swelling or tenderness. Normal range of motion.      Cervical back: Normal range of motion and neck supple.      Comments: Patient has good  strength, pulses are palpable bilaterally, extremities are warm and dry, she has sensation to her arms and hands bilaterally, no swelling or erythema noted   Skin:     General: Skin is warm and dry.      Capillary Refill: Capillary refill takes less than 2 seconds.   Neurological:      General: No focal deficit present.      Mental Status: She is alert and oriented to person, place, and time.   Psychiatric:         Mood and Affect: Mood normal.         Behavior: Behavior normal.         Thought Content: Thought content normal.         Judgment: Judgment normal.         Procedures           ED Course                                           Medical Decision Making  Patient is a 53-year-old " female who presents to the ER with chief complaints of hypertension and tingling to her hands bilaterally.  Patient states she was recently prescribed medication for hypertension.  She was uncertain if this was a medication reaction.  She denies any chest pain currently.  She has no shortness of breath.  She has had no cough however has had mild congestion.  She is currently using nasal saline for this issue.  Patient has no shortness of breath.  She has had no syncopal episodes.  She reports tingling to her hands bilaterally without any unilateral weakness or numbness.  Past medical history significant for hyperlipidemia and hypertension.  Differential diagnosis: Neuropathy, anxiety, medication reaction, ACS, and other    Patient's labs are unremarkable including 2 negative cardiac markers, EKG within normal limits, CT scan of the head is negative.  Patient will need to follow-up with her PCP with continued symptoms.  She is stable for discharge.    Dizziness: complicated acute illness or injury  Amount and/or Complexity of Data Reviewed  Labs: ordered. Decision-making details documented in ED Course.  Radiology: ordered. Decision-making details documented in ED Course.  ECG/medicine tests:  Decision-making details documented in ED Course.          Final diagnoses:   Dizziness       ED Disposition  ED Disposition     ED Disposition   Discharge    Condition   Good    Comment   --             No follow-up provider specified.       Medication List      No changes were made to your prescriptions during this visit.          Leia Nicole, APRN  01/31/23 4022

## 2023-02-09 ENCOUNTER — TELEPHONE (OUTPATIENT)
Dept: NEUROLOGY | Age: 53
End: 2023-02-09

## 2023-02-09 NOTE — TELEPHONE ENCOUNTER
Received a referral from Dr. Sheree Sneed office for this patient.  Called and left patient a VM to call our office back to where we can get her scheduled an appointment with MELLY Barriga.

## 2023-03-14 ENCOUNTER — HOSPITAL ENCOUNTER (OUTPATIENT)
Dept: WOMENS IMAGING | Age: 53
Discharge: HOME | End: 2023-03-14
Payer: MEDICARE

## 2023-03-14 DIAGNOSIS — Z12.31 ENCOUNTER FOR SCREENING MAMMOGRAM FOR MALIGNANT NEOPLASM OF BREAST: ICD-10-CM

## 2023-03-14 PROCEDURE — 77063 BREAST TOMOSYNTHESIS BI: CPT

## 2023-04-07 ENCOUNTER — OFFICE VISIT (OUTPATIENT)
Dept: NEUROLOGY | Age: 53
End: 2023-04-07
Payer: MEDICARE

## 2023-04-07 VITALS
SYSTOLIC BLOOD PRESSURE: 103 MMHG | OXYGEN SATURATION: 97 % | WEIGHT: 180 LBS | BODY MASS INDEX: 28.93 KG/M2 | DIASTOLIC BLOOD PRESSURE: 70 MMHG | HEIGHT: 66 IN | HEART RATE: 97 BPM

## 2023-04-07 DIAGNOSIS — R06.81 WITNESSED APNEIC SPELLS: ICD-10-CM

## 2023-04-07 DIAGNOSIS — R40.0 SOMNOLENCE, DAYTIME: ICD-10-CM

## 2023-04-07 DIAGNOSIS — G47.00 INSOMNIA, UNSPECIFIED TYPE: ICD-10-CM

## 2023-04-07 DIAGNOSIS — R06.83 SNORING: ICD-10-CM

## 2023-04-07 DIAGNOSIS — G47.33 OBSTRUCTIVE SLEEP APNEA: Primary | ICD-10-CM

## 2023-04-07 DIAGNOSIS — R06.89 GASPING FOR BREATH: ICD-10-CM

## 2023-04-07 PROCEDURE — 99203 OFFICE O/P NEW LOW 30 MIN: CPT | Performed by: PHYSICIAN ASSISTANT

## 2023-04-07 PROCEDURE — 3078F DIAST BP <80 MM HG: CPT | Performed by: PHYSICIAN ASSISTANT

## 2023-04-07 PROCEDURE — 3074F SYST BP LT 130 MM HG: CPT | Performed by: PHYSICIAN ASSISTANT

## 2023-04-07 RX ORDER — METOPROLOL SUCCINATE 25 MG/1
25 TABLET, EXTENDED RELEASE ORAL NIGHTLY
COMMUNITY
Start: 2023-02-13

## 2023-04-07 RX ORDER — EMPAGLIFLOZIN 10 MG/1
TABLET, FILM COATED ORAL
COMMUNITY
Start: 2023-01-31

## 2023-04-07 RX ORDER — IRBESARTAN 75 MG/1
TABLET ORAL
COMMUNITY
Start: 2023-01-31

## 2023-04-07 RX ORDER — FLUTICASONE PROPIONATE 50 MCG
SPRAY, SUSPENSION (ML) NASAL
COMMUNITY
Start: 2023-01-19

## 2023-04-07 NOTE — PROGRESS NOTES
REVIEW OF SYSTEMS    Constitutional: []Fever []Sweats []Chills [] Recent Injury [x] Denies all unless marked  HEENT:[]Headache  [] Head Injury [] Hearing Loss  [] Sore Throat  [] Ear Ache [x] Denies all unless marked  Spine:  [] Neck pain  [] Back pain  [] Sciaticia  [x] Denies all unless marked  Cardiovascular:[]Heart Disease []Palpitations [] Chest Pain   [x] Denies all unless marked  Pulmonary: []Shortness of Breath []Cough   [x] Denies all unless marked  Psychiatric/Behavioral:[] Depression [] Anxiety [x] Denies all unless marked  Gastrointestinal: []Nausea  []Vomiting  []Abdominal Pain  []Constipation  []Diarrhea  [x] Denies all unless marked  Genitourinary:   [] Frequency  [] Urgency  [] Dysuria [] Incontinence  [x] Denies all unless marked  Extremities: []Pain  []Swelling  [x] Denies all unless marked  Musculoskeletal: [] Myalgias  [] Joint Pain  [] Arthritis [] Muscle Cramps [] Muscle Twitches  [x] Denies all unless marked  Sleep: []Insomnia[]Snoring []Restless Legs  []Sleep Apnea  []Daytime Sleepiness  [x] Denies all unless marked  Skin:[] Rash [] Color Change [x] Denies all unless marked   Neurological:[]Visual Disturbance [] Memory Loss []Loss of Balance []Slurred Speech []Weakness []Seizures  [] Dizziness [x] Denies all unless marked
[x] Denies all unless marked  Genitourinary:   [] Frequency  [] Urgency  [] Dysuria [] Incontinence  [x] Denies all unless marked  Extremities: []Pain  []Swelling  [x] Denies all unless marked  Musculoskeletal: [] Myalgias  [] Joint Pain  [] Arthritis [] Muscle Cramps [] Muscle Twitches  [x] Denies all unless marked  Sleep: [x]Insomnia[x]Snoring []Restless Legs  []Sleep Apnea  [x]Daytime Sleepiness  [x] Denies all unless marked  Skin:[] Rash [] Color Change [x] Denies all unless marked   Neurological:[]Visual Disturbance [] Memory Loss []Loss of Balance []Slurred Speech []Weakness []Seizures  [] Dizziness [x] Denies all unless marked     The MA has completed the ROS with the patient. I have reviewed it in its' entirety with the patient and agree with the documentation. PHYSICAL EXAM  /70   Pulse 97   Ht 5' 6\" (1.676 m)   Wt 180 lb (81.6 kg)   LMP 01/30/2019   SpO2 97%   BMI 29.05 kg/m²    Neck circumference:  15 inches  Mallampati: Type 3  Constitutional -  Alert in NAD, well developed, pleasant and cooperative with exam  HEENT- Conjunctiva normal.  No scars, masses, or lesions over external nose or ears, hearing intact, no neck masses noted, no jugular vein distension, no bruit  Cardiac- Regular rate and rhythm  Pulmonary- Clear to auscultation, good expansion, normal effort without use of accessory muscles  Musculoskeletal - No significant wasting of muscles noted, no bony deformities  Extremities - No clubbing, cyanosis or edema  Skin - Warm, dry, and intact.   No rash, erythema, or pallor  Psychiatric - Mood, affect, and behavior appear normal      Neurological exam  Awake, alert, fluent oriented x 3 appropriate affect  Attention and concentration appear appropriate  Recent and remote memory appears unremarkable  Speech normal without dysarthria  No clear issues with language of fund of knowledge    Cranial Nerve Exam   CN II- Visual fields grossly unremarkable  CN III, IV,VI-EOMI, No nystagmus,

## 2023-04-07 NOTE — PATIENT INSTRUCTIONS
apnea. Testing is usually performed in a sleep laboratory. A full sleep study is called a polysomnogram. The polysomnogram measures the breathing effort and airflow, blood oxygen level, heart rate and rhythm, duration of the various stages of sleep, body position, and movement of the arms/legs. Home monitoring devices are available that can perform a sleep study. This is a reasonable alternative to conventional testing in a sleep laboratory if the clinician strongly suspects moderate or severe sleep apnea and the patient does not have other illnesses or sleep disorders that may interfere with the results. SLEEP APNEA TREATMENT -- Sleep apnea is best treated by a knowledgeable sleep medicine specialist. The goal of treatment is to maintain an open airway during sleep. Effective treatment will eliminate the symptoms of sleep disturbance; long-term health consequences are also reduced. Most treatments require nightly use. The challenge for the clinician and the patient is to select an effective therapy that is appropriate for the patient's problem and that is acceptable for long term use. Auto-titrating CPAP delivers an amount of PAP that varies during the night. The variation is dependent on event detection software algorithms, which will increase the pressure gradually in response to flow changes until adequate patency is detected. After a period of sustained upper airway patency, the delivered level of pressure gradually decreases until the algorithm identifies recurrent upper airway obstruction, at which point the delivered pressure again increases. The result is that the delivered pressure varies throughout the night, in an effort to provide the lowest pressure that is necessary to maintain upper airway patency. Continuous positive airway pressure (CPAP) -- The most effective treatment for sleep apnea uses air pressure from a mechanical device to keep the upper airway open during sleep.  A CPAP

## 2023-05-15 ENCOUNTER — HOSPITAL ENCOUNTER (EMERGENCY)
Age: 53
Discharge: HOME | End: 2023-05-15
Payer: MEDICARE

## 2023-05-15 ENCOUNTER — APPOINTMENT (OUTPATIENT)
Dept: CT IMAGING | Age: 53
End: 2023-05-15
Payer: MEDICARE

## 2023-05-15 VITALS
RESPIRATION RATE: 16 BRPM | WEIGHT: 180 LBS | TEMPERATURE: 97.7 F | OXYGEN SATURATION: 96 % | HEIGHT: 66 IN | BODY MASS INDEX: 28.93 KG/M2 | SYSTOLIC BLOOD PRESSURE: 105 MMHG | DIASTOLIC BLOOD PRESSURE: 66 MMHG | HEART RATE: 90 BPM

## 2023-05-15 DIAGNOSIS — R10.32 LEFT LOWER QUADRANT ABDOMINAL PAIN: Primary | ICD-10-CM

## 2023-05-15 LAB
ALBUMIN SERPL-MCNC: 4 G/DL (ref 3.5–5.2)
ALP SERPL-CCNC: 87 U/L (ref 35–104)
ALT SERPL-CCNC: 23 U/L (ref 5–33)
ANION GAP SERPL CALCULATED.3IONS-SCNC: 11 MMOL/L (ref 7–19)
AST SERPL-CCNC: 22 U/L (ref 5–32)
BACTERIA URNS QL MICRO: NEGATIVE /HPF
BASOPHILS # BLD: 0.1 K/UL (ref 0–0.2)
BASOPHILS NFR BLD: 0.7 % (ref 0–1)
BILIRUB SERPL-MCNC: 0.3 MG/DL (ref 0.2–1.2)
BILIRUB UR QL STRIP: NEGATIVE
BUN SERPL-MCNC: 18 MG/DL (ref 6–20)
CALCIUM SERPL-MCNC: 10.7 MG/DL (ref 8.6–10)
CHLORIDE SERPL-SCNC: 102 MMOL/L (ref 98–111)
CLARITY UR: CLEAR
CO2 SERPL-SCNC: 26 MMOL/L (ref 22–29)
COLOR UR: YELLOW
CREAT SERPL-MCNC: 1.2 MG/DL (ref 0.5–0.9)
CRYSTALS URNS MICRO: ABNORMAL /HPF
EOSINOPHIL # BLD: 0.2 K/UL (ref 0–0.6)
EOSINOPHIL NFR BLD: 2.4 % (ref 0–5)
EPI CELLS #/AREA URNS AUTO: 6 /HPF (ref 0–5)
ERYTHROCYTE [DISTWIDTH] IN BLOOD BY AUTOMATED COUNT: 15.2 % (ref 11.5–14.5)
GLUCOSE SERPL-MCNC: 102 MG/DL (ref 74–109)
GLUCOSE UR STRIP.AUTO-MCNC: =>1000 MG/DL
HCG UR QL: NEGATIVE
HCT VFR BLD AUTO: 40.9 % (ref 37–47)
HGB BLD-MCNC: 13.1 G/DL (ref 12–16)
HGB UR STRIP.AUTO-MCNC: NEGATIVE MG/L
HYALINE CASTS #/AREA URNS AUTO: 2 /HPF (ref 0–8)
IMM GRANULOCYTES # BLD: 0 K/UL
KETONES UR STRIP.AUTO-MCNC: NEGATIVE MG/DL
LEUKOCYTE ESTERASE UR QL STRIP.AUTO: ABNORMAL
LIPASE SERPL-CCNC: 36 U/L (ref 13–60)
LYMPHOCYTES # BLD: 2.9 K/UL (ref 1.1–4.5)
LYMPHOCYTES NFR BLD: 32.1 % (ref 20–40)
MCH RBC QN AUTO: 28.2 PG (ref 27–31)
MCHC RBC AUTO-ENTMCNC: 32 G/DL (ref 33–37)
MCV RBC AUTO: 88.1 FL (ref 81–99)
MONOCYTES # BLD: 0.9 K/UL (ref 0–0.9)
MONOCYTES NFR BLD: 10.5 % (ref 0–10)
NEUTROPHILS # BLD: 4.8 K/UL (ref 1.5–7.5)
NEUTS SEG NFR BLD: 54 % (ref 50–65)
NITRITE UR QL STRIP.AUTO: NEGATIVE
PH UR STRIP.AUTO: 6 [PH] (ref 5–8)
PLATELET # BLD AUTO: 244 K/UL (ref 130–400)
PMV BLD AUTO: 9.7 FL (ref 9.4–12.3)
POTASSIUM SERPL-SCNC: 3.8 MMOL/L (ref 3.5–5)
PROT SERPL-MCNC: 7.5 G/DL (ref 6.6–8.7)
PROT UR STRIP.AUTO-MCNC: 30 MG/DL
RBC # BLD AUTO: 4.64 M/UL (ref 4.2–5.4)
RBC #/AREA URNS AUTO: 2 /HPF (ref 0–4)
SODIUM SERPL-SCNC: 139 MMOL/L (ref 136–145)
SP GR UR STRIP.AUTO: 1.02 (ref 1–1.03)
UROBILINOGEN UR STRIP.AUTO-MCNC: 0.2 E.U./DL
WBC # BLD AUTO: 8.9 K/UL (ref 4.8–10.8)
WBC #/AREA URNS AUTO: 17 /HPF (ref 0–5)

## 2023-05-15 PROCEDURE — 74177 CT ABD & PELVIS W/CONTRAST: CPT

## 2023-05-15 PROCEDURE — 99285 EMERGENCY DEPT VISIT HI MDM: CPT

## 2023-05-15 PROCEDURE — 81001 URINALYSIS AUTO W/SCOPE: CPT

## 2023-05-15 PROCEDURE — 2580000003 HC RX 258: Performed by: NURSE PRACTITIONER

## 2023-05-15 PROCEDURE — 87086 URINE CULTURE/COLONY COUNT: CPT

## 2023-05-15 PROCEDURE — 36415 COLL VENOUS BLD VENIPUNCTURE: CPT

## 2023-05-15 PROCEDURE — 83690 ASSAY OF LIPASE: CPT

## 2023-05-15 PROCEDURE — 85025 COMPLETE CBC W/AUTO DIFF WBC: CPT

## 2023-05-15 PROCEDURE — 84703 CHORIONIC GONADOTROPIN ASSAY: CPT

## 2023-05-15 PROCEDURE — 80053 COMPREHEN METABOLIC PANEL: CPT

## 2023-05-15 PROCEDURE — 6360000004 HC RX CONTRAST MEDICATION: Performed by: NURSE PRACTITIONER

## 2023-05-15 RX ORDER — 0.9 % SODIUM CHLORIDE 0.9 %
1000 INTRAVENOUS SOLUTION INTRAVENOUS ONCE
Status: COMPLETED | OUTPATIENT
Start: 2023-05-15 | End: 2023-05-15

## 2023-05-15 RX ADMIN — IOPAMIDOL 70 ML: 755 INJECTION, SOLUTION INTRAVENOUS at 16:37

## 2023-05-15 RX ADMIN — SODIUM CHLORIDE 1000 ML: 9 INJECTION, SOLUTION INTRAVENOUS at 17:22

## 2023-05-15 ASSESSMENT — ENCOUNTER SYMPTOMS
ABDOMINAL PAIN: 1
VOMITING: 0
CONSTIPATION: 1

## 2023-05-15 NOTE — ED PROVIDER NOTES
[facility]  eMERGENCY dEPARTMENT eNCOUnter      Pt Name: Darren Pozo  MRN: [id number]  [place] 1970  Date of evaluation: 5/15/2023  Provider: Cheli Root, [address]       Chief Complaint   Patient presents with    Abdominal Pain     RUQ pain x1 week         HISTORY OF PRESENT ILLNESS   (Location/Symptom, Timing/Onset,Context/Setting, Quality, Duration, Modifying Factors, Severity)  Note limiting factors. Darren Pozo is a 52 y.o. female who presents to the emergency department with left-sided abdominal pain x1 week. Patient describes it as soreness. She denies any prior abdominal surgeries of the chart shows a cholecystectomy and a . She does states she is constipated. She denies any vomiting. She denies any urinary frequency or dysuria. She denies any back pain. She denies any fever. She denies alcohol and does not appear to be intoxicated. She is a poor historian    The history is provided by the patient. Abdominal Pain  Pain location:  LUQ and LLQ  Pain quality: aching    Pain quality comment:  Soreness  Pain severity:  Mild  Duration:  1 week  Timing:  Constant  Progression:  Unchanged  Chronicity:  New  Ineffective treatments:  None tried  Associated symptoms: constipation    Associated symptoms: no anorexia, no fever and no vomiting    Risk factors: obesity      NursingNotes were reviewed. REVIEW OF SYSTEMS    (2-9 systems for level 4, 10 or more for level 5)     Review of Systems   Constitutional:  Negative for fever. Gastrointestinal:  Positive for abdominal pain and constipation. Negative for anorexia and vomiting. Except as noted above the remainder of the review of systems was reviewed and negative.        PAST MEDICAL HISTORY     Past Medical History:   Diagnosis Date    Alcohol abuse, episodic drinking behavior 2021    Asthma     Atherosclerosis of native arteries of the extremities with intermittent claudication 2013    GERD

## 2023-05-17 LAB — BACTERIA UR CULT: NORMAL

## 2023-05-24 ENCOUNTER — HOSPITAL ENCOUNTER (OUTPATIENT)
Dept: SLEEP CENTER | Age: 53
Discharge: HOME | End: 2023-05-26
Payer: MEDICARE

## 2023-05-24 PROCEDURE — 95811 POLYSOM 6/>YRS CPAP 4/> PARM: CPT

## 2023-05-25 NOTE — PROGRESS NOTES
[place] [address]  [place], [address]  Phone [phone] Fax [phone]     Sleep Study Technician Review    Patient Name:  Josep Mendiola  :   1970  Referring Provider: MELLY Jenkins    Brief History:  Josep Mendiola is a 52 y.o. female with a history of Alcohol abuse, episodic drinking behavior, Asthma, Atherosclerosis of native arteries of the extremities with intermittent claudication, GERD (gastroesophageal reflux disease), HTN (hypertension), Hyperlipidemia, Hypertension, and Obesity (BMI 30-39.9). who was referred for a sleep study. She reports that her daughter reports that she snores, has witnessed apneas, gasping, and excessive daytime somnolence. She has difficulty initiating and maintaining sleep. She has frequent awakenings. She has nocturia. Her bedtime is 9:00 PM and her wake up time is 10:00-11:00 AM. Her sleep is non-restorative. She does not have night sweats. She denies RLS. Height:   5' 6\"  Weight:  180 lbs  BMI: 29.0  Neck Circ: 15\"  Mallampati 3  ESS: 7    Type of Study: Split Night PSG  Time Stage Position Snore Hypopnea Obs Apnea Pietro Apnea PAP O2   2200 Awake Right No No No No  RA   2300 Awake Supine No No No No  RA   2400 2 Supine Yes Yes No No  RA   0100 Awake Supine No No No No Cpap 6 RA   0200 2 Supine Yes No No No Cpap 11 RA   0300 2 Supine No No No No Cpap 13 RA   0400 2 Supine No No No No Bipap 17/13 RA   0430 2 Supine No No No No Bipap 17/13 RA              Summary:  Pt stated that her daughter says she snores and holds her breathe in her sleep. Pt stated she does feel tired during the day. Pt had several events with snoring. Tech in to begin titration. Pt was switched to bipap due to unable to tolerate cpap therapy. Pt voiced no complaints. DME: Donal Morton     Final PAP settings: Bipap 17/13  Mask Type: Full Face  Mask: Vitera   Mask Size: medium    The study was reviewed briefly with Josep Mendiola.   She will be

## 2023-06-05 ENCOUNTER — TELEPHONE (OUTPATIENT)
Dept: GASTROENTEROLOGY | Age: 53
End: 2023-06-05

## 2023-06-05 NOTE — TELEPHONE ENCOUNTER
Provider is calling  for status of referral  Please return call to update Patient on the referral status. Carolee Wisdom preferred call back time is Anytime. Thank you!

## 2023-06-05 NOTE — TELEPHONE ENCOUNTER
This must be a referral for a screening colon. I do not have those with me right now. I will check when I get back into the office tomorrow.

## 2023-06-06 ENCOUNTER — HOSPITAL ENCOUNTER (OUTPATIENT)
Dept: WOMENS IMAGING | Age: 53
Discharge: HOME | End: 2023-06-06
Payer: MEDICARE

## 2023-06-06 DIAGNOSIS — Z78.0 ASYMPTOMATIC MENOPAUSAL STATE: ICD-10-CM

## 2023-06-06 PROCEDURE — 77080 DXA BONE DENSITY AXIAL: CPT

## 2023-06-09 DIAGNOSIS — G47.33 OBSTRUCTIVE SLEEP APNEA: Primary | ICD-10-CM

## 2023-06-10 NOTE — PROGRESS NOTES
[place] [address]  [place], [address]  Phone [phone] Fax [phone]     Patient Name: Darren Pozo 2023  : 1970  Age: 52 y.o.   Patient Address: [address] [place] [address]  [address] [place] [id number]       Patient Phone: [phone] (home)     REFERRAL  Referred to: DME provider of patient's choice  Darren Pozo is referred for the following:    DME Equipment HPCPS Code Setting   Auto Adjusting BiPAP device with flex or comparable pressure relief per comfort  Min EPAP: 8cm to   Max IPAP: 20cm,   Pressure Support Range:  4cm to 5cm   Heated Humidifier  Patient Choice       Replinishible PAP Supplies, 1 year supply  Item HPCPS Code Frequency   Mask of choice  or  1 per 3 months   Nasal Mask cushion/pillows  or  2 per 30 days   Full Face Mask Interface  1 per 30 days   Headgear  1 per 6 months   Tubing, length of choice  or  1 per 3 months   Water Chamber  1 per 6 months   Chinstrap  1 per 6 months   Disposable Filters  2 per 30 days   Reusable Filters  1 per 6 months     Diagnoses:  Obstructive sleep apnea (G47.33)  Length of Need: Lifetime, 99    Ordering Provider: NIA Mosley Dys: [id number]         Signature:       Date: 2023   Electronically Signed by Salbador Cagle M.D.

## 2023-07-17 ENCOUNTER — ANESTHESIA EVENT (OUTPATIENT)
Dept: OPERATING ROOM | Age: 53
End: 2023-07-17

## 2023-07-18 ENCOUNTER — APPOINTMENT (OUTPATIENT)
Dept: OPERATING ROOM | Age: 53
End: 2023-07-18
Attending: INTERNAL MEDICINE

## 2023-07-18 ENCOUNTER — ANESTHESIA (OUTPATIENT)
Dept: OPERATING ROOM | Age: 53
End: 2023-07-18

## 2023-07-18 ENCOUNTER — HOSPITAL ENCOUNTER (OUTPATIENT)
Age: 53
Setting detail: SPECIMEN
Discharge: HOME | End: 2023-07-18
Payer: MEDICARE

## 2023-07-18 ENCOUNTER — HOSPITAL ENCOUNTER (OUTPATIENT)
Age: 53
Setting detail: OUTPATIENT SURGERY
Discharge: HOME | End: 2023-07-18
Attending: INTERNAL MEDICINE | Admitting: INTERNAL MEDICINE
Payer: MEDICARE

## 2023-07-18 VITALS
WEIGHT: 180 LBS | HEIGHT: 66 IN | DIASTOLIC BLOOD PRESSURE: 68 MMHG | BODY MASS INDEX: 28.93 KG/M2 | HEART RATE: 81 BPM | SYSTOLIC BLOOD PRESSURE: 96 MMHG | TEMPERATURE: 97.7 F | RESPIRATION RATE: 16 BRPM | OXYGEN SATURATION: 95 %

## 2023-07-18 PROCEDURE — 88305 TISSUE EXAM BY PATHOLOGIST: CPT

## 2023-07-18 PROCEDURE — 45385 COLONOSCOPY W/LESION REMOVAL: CPT

## 2023-07-18 PROCEDURE — 45385 COLONOSCOPY W/LESION REMOVAL: CPT | Performed by: INTERNAL MEDICINE

## 2023-07-18 RX ORDER — SODIUM CHLORIDE, SODIUM LACTATE, POTASSIUM CHLORIDE, CALCIUM CHLORIDE 600; 310; 30; 20 MG/100ML; MG/100ML; MG/100ML; MG/100ML
INJECTION, SOLUTION INTRAVENOUS CONTINUOUS
Status: DISCONTINUED | OUTPATIENT
Start: 2023-07-18 | End: 2023-07-18 | Stop reason: HOSPADM

## 2023-07-18 RX ORDER — LIDOCAINE HYDROCHLORIDE 10 MG/ML
INJECTION, SOLUTION EPIDURAL; INFILTRATION; INTRACAUDAL; PERINEURAL PRN
Status: DISCONTINUED | OUTPATIENT
Start: 2023-07-18 | End: 2023-07-18 | Stop reason: SDUPTHER

## 2023-07-18 RX ORDER — PROPOFOL 10 MG/ML
INJECTION, EMULSION INTRAVENOUS PRN
Status: DISCONTINUED | OUTPATIENT
Start: 2023-07-18 | End: 2023-07-18 | Stop reason: SDUPTHER

## 2023-07-18 RX ADMIN — PROPOFOL 220 MG: 10 INJECTION, EMULSION INTRAVENOUS at 10:56

## 2023-07-18 RX ADMIN — LIDOCAINE HYDROCHLORIDE 30 MG: 10 INJECTION, SOLUTION EPIDURAL; INFILTRATION; INTRACAUDAL; PERINEURAL at 10:56

## 2023-07-18 RX ADMIN — SODIUM CHLORIDE, SODIUM LACTATE, POTASSIUM CHLORIDE, CALCIUM CHLORIDE: 600; 310; 30; 20 INJECTION, SOLUTION INTRAVENOUS at 09:16

## 2023-07-18 ASSESSMENT — ENCOUNTER SYMPTOMS: SHORTNESS OF BREATH: 1

## 2023-07-18 NOTE — OP NOTE
Patient: Gerardo Michel : 1970  Med Rec#: [id number] Acc#: [id number]   Primary Care Provider Jm DO Serafin    Date of Procedure:  2023    Endoscopist: Nick Banegas MD, MD    Referring Provider: Jm Betancourt DO,     Operation Performed: Colonoscopy up to the cecum with hot snare resection of proximal rectal polyps    Indications: Colon cancer screening    Anesthesia:  Sedation was administered by anesthesia who monitored the patient during the procedure. I met with Gerardo Michel prior to procedure. We discussed the procedure itself, and I have discussed the risks of endoscopy (including-- but not limited to-- pain, discomfort, bleeding potentially requiring second endoscopic procedure and/or blood transfusion, organ perforation requiring operative repair, damage to organs near the colon, infection, aspiration, cardiopulmonary/allergic reaction), benefits, indications to endoscopy. Additionally, we discussed options other than colonoscopy. The patient expressed understanding. All questions answered. The patient decided to proceed with the procedure. Signed informed consent was placed on the chart. Blood Loss: minimal    Withdrawal time: More than 9 minutes  Bowel Prep: Fair  with small amounts of thick semisolid stool and a moderate amount of thick, opaque liquid scattered in patchy segments throughout the colon obscuring the underlying mucosa. Lesions including polyps may have been missed. Complications: no immediate complications    DESCRIPTION OF PROCEDURE:     A time out was performed. After written informed consent was obtained, the patient was placed in the left lateral position. The perianal area was inspected, and a digital rectal exam was performed. A rectal exam was performed: normal tone, no palpable lesions. At this point, a forward viewing Olympus colonoscope was inserted into the anus and carefully advanced to the cecum.   The cecum was identified by the

## 2023-07-18 NOTE — H&P
Patient Name: Felisa Vela  : 1970  MRN: [id number]  DATE: 23    Allergies: Allergies   Allergen Reactions    Pcn [Penicillins]         ENDOSCOPY  History and Physical    Procedure:    [] Diagnostic Colonoscopy       [x] Screening Colonoscopy  [] EGD      [] ERCP      [] EUS       [] Other    [x] Previous office notes/History and Physical reviewed from the patients chart. Please see EMR for further details of HPI. I have examined the patient's status immediately prior to the procedure and:      Indications/HPI:    []Abdominal Pain   []Cancer- GI/Lung     []Fhx of colon CA/polyps  []History of Polyps  []Barretts            []Melena  []Abnormal Imaging              []Dysphagia              []Persistent Pneumonia   []Anemia                            []Food Impaction        []History of Polyps  [] GI Bleed             []Pulmonary nodule/Mass   []Change in bowel habits []Heartburn/Reflux  []Rectal Bleed (BRBPR)  []Chest Pain - Non Cardiac []Heme (+) Stool []Ulcers  []Constipation  []Hemoptysis  []Varices  []Diarrhea  []Hypoxemia    []Nausea/Vomiting   [x]Screening   []Crohns/Colitis  []Other:     Anesthesia:   [x] MAC [] Moderate Sedation   [] General   [] None     ROS: 12 pt Review of Symptoms was negative unless mentioned above    Medications:   Prior to Admission medications    Medication Sig Start Date End Date Taking?  Authorizing Provider   metoprolol succinate (TOPROL XL) 25 MG extended release tablet Take 1 tablet by mouth nightly 23   Historical Provider, MD   irbesartan (AVAPRO) 75 MG tablet  23   Historical Provider, MD   JARDIANCE 10 MG tablet  23   Historical Provider, MD   fluticasone Mike Dross) 50 MCG/ACT nasal spray  23   Historical Provider, MD   risperiDONE (RISPERDAL) 1 MG tablet Take 1 tablet by mouth nightly 22  Hung Layton MD   traZODone (DESYREL) 50 MG tablet Take 1 tablet by mouth nightly 22  Hung Layton MD       Past

## 2023-07-18 NOTE — ANESTHESIA POSTPROCEDURE EVALUATION
Department of Anesthesiology  Postprocedure Note    Patient: Heather Hauser  MRN: [id number]  [facility] 1970  Date of evaluation: 2023      Procedure Summary     Date: 23 Room / Location: [facility] ENDO [address]    Anesthesia Start: 1670 Anesthesia Stop: 1120    Procedure: COLONOSCOPY POLYPECTOMY SNARE/COLD BIOPSY (Abdomen) Diagnosis:       Screen for colon cancer      (Screen for colon cancer [Z12.11])    Surgeons: José Montaño MD Responsible Provider: Jill Apgar, APRN - CRNA    Anesthesia Type: general, TIVA ASA Status: 3          Anesthesia Type: No value filed.     Dee Phase I:      Dee Phase II:        Anesthesia Post Evaluation    Patient location during evaluation: bedside  Patient participation: complete - patient participated  Level of consciousness: sleepy but conscious  Pain score: 0  Airway patency: patent  Nausea & Vomiting: no nausea and no vomiting  Complications: no  Cardiovascular status: blood pressure returned to baseline  Respiratory status: acceptable, room air and spontaneous ventilation  Hydration status: euvolemic

## 2023-07-18 NOTE — DISCHARGE INSTRUCTIONS
1. Repeat colonoscopy: pending pathology -in 5 years for colon cancer surveillance; sooner if her personal or family history as pertaining to colorectal cancer risk changes requiring an earlier exam or if the patient were to develop lower GI symptoms such as bleeding, abdominal pain, change in bowel habits or stool caliber or if the patient has anemia or unexplained weight loss in the future. 2. Await biopsy results-you will receive a letter with your results within 7-10 days.    - Resume previous meds and diet  - GI clinic f/u PRN   - Keep scheduled f/u appts with other MDs     - NO ASA/NSAIDs x 2 weeks POST-OP ORDERS: ENDOSCOPY & COLONOSCOPY:    1. Rest today. 2. DO NOT eat or drink until wide awake; eat your usual diet today in moderate amount only. 3. DO NOT drive today. 4. Call physician if you have severe pain, vomiting, fever, rectal bleeding or black bowel movements. 5.  If a biopsy was taken or a polyp removed, you should expect to hear results in about 21 days. If you have heard nothing from your physician by then, call the office for results. 6.  Discharge home when patient awake, vitals signs stable and tolerating liquids. 7. Call with questions or concerns [phone]. NSAIDS Non-steroidal Anti-Inflammatory  You have been directed by your physician to avoid any NSAID's; the following medications are a list of those to avoid. If you think that you are taking any NSAID's notify your physician.    Over The Counter  Advil                      Motrin  Nuprin                   Ibuprofen  Midol                     Aleve  Naproxen              Orudis  Aspirin                   Kkee-Preston  Prescriptions and Generics  Cataflam              Relafen  Voltaren               Clinoril  Indocin                 Naproxen  Arthrotec              Lodine  Daypro                 Nalfon  Toradol                Ansaid  Feldene               Meclofenamate  Fenoprofen          Ponstel  Mobic Celebrex  Vioxx

## 2023-09-20 ENCOUNTER — OFFICE VISIT (OUTPATIENT)
Dept: NEUROLOGY | Age: 53
End: 2023-09-20
Payer: MEDICARE

## 2023-09-20 VITALS
HEART RATE: 82 BPM | WEIGHT: 202 LBS | BODY MASS INDEX: 32.47 KG/M2 | HEIGHT: 66 IN | SYSTOLIC BLOOD PRESSURE: 115 MMHG | DIASTOLIC BLOOD PRESSURE: 84 MMHG | OXYGEN SATURATION: 98 %

## 2023-09-20 DIAGNOSIS — Z71.89 ENCOUNTER FOR BIPAP USE COUNSELING: ICD-10-CM

## 2023-09-20 DIAGNOSIS — G47.33 OBSTRUCTIVE SLEEP APNEA: Primary | ICD-10-CM

## 2023-09-20 DIAGNOSIS — Z71.2 ENCOUNTER TO DISCUSS TEST RESULTS: ICD-10-CM

## 2023-09-20 DIAGNOSIS — Z99.89 BIPAP (BIPHASIC POSITIVE AIRWAY PRESSURE) DEPENDENCE: ICD-10-CM

## 2023-09-20 PROCEDURE — 99214 OFFICE O/P EST MOD 30 MIN: CPT | Performed by: PHYSICIAN ASSISTANT

## 2023-09-20 PROCEDURE — 3078F DIAST BP <80 MM HG: CPT | Performed by: PHYSICIAN ASSISTANT

## 2023-09-20 PROCEDURE — 3074F SYST BP LT 130 MM HG: CPT | Performed by: PHYSICIAN ASSISTANT

## 2023-09-20 RX ORDER — EZETIMIBE 10 MG/1
10 TABLET ORAL
COMMUNITY
Start: 2023-09-07

## 2023-09-20 RX ORDER — METRONIDAZOLE 500 MG/1
500 TABLET ORAL 3 TIMES DAILY
COMMUNITY
Start: 2023-08-17

## 2023-09-20 RX ORDER — CETIRIZINE HYDROCHLORIDE 10 MG/1
10 TABLET ORAL
COMMUNITY
Start: 2023-07-18

## 2023-09-20 NOTE — PROGRESS NOTES
REVIEW OF SYSTEMS    Constitutional: []Fever []Sweats []Chills [] Recent Injury   [x] Denies all unless marked  HENT:[]Headache  [] Head Injury  [] Sore Throat  [] Ear Pain  [] Dizziness [] Hearing Loss   [x] Denies all unless marked  Musculoskeletal: [] Arthralgia  [] Myalgias [] Muscle cramps  [] Muscle twitches   [x] Denies all unless marked   Spine:  [] Neck pain  [] Back pain  [] Sciatica  [x] Denies all unless marked  Neurological:[] Visual Disturbance [] Double Vision [] Slurred Speech [] Trouble swallowing  [] Vertigo [] Tingling [] Numbness [] Weakness [] Loss of Balance   [] Loss of Consciousness [] Memory Loss [] Seizures  [x] Denies all unless marked  Psychiatric/Behavioral:[] Depression [] Anxiety  [x] Denies all unless marked  Sleep: []  Insomnia [] Sleep Disturbance [] Snoring [] Restless Legs [] Daytime Sleepiness [x] Sleep Apnea  [] Denies all unless marked
understands that a certain level of compliance allows for continued insurance coverage as well as adequate treatment of underlying apnea. Shane Choi also understands the impact this has upon their overall health status and other medical comorbidities. I explained  the importance of compliance with current therapies. I did explain to her  the potential risks of untreated sleep apnea. I also explained that when she is not using the PAP she does remain untreated with regards to her sleep apnea. she states understanding and will work on further compliance. Plan:     No orders of the defined types were placed in this encounter. 1.   Pt advised of the etiology,  pathophysiology, signs, symptoms, diagnosis, treatment options, and risks of untreated ROQUE. Risks may include, but are not limited to  hypertension, coronary artery disease, atrial fibrillation, CHF, diabetes, stroke, weight gain, impaired cognition, daytime somnolence, and motor vehicle accidents. Advised to abstain from driving or operating heavy machinery when drowsy and the use of respiratory suppressants. Reviewed treatment plan. Counseled on multimodal approach to treatment of sleep apnea to include but not limited to diet, exercise, sleep hygiene, and compliance with pap therapy, if indicated. 2.  Will continue to evaluate for PAP clinical benefit and compliance during a 30 day period within the preceding 90 days PRN. 3.  The following educational material has been included in this visit after visit summary for your review: ROQUE/PAP guidelines/trouble shooting-Discussed with the patient and all questions fully answered. 4.  Use PAP therapy. The patient voices understanding and recognizes the need for adherence to the prescribed therapy  5. If she has to relinquish PAP due to non-compliance, she is to contact the office and can consider re-ordering PAP  6.   Follow up in 3 to reassess symptomatology, PAP tolerance, efficacy and

## 2023-09-21 ENCOUNTER — TELEPHONE (OUTPATIENT)
Dept: NEUROLOGY | Age: 53
End: 2023-09-21

## 2023-09-21 NOTE — TELEPHONE ENCOUNTER
Carolee Wisdom called this afternoon and requested that a message be sent to [facility] to let her know that the patient is having surgery next week and wishes to hold off on using the cpap machine for a few months until she recovers. Thank you.

## 2023-09-22 PROBLEM — Z71.2 ENCOUNTER TO DISCUSS TEST RESULTS: Status: ACTIVE | Noted: 2023-09-22

## 2023-09-22 PROBLEM — Z71.89 ENCOUNTER FOR BIPAP USE COUNSELING: Status: ACTIVE | Noted: 2023-09-22

## 2023-09-22 PROBLEM — Z99.89 BIPAP (BIPHASIC POSITIVE AIRWAY PRESSURE) DEPENDENCE: Status: ACTIVE | Noted: 2023-09-22

## 2023-10-22 PROBLEM — Z71.2 ENCOUNTER TO DISCUSS TEST RESULTS: Status: RESOLVED | Noted: 2023-09-22 | Resolved: 2023-10-22

## 2024-07-09 ENCOUNTER — HOSPITAL ENCOUNTER (OUTPATIENT)
Dept: WOMENS IMAGING | Age: 54
Discharge: HOME | End: 2024-07-09
Payer: COMMERCIAL

## 2024-07-09 DIAGNOSIS — Z12.31 ENCOUNTER FOR SCREENING MAMMOGRAM FOR MALIGNANT NEOPLASM OF BREAST: ICD-10-CM

## 2024-07-09 PROCEDURE — 77063 BREAST TOMOSYNTHESIS BI: CPT

## 2025-05-23 ENCOUNTER — OFFICE VISIT (OUTPATIENT)
Age: 55
End: 2025-05-23

## 2025-05-23 VITALS
BODY MASS INDEX: 28.25 KG/M2 | WEIGHT: 175 LBS | DIASTOLIC BLOOD PRESSURE: 82 MMHG | SYSTOLIC BLOOD PRESSURE: 132 MMHG | TEMPERATURE: 98 F | HEART RATE: 80 BPM | RESPIRATION RATE: 18 BRPM | OXYGEN SATURATION: 99 %

## 2025-05-23 DIAGNOSIS — E11.40 NEUROPATHY DUE TO TYPE 2 DIABETES MELLITUS (HCC): Primary | ICD-10-CM

## 2025-05-23 DIAGNOSIS — Z91.199 MEDICAL NON-COMPLIANCE: ICD-10-CM

## 2025-05-23 DIAGNOSIS — R03.0 ELEVATED BLOOD PRESSURE READING: ICD-10-CM

## 2025-05-23 LAB
CHP ED QC CHECK: NORMAL
GLUCOSE BLD-MCNC: 101 MG/DL

## 2025-05-23 ASSESSMENT — ENCOUNTER SYMPTOMS
ABDOMINAL PAIN: 0
VOMITING: 0
COUGH: 0
SHORTNESS OF BREATH: 0
NAUSEA: 0

## 2025-05-23 NOTE — PROGRESS NOTES
dizziness/lightheadedness, palpitations, or vision changes.       Electronically signed by NICOLE White NP on 5/23/2025 at 11:37 AM    EMR Dragon/transcription disclaimer: Please note that much of this encounter note is electronic transcription/translation of spoken language to printed texts. The electronic translation of spoken language may be erroneous, or at times, nonsensical words or phrases may be inadvertently transcribed. Efforts were made to edit the dictations, but occasionally errors still exist. Thank you.

## 2025-05-23 NOTE — PATIENT INSTRUCTIONS
Please call Dr James's office at your earliest convenience to get re-established.   Follow low carb diet   Stop all drug use   Go to ER for any life threatening / worrisome symptoms     Elevated Blood Pressure:   - Your blood pressure was elevated during your visit today (greater than or equal to 130/80)  - Monitor your blood pressure at home daily. Keep a log.   - Increase your physical activity   - Follow DASH diet (fruits, vegetables, low-fat dairy, whole grains, poultry/fish)  - Limit alcohol consumption   - Monitor sodium intake  - Take all medication(s) as prescribed.   - Follow up with your PCP regarding elevated blood pressure readings today.   - Go to the ER for any chest pain, shortness of breath, dizziness/lightheadedness, palpitations, or vision changes.

## 2025-07-10 ENCOUNTER — HOSPITAL ENCOUNTER (OUTPATIENT)
Dept: WOMENS IMAGING | Age: 55
Discharge: HOME | End: 2025-07-10
Payer: MEDICAID

## 2025-07-10 DIAGNOSIS — Z12.31 BREAST CANCER SCREENING BY MAMMOGRAM: ICD-10-CM

## 2025-07-10 PROCEDURE — 77067 SCR MAMMO BI INCL CAD: CPT

## 2025-07-10 PROCEDURE — 77063 BREAST TOMOSYNTHESIS BI: CPT

## 2025-07-24 ENCOUNTER — HOSPITAL ENCOUNTER (EMERGENCY)
Age: 55
Discharge: HOME | End: 2025-07-24
Payer: MEDICARE

## 2025-07-24 VITALS
HEIGHT: 67 IN | DIASTOLIC BLOOD PRESSURE: 79 MMHG | BODY MASS INDEX: 27.47 KG/M2 | RESPIRATION RATE: 18 BRPM | HEART RATE: 90 BPM | SYSTOLIC BLOOD PRESSURE: 143 MMHG | WEIGHT: 175 LBS | OXYGEN SATURATION: 96 % | TEMPERATURE: 97.8 F

## 2025-07-24 DIAGNOSIS — G62.9 NEUROPATHY: Primary | ICD-10-CM

## 2025-07-24 LAB
GLUCOSE BLD-MCNC: 130 MG/DL (ref 70–99)
PERFORMED ON: ABNORMAL

## 2025-07-24 PROCEDURE — 82962 GLUCOSE BLOOD TEST: CPT

## 2025-07-24 PROCEDURE — 99283 EMERGENCY DEPT VISIT LOW MDM: CPT

## 2025-07-24 RX ORDER — METHYLPREDNISOLONE 4 MG/1
TABLET ORAL
Qty: 1 KIT | Refills: 0 | Status: SHIPPED | OUTPATIENT
Start: 2025-07-24 | End: 2025-07-30

## 2025-07-24 ASSESSMENT — ENCOUNTER SYMPTOMS
RESPIRATORY NEGATIVE: 1
EYES NEGATIVE: 1
GASTROINTESTINAL NEGATIVE: 1

## 2025-07-24 NOTE — DISCHARGE INSTRUCTIONS
As we discussed in the ED, your symptoms are consistent with nerve pain also called neuropathy.  The gabapentin and diclofenac that your primary care started you on earlier this week should be helpful, but they take a few days to make significant improvement.  You may continue these medications, and you should take the Medrol Dosepak that was prescribed today for further symptomatic relief.  You may use diclofenac, which is a topical cream, for further pain control.  You may also take Tylenol for your pain.  Please follow-up with your primary care provider for ongoing management and return if you have any new or worsening symptoms or concerns.  Please follow-up with your primary on the fourth as previously scheduled, and please follow their recommendations in regards to restarting your home medications

## 2025-07-24 NOTE — ED PROVIDER NOTES
[facility] EMERGENCY DEPARTMENT  EMERGENCY DEPARTMENT ENCOUNTER      Pt Name: Christel Kaplan  MRN: [id number]  Birthdate 1970  Date of evaluation: 7/24/2025  Provider: Lesvia Styles PA-C    CHIEF COMPLAINT       Chief Complaint   Patient presents with    Leg Pain     Bilateral. Patient states \"I believe its from me smoking cigarettes\".      HISTORY OF PRESENT ILLNESS   (Location/Symptom, Timing/Onset,Context/Setting, Quality, Duration, Modifying Factors, Severity)  Note limiting factors.   HPI    Christel Kaplan is a 55 y.o. female with a past medical history significant for drug abuse, alcohol abuse, diabetes, neuropathy who presents to the emergency department with a chief complaint of leg pain.  Patient states that for months now, she has been having pain to her bilateral legs.  She describes a painful tingling sensation, that affects both legs, but the left leg is worse, and it radiates from her buttock down to her leg.  She was seen for this at urgent care previously was told that she had neuropathy, and that she needed to follow-up with her primary care.  She followed up with her primary care yesterday and he started her on gabapentin and diclofenac, but her symptoms are not yet improved, so she came back for further evaluation.    She denies fevers, chills, weakness, numbness, tingling.  She believes that her leg pain is because she smokes cigarettes, because she states that at some point someone told her that if she did not stop smoking they were going to have to cut her legs off.     Nursing Notes were reviewed.    Limitations to history: None  Outside historians none    REVIEW OF SYSTEMS    (2-9 systems for level 4, 10 or more for level 5)     Review of Systems   Constitutional: Negative.    HENT: Negative.     Eyes: Negative.    Respiratory: Negative.     Cardiovascular: Negative.    Gastrointestinal: Negative.    Genitourinary: Negative.    Musculoskeletal:  Positive for myalgias.   Skin:

## (undated) DEVICE — TUBE ET 7.5MM NSL ORAL BASIC CUF INTMED MURPHY EYE RADPQ

## (undated) DEVICE — CANNULA NSL AD L7FT DIV O2 CO2 W/ M LUERLOCK TRMPT CONN

## (undated) DEVICE — SOLUTION IV 1000ML LAC RINGERS PH 6.5 INJ USP VIAFLX PLAS

## (undated) DEVICE — CANN SMPL SOFTECH BIFLO ETCO2 A/M 7FT

## (undated) DEVICE — DECANTER VI VENT W/ VLV FOR ASEP TRNSF OF FLD

## (undated) DEVICE — TROCAR: Brand: KII SHIELDED BLADED ACCESS SYSTEM

## (undated) DEVICE — ENDO KIT,LOURDES HOSPITAL: Brand: MEDLINE INDUSTRIES, INC.

## (undated) DEVICE — APPLIER CLP M/L SHFT DIA5MM 15 LIG LIGAMAX 5

## (undated) DEVICE — DRAPE,UTILITY,XL,4/PK,STERILE: Brand: MEDLINE

## (undated) DEVICE — BRUSH ENDOSCP 2 END CHN HEDGEHOG

## (undated) DEVICE — SPONGE ENDOSCP CLN BS INF PREVENTION KOALA

## (undated) DEVICE — SUTURE MCRYL SZ 4-0 L18IN ABSRB UD L19MM PS-2 3/8 CIR PRIM Y496G

## (undated) DEVICE — SYSTEM VENT MED AD NASAL PAP SUPERNO2VA

## (undated) DEVICE — PUMP SUC IRR TBNG L10FT W/ HNDPC ASSEMB STRYKEFLOW 2

## (undated) DEVICE — ADAPTER CLEANING PORPOISE CLEANING

## (undated) DEVICE — GENERAL LAP CDS

## (undated) DEVICE — LARYNGOSCOPE BLDE MAC HNDL M SZ 35 ST CURAPLEX CURAVIEW LED

## (undated) DEVICE — ROYAL SILK SURGICAL GOWN, XXL: Brand: CONVERTORS

## (undated) DEVICE — ADHESIVE SKIN CLSR 0.7ML TOP DERMBND ADV

## (undated) DEVICE — SINGLE PORT MANIFOLD: Brand: NEPTUNE 2

## (undated) DEVICE — GLOVE SURG SZ 7 CRM LTX FREE POLYISOPRENE POLYMER BEAD ANTI

## (undated) DEVICE — SUTURE SZ 0 27IN 5/8 CIR UR-6  TAPER PT VIOLET ABSRB VICRYL J603H

## (undated) DEVICE — BAG SPEC REM 224ML W4XL6IN DIA10MM 1 HND GYN DISP ENDOPCH